# Patient Record
Sex: FEMALE | Race: WHITE | NOT HISPANIC OR LATINO | Employment: FULL TIME | URBAN - METROPOLITAN AREA
[De-identification: names, ages, dates, MRNs, and addresses within clinical notes are randomized per-mention and may not be internally consistent; named-entity substitution may affect disease eponyms.]

---

## 2017-01-02 DIAGNOSIS — E03.9 HYPOTHYROIDISM, UNSPECIFIED TYPE: Primary | ICD-10-CM

## 2017-01-02 RX ORDER — LEVOTHYROXINE SODIUM 125 UG/1
125 TABLET ORAL DAILY
Qty: 90 TABLET | Refills: 2 | Status: SHIPPED | OUTPATIENT
Start: 2017-01-02 | End: 2017-08-23

## 2017-01-02 NOTE — TELEPHONE ENCOUNTER
Office notes from 12/5/16:    4. Hypothyroidism, unspecified type  Stable with her current thyroid levels.    No refills needed today. She will contact the clinic when they are due to be sent to [facility]      Rx refilled per [facility] RN refill protocol.    Julissa Ziegler RN

## 2017-01-16 ENCOUNTER — TRANSFERRED RECORDS (OUTPATIENT)
Dept: HEALTH INFORMATION MANAGEMENT | Facility: CLINIC | Age: 57
End: 2017-01-16

## 2017-02-09 ENCOUNTER — MYC MEDICAL ADVICE (OUTPATIENT)
Dept: FAMILY MEDICINE | Facility: CLINIC | Age: 57
End: 2017-02-09

## 2017-02-24 ENCOUNTER — MYC MEDICAL ADVICE (OUTPATIENT)
Dept: FAMILY MEDICINE | Facility: CLINIC | Age: 57
End: 2017-02-24

## 2017-02-24 DIAGNOSIS — E11.9 TYPE 2 DIABETES MELLITUS WITHOUT COMPLICATION, WITHOUT LONG-TERM CURRENT USE OF INSULIN (H): Primary | ICD-10-CM

## 2017-02-27 ENCOUNTER — MYC MEDICAL ADVICE (OUTPATIENT)
Dept: FAMILY MEDICINE | Facility: CLINIC | Age: 57
End: 2017-02-27

## 2017-02-27 PROBLEM — E11.9 TYPE 2 DIABETES MELLITUS WITHOUT COMPLICATION, WITHOUT LONG-TERM CURRENT USE OF INSULIN (H): Status: ACTIVE | Noted: 2017-02-27

## 2017-04-14 ENCOUNTER — TELEPHONE (OUTPATIENT)
Dept: FAMILY MEDICINE | Facility: CLINIC | Age: 57
End: 2017-04-14

## 2017-04-14 DIAGNOSIS — E11.9 TYPE 2 DIABETES MELLITUS WITHOUT COMPLICATION, WITHOUT LONG-TERM CURRENT USE OF INSULIN (H): ICD-10-CM

## 2017-04-14 RX ORDER — LIRAGLUTIDE 6 MG/ML
1.2 INJECTION SUBCUTANEOUS DAILY
Qty: 6 ML | Refills: 0 | Status: SHIPPED | OUTPATIENT
Start: 2017-04-14 | End: 2017-05-15

## 2017-04-14 NOTE — TELEPHONE ENCOUNTER
liraglutide (VICTOZA) 18 MG/3ML soln 6 mL 0 11/15/2016  No   Sig: Inject 1.2 mg Subcutaneous daily            Last Written Prescription Date: 11/15/16  Last Fill Quantity: 6ml, # refills: 0  Last Office Visit with G, [facility] or [facility] prescribing provider:  12/5/16        BP Readings from Last 3 Encounters:   12/05/16 136/82   05/10/16 132/84   02/03/16 135/88     Lab Results   Component Value Date    MICROL 10 05/10/2016     Lab Results   Component Value Date    UMALCR 15.11 05/10/2016     Creatinine   Date Value Ref Range Status   11/21/2016 0.63 0.52 - 1.04 mg/dL Final   ]  GFR Estimate   Date Value Ref Range Status   11/21/2016 >90  Non  GFR Calc   >60 mL/min/1.7m2 Final   05/10/2016 >90  Non  GFR Calc   >60 mL/min/1.7m2 Final   02/03/2016 86 >60 mL/min/1.7m2 Final     Comment:     Non  GFR Calc     GFR Estimate If Black   Date Value Ref Range Status   11/21/2016 >90   GFR Calc   >60 mL/min/1.7m2 Final   05/10/2016 >90   GFR Calc   >60 mL/min/1.7m2 Final   02/03/2016 >90   GFR Calc   >60 mL/min/1.7m2 Final     Lab Results   Component Value Date    CHOL 229 12/30/2016     Lab Results   Component Value Date    HDL 38 12/30/2016     Lab Results   Component Value Date    LDL  12/30/2016     Cannot estimate LDL when triglyceride exceeds 400 mg/dL     12/30/2016     Lab Results   Component Value Date    TRIG 453 12/30/2016     Lab Results   Component Value Date    CHOLHDLRATIO 4.5 02/27/2015     Lab Results   Component Value Date    AST 21 07/21/2015     Lab Results   Component Value Date    ALT 36 07/21/2015     Lab Results   Component Value Date    A1C 6.8 11/21/2016    A1C 6.6 05/10/2016    A1C 8.2 02/03/2016    A1C 7.9 09/10/2015    A1C 7.7 02/27/2015     Potassium   Date Value Ref Range Status   11/21/2016 4.4 3.4 - 5.3 mmol/L Final     Refilled per [facility] RN Refill Protocol.  Nanette Wise RN

## 2017-05-09 ENCOUNTER — MYC MEDICAL ADVICE (OUTPATIENT)
Dept: FAMILY MEDICINE | Facility: CLINIC | Age: 57
End: 2017-05-09

## 2017-05-09 DIAGNOSIS — E11.9 TYPE 2 DIABETES MELLITUS WITHOUT COMPLICATION, WITHOUT LONG-TERM CURRENT USE OF INSULIN (H): Primary | ICD-10-CM

## 2017-05-09 NOTE — TELEPHONE ENCOUNTER
Need previsit labs-see orders and send back to pool to notify patient./Betty Gonzalez,       Lab apt 5/12/17.

## 2017-05-11 DIAGNOSIS — E11.9 TYPE 2 DIABETES MELLITUS WITHOUT COMPLICATION, WITHOUT LONG-TERM CURRENT USE OF INSULIN (H): ICD-10-CM

## 2017-05-11 DIAGNOSIS — I10 BENIGN ESSENTIAL HYPERTENSION: ICD-10-CM

## 2017-05-11 LAB
ALBUMIN SERPL-MCNC: 3.9 G/DL (ref 3.4–5)
ANION GAP SERPL CALCULATED.3IONS-SCNC: 8 MMOL/L (ref 3–14)
BUN SERPL-MCNC: 13 MG/DL (ref 7–30)
CALCIUM SERPL-MCNC: 9.4 MG/DL (ref 8.5–10.1)
CHLORIDE SERPL-SCNC: 99 MMOL/L (ref 94–109)
CO2 SERPL-SCNC: 30 MMOL/L (ref 20–32)
CREAT SERPL-MCNC: 0.66 MG/DL (ref 0.52–1.04)
CREAT UR-MCNC: 12 MG/DL
GFR SERPL CREATININE-BSD FRML MDRD: ABNORMAL ML/MIN/1.7M2
GLUCOSE SERPL-MCNC: 175 MG/DL (ref 70–99)
HBA1C MFR BLD: 7 % (ref 4.3–6)
MICROALBUMIN UR-MCNC: 6 MG/L
MICROALBUMIN/CREAT UR: 45.85 MG/G CR (ref 0–25)
PHOSPHATE SERPL-MCNC: 3.4 MG/DL (ref 2.5–4.5)
POTASSIUM SERPL-SCNC: 4.1 MMOL/L (ref 3.4–5.3)
SODIUM SERPL-SCNC: 137 MMOL/L (ref 133–144)
VIT B12 SERPL-MCNC: 922 PG/ML (ref 193–986)

## 2017-05-11 PROCEDURE — 83036 HEMOGLOBIN GLYCOSYLATED A1C: CPT | Performed by: PHYSICIAN ASSISTANT

## 2017-05-11 PROCEDURE — 82607 VITAMIN B-12: CPT | Performed by: PHYSICIAN ASSISTANT

## 2017-05-11 PROCEDURE — 82043 UR ALBUMIN QUANTITATIVE: CPT | Performed by: PHYSICIAN ASSISTANT

## 2017-05-11 PROCEDURE — 36415 COLL VENOUS BLD VENIPUNCTURE: CPT | Performed by: PHYSICIAN ASSISTANT

## 2017-05-11 PROCEDURE — 80069 RENAL FUNCTION PANEL: CPT | Performed by: PHYSICIAN ASSISTANT

## 2017-05-11 RX ORDER — LISINOPRIL AND HYDROCHLOROTHIAZIDE 20; 25 MG/1; MG/1
TABLET ORAL
Qty: 90 TABLET | Refills: 0 | Status: SHIPPED | OUTPATIENT
Start: 2017-05-11 | End: 2017-08-23

## 2017-05-11 RX ORDER — METFORMIN HCL 500 MG
TABLET, EXTENDED RELEASE 24 HR ORAL
Qty: 360 TABLET | Refills: 0 | Status: SHIPPED | OUTPATIENT
Start: 2017-05-11 | End: 2017-08-23

## 2017-05-12 ENCOUNTER — MYC MEDICAL ADVICE (OUTPATIENT)
Dept: FAMILY MEDICINE | Facility: CLINIC | Age: 57
End: 2017-05-12

## 2017-05-12 DIAGNOSIS — E11.9 TYPE 2 DIABETES MELLITUS WITHOUT COMPLICATION, WITHOUT LONG-TERM CURRENT USE OF INSULIN (H): Primary | ICD-10-CM

## 2017-05-12 DIAGNOSIS — E11.9 TYPE 2 DIABETES MELLITUS WITHOUT COMPLICATION, WITHOUT LONG-TERM CURRENT USE OF INSULIN (H): ICD-10-CM

## 2017-05-15 ENCOUNTER — MYC MEDICAL ADVICE (OUTPATIENT)
Dept: FAMILY MEDICINE | Facility: CLINIC | Age: 57
End: 2017-05-15

## 2017-05-15 DIAGNOSIS — E11.9 TYPE 2 DIABETES MELLITUS WITHOUT COMPLICATION, WITHOUT LONG-TERM CURRENT USE OF INSULIN (H): Primary | ICD-10-CM

## 2017-05-15 RX ORDER — LIRAGLUTIDE 6 MG/ML
1.2 INJECTION SUBCUTANEOUS DAILY
Qty: 18 ML | Refills: 1 | Status: SHIPPED | OUTPATIENT
Start: 2017-05-15 | End: 2017-08-23

## 2017-05-15 NOTE — TELEPHONE ENCOUNTER
Lab Results   Component Value Date    A1C 7.0 05/11/2017    A1C 6.8 11/21/2016    A1C 6.6 05/10/2016    A1C 8.2 02/03/2016    A1C 7.9 09/10/2015     Per protocol, will route encounter to be cosigned by provider for Verbal Orders.  Rosa Rodriguez RN

## 2017-07-10 ENCOUNTER — MYC MEDICAL ADVICE (OUTPATIENT)
Dept: FAMILY MEDICINE | Facility: CLINIC | Age: 57
End: 2017-07-10

## 2017-07-10 DIAGNOSIS — E11.29 TYPE 2 DIABETES MELLITUS WITH MICROALBUMINURIA, WITHOUT LONG-TERM CURRENT USE OF INSULIN (H): Primary | ICD-10-CM

## 2017-07-10 DIAGNOSIS — R80.9 TYPE 2 DIABETES MELLITUS WITH MICROALBUMINURIA, WITHOUT LONG-TERM CURRENT USE OF INSULIN (H): Primary | ICD-10-CM

## 2017-07-10 NOTE — TELEPHONE ENCOUNTER
Albumin Urine mg/g Cr 45.85 (H) 0 - 25 mg/g Cr Final 05/11/2017  9:41 AM 59       To provider to advise    Mildred Gibbs BSN, RN, CPN

## 2017-07-12 ENCOUNTER — TELEPHONE (OUTPATIENT)
Dept: EDUCATION SERVICES | Facility: CLINIC | Age: 57
End: 2017-07-12

## 2017-07-12 NOTE — TELEPHONE ENCOUNTER
Diabetes Education Scheduling Outreach #1:    Call to patient to schedule. Patient declined to schedule. She stated she did not have her schedule on her. Offered to call her back next week, pt declined and stated she will call us when she has her schedule.        [facility] Fierro  [facility]  Diabetes and Nutrition Scheduling

## 2017-07-12 NOTE — TELEPHONE ENCOUNTER
Just FYI we called Amarilis to schedule and she declined as she did not have her schedule and said she will call on her own.  Onofre Valles RN/LOLYE  [place] Diabetes Educator

## 2017-08-16 DIAGNOSIS — I10 BENIGN ESSENTIAL HYPERTENSION: ICD-10-CM

## 2017-08-16 DIAGNOSIS — E11.9 TYPE 2 DIABETES MELLITUS WITHOUT COMPLICATION, WITHOUT LONG-TERM CURRENT USE OF INSULIN (H): ICD-10-CM

## 2017-08-16 NOTE — TELEPHONE ENCOUNTER
Per results notes 5/11/17 patient was to follow up diabetic educator.  Per telephone encounter 7/12/17, patient declined scheduling an appointment.  Please advise on refill.  Rosa Rodriguez RN

## 2017-08-17 RX ORDER — METFORMIN HCL 500 MG
TABLET, EXTENDED RELEASE 24 HR ORAL
OUTPATIENT
Start: 2017-08-17

## 2017-08-17 RX ORDER — LISINOPRIL AND HYDROCHLOROTHIAZIDE 20; 25 MG/1; MG/1
TABLET ORAL
OUTPATIENT
Start: 2017-08-17

## 2017-08-17 NOTE — TELEPHONE ENCOUNTER
Patient is due for an appointment. She has not been seen since 12/2016.   , please contact Amarilis to schedule an appointment.   She will need an appointment for further refills.   Kristen Kehr PA-C

## 2017-08-23 ENCOUNTER — OFFICE VISIT (OUTPATIENT)
Dept: FAMILY MEDICINE | Facility: CLINIC | Age: 57
End: 2017-08-23
Payer: COMMERCIAL

## 2017-08-23 ENCOUNTER — TELEPHONE (OUTPATIENT)
Dept: EDUCATION SERVICES | Facility: CLINIC | Age: 57
End: 2017-08-23

## 2017-08-23 VITALS
HEART RATE: 87 BPM | OXYGEN SATURATION: 98 % | WEIGHT: 181 LBS | TEMPERATURE: 97.7 F | DIASTOLIC BLOOD PRESSURE: 80 MMHG | BODY MASS INDEX: 29.09 KG/M2 | HEIGHT: 66 IN | SYSTOLIC BLOOD PRESSURE: 132 MMHG

## 2017-08-23 DIAGNOSIS — R80.9 TYPE 2 DIABETES MELLITUS WITH MICROALBUMINURIA, WITHOUT LONG-TERM CURRENT USE OF INSULIN (H): Primary | ICD-10-CM

## 2017-08-23 DIAGNOSIS — E03.9 HYPOTHYROIDISM, UNSPECIFIED TYPE: ICD-10-CM

## 2017-08-23 DIAGNOSIS — Z12.11 SCREEN FOR COLON CANCER: ICD-10-CM

## 2017-08-23 DIAGNOSIS — I25.10 CORONARY ARTERY DISEASE INVOLVING NATIVE CORONARY ARTERY OF NATIVE HEART WITHOUT ANGINA PECTORIS: ICD-10-CM

## 2017-08-23 DIAGNOSIS — I10 BENIGN ESSENTIAL HYPERTENSION: ICD-10-CM

## 2017-08-23 DIAGNOSIS — E11.29 TYPE 2 DIABETES MELLITUS WITH MICROALBUMINURIA, WITHOUT LONG-TERM CURRENT USE OF INSULIN (H): Primary | ICD-10-CM

## 2017-08-23 LAB
ALBUMIN SERPL-MCNC: 4.1 G/DL (ref 3.4–5)
ANION GAP SERPL CALCULATED.3IONS-SCNC: 14 MMOL/L (ref 3–14)
BUN SERPL-MCNC: 13 MG/DL (ref 7–30)
CALCIUM SERPL-MCNC: 9.8 MG/DL (ref 8.5–10.1)
CHLORIDE SERPL-SCNC: 100 MMOL/L (ref 94–109)
CHOLEST SERPL-MCNC: 268 MG/DL
CO2 SERPL-SCNC: 24 MMOL/L (ref 20–32)
CREAT SERPL-MCNC: 0.63 MG/DL (ref 0.52–1.04)
CREAT UR-MCNC: 15 MG/DL
GFR SERPL CREATININE-BSD FRML MDRD: >90 ML/MIN/1.7M2
GLUCOSE SERPL-MCNC: 120 MG/DL (ref 70–99)
HBA1C MFR BLD: 7.3 % (ref 4.3–6)
HDLC SERPL-MCNC: 41 MG/DL
LDLC SERPL CALC-MCNC: 156 MG/DL
MICROALBUMIN UR-MCNC: <5 MG/L
MICROALBUMIN/CREAT UR: NORMAL MG/G CR (ref 0–25)
NONHDLC SERPL-MCNC: 227 MG/DL
PHOSPHATE SERPL-MCNC: 4.2 MG/DL (ref 2.5–4.5)
POTASSIUM SERPL-SCNC: 4.1 MMOL/L (ref 3.4–5.3)
SODIUM SERPL-SCNC: 138 MMOL/L (ref 133–144)
T4 FREE SERPL-MCNC: 1.51 NG/DL (ref 0.76–1.46)
TRIGL SERPL-MCNC: 355 MG/DL
TSH SERPL DL<=0.005 MIU/L-ACNC: 0.1 MU/L (ref 0.4–4)

## 2017-08-23 PROCEDURE — 36415 COLL VENOUS BLD VENIPUNCTURE: CPT | Performed by: PHYSICIAN ASSISTANT

## 2017-08-23 PROCEDURE — 84439 ASSAY OF FREE THYROXINE: CPT | Performed by: PHYSICIAN ASSISTANT

## 2017-08-23 PROCEDURE — 84443 ASSAY THYROID STIM HORMONE: CPT | Performed by: PHYSICIAN ASSISTANT

## 2017-08-23 PROCEDURE — 80061 LIPID PANEL: CPT | Performed by: PHYSICIAN ASSISTANT

## 2017-08-23 PROCEDURE — 83036 HEMOGLOBIN GLYCOSYLATED A1C: CPT | Performed by: PHYSICIAN ASSISTANT

## 2017-08-23 PROCEDURE — 82043 UR ALBUMIN QUANTITATIVE: CPT | Performed by: PHYSICIAN ASSISTANT

## 2017-08-23 PROCEDURE — 80069 RENAL FUNCTION PANEL: CPT | Performed by: PHYSICIAN ASSISTANT

## 2017-08-23 PROCEDURE — 99214 OFFICE O/P EST MOD 30 MIN: CPT | Performed by: PHYSICIAN ASSISTANT

## 2017-08-23 RX ORDER — LISINOPRIL AND HYDROCHLOROTHIAZIDE 20; 25 MG/1; MG/1
TABLET ORAL
Qty: 90 TABLET | Refills: 1 | Status: SHIPPED | OUTPATIENT
Start: 2017-08-23 | End: 2018-02-15

## 2017-08-23 RX ORDER — LEVOTHYROXINE SODIUM 125 UG/1
125 TABLET ORAL DAILY
Qty: 90 TABLET | Refills: 3 | Status: SHIPPED | OUTPATIENT
Start: 2017-08-23 | End: 2017-08-25 | Stop reason: DRUGHIGH

## 2017-08-23 RX ORDER — LIRAGLUTIDE 6 MG/ML
1.2 INJECTION SUBCUTANEOUS DAILY
Qty: 6 ML | Refills: 1 | Status: SHIPPED | OUTPATIENT
Start: 2017-08-23 | End: 2018-01-31

## 2017-08-23 RX ORDER — METFORMIN HCL 500 MG
TABLET, EXTENDED RELEASE 24 HR ORAL
Qty: 360 TABLET | Refills: 1 | Status: SHIPPED | OUTPATIENT
Start: 2017-08-23 | End: 2018-02-15

## 2017-08-23 NOTE — MR AVS SNAPSHOT
After Visit Summary   8/23/2017    Amarilis Vazquez    MRN: [id number]           Patient Information     Date Of Birth          1960        Visit Information        Provider Department      8/23/2017 9:50 AM Kehr, Kristen M, PA-C [facility]        Today's Diagnoses     Benign essential hypertension        Type 2 diabetes mellitus without complication, without long-term current use of insulin (H)        Hypothyroidism, unspecified type        Screen for colon cancer        Need for prophylactic vaccination and inoculation against influenza          Care Instructions    Appointment in 6 months with lab tests before.         Diabetes Education appointment                Follow-ups after your visit        Additional Services     DIABETES EDUCATOR REFERRAL       DIABETES SELF MANAGEMENT TRAINING (DSMT)      Your provider has referred you to Diabetes Education: [facility]: Diabetes Education - All [facility] [phone]   [email or web address]    Type of training and number of hours: Previous Diagnosis: Follow-up DSMT - 2 hours.    Medicare covers: 10 hours of initial DSMT in 12 month period from the time of first visit, plus 2 hours of follow-up DSMT annually, and additional hours as requested for insulin training.    Diabetes Type: Type 2 - On Oral Medication             Diabetes Co-Morbidities: none               A1C Goal:  <8.0       A1C is: Lab Results       Component                Value               Date                       A1C                      7.0                 05/11/2017              If an urgent visit is needed or A1C is above 12, Care Team to call the Diabetes Education Team at [phone] or send an In Basket message to the Diabetes Education Pool (P DIAB ED-PATIENT CARE).    Diabetes Education Topics: Comprehensive Knowledge Assessment and Instruction    Special Educational Needs Requiring Individual DSMT: None        MEDICAL NUTRITION THERAPY (MNT) for Diabetes    Medical Nutrition Therapy with a Registered Dietitian can be provided in coordination with Diabetes Self-Management Training to assist in achieving optimal diabetes management.    MNT Type and Hours: Do not initiate MNT at this time.                       Medicare will cover: 3 hours initial MNT in 12 month period after first visit, plus 2 hours of follow-up MNT annually    Please be aware that coverage of these services is subject to the terms and limitations of your health insurance plan.  Call member services at your health plan to determine Diabetes Self-Management Training benefits and ask which blood glucose monitor brands are covered by your plan.      Please bring the following with you to your appointment:    (1)  List of current medications   (2)  List of Blood Glucose Monitor brands that are covered by your insurance plan  (3)  Blood Glucose Monitor and log book  (4)   Food records for the 3 days prior to your visit    The Certified Diabetes Educator may make diabetes medication adjustments per the CDE Protocol and Collaborative Practice Agreement.                  Who to contact     If you have questions or need follow up information about today's clinic visit or your schedule please contact [facility] directly at [phone].  Normal or non-critical lab and imaging results will be communicated to you by MyChart, letter or phone within 4 business days after the clinic has received the results. If you do not hear from us within 7 days, please contact the clinic through [employer] or phone. If you have a critical or abnormal lab result, we will notify you by phone as soon as possible.  Submit refill requests through [employer] or call your pharmacy and they will forward the refill request to us. Please allow 3 business days for your refill to be completed.          Additional Information About Your Visit        MyChart Information     [employer]  "gives you secure access to your electronic health record. If you see a primary care provider, you can also send messages to your care team and make appointments. If you have questions, please call your primary care clinic.  If you do not have a primary care provider, please call [phone] and they will assist you.        Care EveryWhere ID     This is your Care EveryWhere ID. This could be used by other organizations to access your [facility] medical records  [id number]        Your Vitals Were     Pulse Temperature Height Pulse Oximetry BMI (Body Mass Index)       87 97.7  F (36.5  C) (Oral) 5' 6\" (1.676 m) 98% 29.21 kg/m2        Blood Pressure from Last 3 Encounters:   08/23/17 132/80   12/05/16 136/82   05/10/16 132/84    Weight from Last 3 Encounters:   08/23/17 181 lb (82.1 kg)   12/05/16 180 lb (81.6 kg)   05/10/16 172 lb (78 kg)              We Performed the Following     Albumin Random Urine Quantitative     DIABETES EDUCATOR REFERRAL     Hemoglobin A1c     Lipid panel reflex to direct LDL     Renal panel     TSH with free T4 reflex          Today's Medication Changes          These changes are accurate as of: 8/23/17 10:30 AM.  If you have any questions, ask your nurse or doctor.               These medicines have changed or have updated prescriptions.        Dose/Directions    lisinopril-hydrochlorothiazide 20-25 MG per tablet   Commonly known as:  PRINZIDE/ZESTORETIC   This may have changed:  See the new instructions.   Used for:  Benign essential hypertension   Changed by:  Kehr, Kristen M, PA-C        TAKE 1 TABLET EVERY DAY   Quantity:  90 tablet   Refills:  1       metFORMIN 500 MG 24 hr tablet   Commonly known as:  GLUCOPHAGE-XR   This may have changed:  See the new instructions.   Used for:  Type 2 diabetes mellitus without complication, without long-term current use of insulin (H)   Changed by:  Kehr, Kristen M, PA-C        TAKE 2 TABLETS TWICE A DAY   Quantity:  360 tablet   Refills:  1          "   Where to get your medicines      These medications were sent to [facility] [id number] [facility] - [place], [place] - [address], [facility] [address]     Phone:  [phone]     levothyroxine 125 MCG tablet    lisinopril-hydrochlorothiazide 20-25 MG per tablet    metFORMIN 500 MG 24 hr tablet         These medications were sent to [facility] - [place], [place] - [address] [place] [address] [facility] [facility] [address]     Phone:  [phone]     liraglutide 18 MG/3ML soln                Primary Care Provider Office Phone # Fax #    Kristen M Kehr, PA-C [phone] [address] [facility] [address]        Equal Access to Services     BELLA SUNG : Hadii taiwo aden hadasho Soomaali, waaxda luqadaha, qaybta kaalmada adeegyada, hansa andino . So [facility] [phone].    ATENCIÓN: Si habla español, tiene a stroud disposición servicios gratuitos de asistencia lingüística. [facility] [phone].    We comply with applicable federal civil rights laws and [place] laws. We do not discriminate on the basis of race, color, national origin, age, disability sex, sexual orientation or gender identity.            Thank you!     Thank you for choosing [facility]  for your care. Our goal is always to provide you with excellent care. Hearing back from our patients is one way we can continue to improve our services. Please take a few minutes to complete the written survey that you may receive in the mail after your visit with us. Thank you!             Your Updated Medication List - Protect others around you: Learn how to safely use, store and throw away your medicines at www.disposemymeds.org.          This list is accurate as of: 8/23/17 10:30 AM.  Always use your most recent med list.                   Brand Name Dispense Instructions for use Diagnosis    Alpha Lipoic Acid 200 MG Caps            aspirin 81 MG tablet      Take 1 tablet by mouth daily.        B COMPLEX PO      Super B complex        CO Q 10 PO      Take  by mouth.        COMPLETE MULTIVITAMIN/MINERAL PO      Take  by mouth.        Fish Oil Oil      1,400 mg        flaxseed oil 1000 MG Caps      Take  by mouth.        insulin pen needle 32G X 4 MM     100 each    Use 1 pen needles daily or as directed.    Type 2 diabetes mellitus without complication, without long-term current use of insulin (H)       levothyroxine 125 MCG tablet    SYNTHROID/LEVOTHROID    90 tablet    Take 1 tablet (125 mcg) by mouth daily    Hypothyroidism, unspecified type       liraglutide 18 MG/3ML soln    VICTOZA    6 mL    Inject 1.2 mg Subcutaneous daily    Type 2 diabetes mellitus without complication, without long-term current use of insulin (H)       lisinopril-hydrochlorothiazide 20-25 MG per tablet    PRINZIDE/ZESTORETIC    90 tablet    TAKE 1 TABLET EVERY DAY    Benign essential hypertension       Magnesium 400 MG Tabs           metFORMIN 500 MG 24 hr tablet    GLUCOPHAGE-XR    360 tablet    TAKE 2 TABLETS TWICE A DAY    Type 2 diabetes mellitus without complication, without long-term current use of insulin (H)

## 2017-08-23 NOTE — NURSING NOTE
"Chief Complaint   Patient presents with     Diabetes       Initial /86  Pulse 87  Temp 97.7  F (36.5  C) (Oral)  Ht 5' 6\" (1.676 m)  Wt 181 lb (82.1 kg)  SpO2 98%  BMI 29.21 kg/m2 Estimated body mass index is 29.21 kg/(m^2) as calculated from the following:    Height as of this encounter: 5' 6\" (1.676 m).    Weight as of this encounter: 181 lb (82.1 kg).  Medication Reconciliation: complete    TIA Payne MA    "

## 2017-08-23 NOTE — PROGRESS NOTES
SUBJECTIVE:   Amarilis Vazquez is a 57 year old female who presents to clinic today for the following health issues:  Amarilis has been taking her medications. She is also working hard to make lifestyle changes with diet and exercise. She is walking at least 40 minutes daily on the treadmill. She was unable to connect with Diabetes Education, but was unable to make an appointment with her busy schedule. She stopped trying to get in. She feels good and has no concerns today.   She continues to have some numbness in the right great toe.     Diabetes Follow-up    Patient is checking blood sugars: once daily.  Results are as follows:       am - 130's - 140's this morning    Diabetic concerns: None and other - feet     Symptoms of hypoglycemia (low blood sugar): none     Paresthesias (numbness or burning in feet) or sores: Yes    Date of last diabetic eye exam:       Amount of exercise or physical activity:     Problems taking medications regularly: No    Medication side effects: none  Diet:         Problem list and histories reviewed & adjusted, as indicated.  Additional history: as documented    Patient Active Problem List   Diagnosis     HTN, goal below 140/90     Hypothyroidism     CAD (coronary artery disease)     Obesity     Coronary artery disease involving native coronary artery of native heart without angina pectoris     Mixed hyperlipidemia     Advanced directives, counseling/discussion     Type 2 diabetes mellitus with microalbuminuria, without long-term current use of insulin (H)     Past Surgical History:   Procedure Laterality Date     D & C  1979       Social History   Substance Use Topics     Smoking status: Former Smoker     Packs/day: 1.00     Years: 20.00     Types: Cigarettes     Start date: 1/1/1976     Quit date: 1/1/2005     Smokeless tobacco: Never Used      Comment: quit 8-9 years ago     Alcohol use Yes      Comment: seldom     Family History   Problem Relation Age of Onset     CEREBROVASCULAR  DISEASE Mother      CANCER Mother      lung     C.A.D. Mother      MI age 50?     C.A.D. Paternal Grandfather 50     MI     C.A.D. Maternal Grandmother      DIABETES Maternal Grandmother      Thyroid Disease Maternal Grandmother      C.A.D. Maternal Grandfather      Thyroid Disease Son      Breast Cancer No family hx of      Gynecology No family hx of      No ovarian, endometrial cancer     OSTEOPOROSIS No family hx of      Ovarian Cancer No family hx of      Cancer - colorectal No family hx of          Current Outpatient Prescriptions   Medication Sig Dispense Refill     lisinopril-hydrochlorothiazide (PRINZIDE/ZESTORETIC) 20-25 MG per tablet TAKE 1 TABLET EVERY DAY 90 tablet 1     metFORMIN (GLUCOPHAGE-XR) 500 MG 24 hr tablet TAKE 2 TABLETS TWICE A  tablet 1     levothyroxine (SYNTHROID/LEVOTHROID) 125 MCG tablet Take 1 tablet (125 mcg) by mouth daily 90 tablet 3     liraglutide (VICTOZA) 18 MG/3ML soln Inject 1.2 mg Subcutaneous daily 6 mL 1     insulin pen needle 32G X 4 MM Use 1 pen needles daily or as directed. 100 each 5     B Complex Vitamins (B COMPLEX PO) Super B complex       Magnesium 400 MG TABS        Fish Oil OIL 1,400 mg       Alpha Lipoic Acid 200 MG CAPS        Coenzyme Q10 (CO Q 10 PO) Take  by mouth.       Flaxseed, Linseed, (FLAXSEED OIL) 1000 MG CAPS Take  by mouth.       Multiple Vitamins-Minerals (COMPLETE MULTIVITAMIN/MINERAL PO) Take  by mouth.       aspirin 81 MG tablet Take 1 tablet by mouth daily.       [DISCONTINUED] liraglutide (VICTOZA) 18 MG/3ML soln Inject 1.2 mg Subcutaneous daily 18 mL 1     [DISCONTINUED] metFORMIN (GLUCOPHAGE-XR) 500 MG 24 hr tablet TAKE 2 TABLETS TWICE A  tablet 0     [DISCONTINUED] lisinopril-hydrochlorothiazide (PRINZIDE/ZESTORETIC) 20-25 MG per tablet TAKE 1 TABLET EVERY DAY 90 tablet 0     [DISCONTINUED] levothyroxine (SYNTHROID/LEVOTHROID) 125 MCG tablet Take 1 tablet (125 mcg) by mouth daily 90 tablet 2     No Known Allergies      Reviewed  "and updated as needed this visit by clinical staffAllergies       Reviewed and updated as needed this visit by Provider         ROS:  Constitutional, HEENT, cardiovascular, pulmonary, GI, , musculoskeletal, neuro, skin, endocrine and psych systems are negative, except as otherwise noted.      OBJECTIVE:   /80  Pulse 87  Temp 97.7  F (36.5  C) (Oral)  Ht 5' 6\" (1.676 m)  Wt 181 lb (82.1 kg)  SpO2 98%  BMI 29.21 kg/m2  Body mass index is 29.21 kg/(m^2).  GENERAL: healthy, alert and no distress  RESP: lungs clear to auscultation - no rales, rhonchi or wheezes  CV: regular rate and rhythm, normal S1 S2, no S3 or S4, no murmur, click or rub, no peripheral edema and peripheral pulses strong  MS: no gross musculoskeletal defects noted, no edema  SKIN: no suspicious lesions or rashes  PSYCH: mentation appears normal, affect normal/bright  Diabetic foot exam: normal DP and PT pulses, no trophic changes or ulcerative lesions and reduced sensation with monofilament in the lateral aspect of the right great toe. Otherwise monofilament exam is normal.     Diagnostic Test Results:  pending    ASSESSMENT/PLAN:       1. Type 2 diabetes mellitus with microalbuminuria, without long-term current use of insulin (H)  A1C will be done today.   If above 7, then I recommend that she increase the victoza to 1.8 mg dose.   She refuses to make any changes with her medications at this time since she is making changes with her diet.   I would like her to meet with Diabetes Education. She has contact information for this.   She is taking an ASA  She declines a statin and is well aware of the risks.   - metFORMIN (GLUCOPHAGE-XR) 500 MG 24 hr tablet; TAKE 2 TABLETS TWICE A DAY  Dispense: 360 tablet; Refill: 1  - liraglutide (VICTOZA) 18 MG/3ML soln; Inject 1.2 mg Subcutaneous daily  Dispense: 6 mL; Refill: 1  - DIABETES EDUCATOR REFERRAL    2. Benign essential hypertension  Stable, recheck is normal.   - " lisinopril-hydrochlorothiazide (PRINZIDE/ZESTORETIC) 20-25 MG per tablet; TAKE 1 TABLET EVERY DAY  Dispense: 90 tablet; Refill: 1    3. Hypothyroidism, unspecified type  TSH is done today.   - levothyroxine (SYNTHROID/LEVOTHROID) 125 MCG tablet; Take 1 tablet (125 mcg) by mouth daily  Dispense: 90 tablet; Refill: 3    4. Screen for colon cancer  - Fecal colorectal cancer screen FIT - Future (S+30); Future    5. Coronary artery disease  She still declines adding a statin, reviewed risks once again.     She was scheduled for an appointment in 6 months with fasting lab tests.     Kristen M. Kehr, PA-C  [facility]

## 2017-08-25 ENCOUNTER — TELEPHONE (OUTPATIENT)
Dept: FAMILY MEDICINE | Facility: CLINIC | Age: 57
End: 2017-08-25

## 2017-08-25 DIAGNOSIS — E03.9 HYPOTHYROIDISM, UNSPECIFIED TYPE: Primary | ICD-10-CM

## 2017-08-25 RX ORDER — LEVOTHYROXINE SODIUM 112 UG/1
112 TABLET ORAL DAILY
Qty: 90 TABLET | Refills: 1 | Status: SHIPPED | OUTPATIENT
Start: 2017-08-25 | End: 2018-01-10

## 2017-08-25 NOTE — TELEPHONE ENCOUNTER
Patient notified of results and voiced understanding.     No further questions or concerns at this time.  Emy Gant RN   [facility]

## 2017-08-25 NOTE — TELEPHONE ENCOUNTER
Please contact Amarilis and let her know that her tests are back and there needs to be adjustments with her medication.     She is going to have to decrease the dose of the thyroid medication. She will go to 112 mcg daily.   I will send a new prescription to the [facility] pharmacy    Her thyroid function can be checked again with a lab only appointment in 2 months.     Her cholesterol is high, she refuses a statin, all results will be sent via [employer].     Kristen Kehr PA-C            Results for orders placed or performed in visit on 08/23/17   Albumin Random Urine Quantitative   Result Value Ref Range    Creatinine Urine 15 mg/dL    Albumin Urine mg/L <5 mg/L    Albumin Urine mg/g Cr Unable to calculate due to low value 0 - 25 mg/g Cr   Hemoglobin A1c   Result Value Ref Range    Hemoglobin A1C 7.3 (H) 4.3 - 6.0 %   Lipid panel reflex to direct LDL   Result Value Ref Range    Cholesterol 268 (H) <200 mg/dL    Triglycerides 355 (H) <150 mg/dL    HDL Cholesterol 41 (L) >49 mg/dL    LDL Cholesterol Calculated 156 (H) <100 mg/dL    Non HDL Cholesterol 227 (H) <130 mg/dL   Renal panel   Result Value Ref Range    Sodium 138 133 - 144 mmol/L    Potassium 4.1 3.4 - 5.3 mmol/L    Chloride 100 94 - 109 mmol/L    Carbon Dioxide 24 20 - 32 mmol/L    Anion Gap 14 3 - 14 mmol/L    Glucose 120 (H) 70 - 99 mg/dL    Urea Nitrogen 13 7 - 30 mg/dL    Creatinine 0.63 0.52 - 1.04 mg/dL    GFR Estimate >90 >60 mL/min/1.7m2    GFR Estimate If Black >90 >60 mL/min/1.7m2    Calcium 9.8 8.5 - 10.1 mg/dL    Phosphorus 4.2 2.5 - 4.5 mg/dL    Albumin 4.1 3.4 - 5.0 g/dL   TSH with free T4 reflex   Result Value Ref Range    TSH 0.10 (L) 0.40 - 4.00 mU/L   T4 free   Result Value Ref Range    T4 Free 1.51 (H) 0.76 - 1.46 ng/dL

## 2017-09-24 DIAGNOSIS — I10 BENIGN ESSENTIAL HYPERTENSION: ICD-10-CM

## 2017-09-26 RX ORDER — LISINOPRIL AND HYDROCHLOROTHIAZIDE 20; 25 MG/1; MG/1
TABLET ORAL
Qty: 90 TABLET | Refills: 0 | OUTPATIENT
Start: 2017-09-26

## 2017-10-08 PROCEDURE — 82274 ASSAY TEST FOR BLOOD FECAL: CPT | Performed by: PHYSICIAN ASSISTANT

## 2017-10-12 LAB — HEMOCCULT STL QL IA: NEGATIVE

## 2017-10-30 ENCOUNTER — MYC MEDICAL ADVICE (OUTPATIENT)
Dept: FAMILY MEDICINE | Facility: CLINIC | Age: 57
End: 2017-10-30

## 2017-10-30 DIAGNOSIS — E78.2 MIXED HYPERLIPIDEMIA: ICD-10-CM

## 2017-10-30 DIAGNOSIS — E11.9 TYPE 2 DIABETES MELLITUS WITHOUT COMPLICATION, WITHOUT LONG-TERM CURRENT USE OF INSULIN (H): ICD-10-CM

## 2017-10-30 RX ORDER — ROSUVASTATIN CALCIUM 5 MG/1
5 TABLET, COATED ORAL DAILY
Qty: 90 TABLET | Refills: 2 | Status: SHIPPED | OUTPATIENT
Start: 2017-10-30 | End: 2018-07-26

## 2017-10-30 NOTE — TELEPHONE ENCOUNTER
Result notes 8/23/17  Amarilis,   Your thyroid medication needs to be adjusted, so a new dose was sent to [facility].   A nurse will also be contacting you with the details and follow up.   Your cholesterol is still too high. I would like you to start a statin.   I will wait to hear from you about the statin, since we have discussed this already and know that you are wanting to avoid it.   Kristen Kehr PA-C       Per protocol, will route encounter to be cosigned by provider for Verbal Orders.  Rosa Rodriguez RN

## 2017-11-18 ENCOUNTER — MYC MEDICAL ADVICE (OUTPATIENT)
Dept: FAMILY MEDICINE | Facility: CLINIC | Age: 57
End: 2017-11-18

## 2017-11-18 NOTE — LETTER
[facility]  [address] [facility] [address] [phone]      To Whom it May Concern:        Amarilis Vazquez is treated for Diabetes Type 2. She would benefit from regular exercise and weight loss. Please allow her to use benefits to pay for a gym membership.         Sincerely,       Kristen Kehr PA-C

## 2017-12-27 ENCOUNTER — MYC MEDICAL ADVICE (OUTPATIENT)
Dept: FAMILY MEDICINE | Facility: CLINIC | Age: 57
End: 2017-12-27

## 2017-12-27 NOTE — LETTER
[facility]  [address] [address] [phone]     11/20/2017     To Whom it May Concern:           Amarilis Vazquez is treated for Diabetes Type 2. She would benefit from regular exercise and weight loss. Please allow her to use benefits to pay for a gym membership.            Sincerely,         Kristen Kehr PA-C

## 2017-12-27 NOTE — TELEPHONE ENCOUNTER
New letter started with postdate from 11/20/17.     Routing to provider to review and sign.     Emy Gant RN

## 2017-12-29 NOTE — TELEPHONE ENCOUNTER
Spoke to patient who wanted letter emailed to her. Emailed letter from [id number] to [email or web address]    Orly Kirby,

## 2018-01-08 ENCOUNTER — TRANSFERRED RECORDS (OUTPATIENT)
Dept: HEALTH INFORMATION MANAGEMENT | Facility: CLINIC | Age: 58
End: 2018-01-08

## 2018-01-09 DIAGNOSIS — E11.29 TYPE 2 DIABETES MELLITUS WITH MICROALBUMINURIA, WITHOUT LONG-TERM CURRENT USE OF INSULIN (H): Primary | ICD-10-CM

## 2018-01-09 DIAGNOSIS — R80.9 TYPE 2 DIABETES MELLITUS WITH MICROALBUMINURIA, WITHOUT LONG-TERM CURRENT USE OF INSULIN (H): Primary | ICD-10-CM

## 2018-01-09 DIAGNOSIS — E03.9 HYPOTHYROIDISM, UNSPECIFIED TYPE: ICD-10-CM

## 2018-01-09 LAB
T4 FREE SERPL-MCNC: 1.43 NG/DL (ref 0.76–1.46)
TSH SERPL DL<=0.005 MIU/L-ACNC: 0.12 MU/L (ref 0.4–4)

## 2018-01-09 PROCEDURE — 84439 ASSAY OF FREE THYROXINE: CPT | Performed by: PHYSICIAN ASSISTANT

## 2018-01-09 PROCEDURE — 84443 ASSAY THYROID STIM HORMONE: CPT | Performed by: PHYSICIAN ASSISTANT

## 2018-01-09 PROCEDURE — 36415 COLL VENOUS BLD VENIPUNCTURE: CPT | Performed by: PHYSICIAN ASSISTANT

## 2018-01-10 ENCOUNTER — TELEPHONE (OUTPATIENT)
Dept: FAMILY MEDICINE | Facility: CLINIC | Age: 58
End: 2018-01-10

## 2018-01-10 DIAGNOSIS — E03.9 HYPOTHYROIDISM, UNSPECIFIED TYPE: Primary | ICD-10-CM

## 2018-01-10 RX ORDER — LEVOTHYROXINE SODIUM 100 UG/1
112 TABLET ORAL DAILY
Qty: 90 TABLET | Refills: 1 | Status: SHIPPED | OUTPATIENT
Start: 2018-01-10 | End: 2018-06-21

## 2018-01-10 NOTE — TELEPHONE ENCOUNTER
Patient informed of provider note as below and of prescription sent to pharmacy.  Patient verbalized understanding.  .Mildred SALN, RN, CPN

## 2018-01-10 NOTE — TELEPHONE ENCOUNTER
Please contact Amarilis and let her know that her thyroid test still shows that her medication needs to be adjusted.   I am going to send a new prescription for 100 mcg to the pharmacy. Recheck when she needs to be seen for diabetes in Feb or March it can wait for that appointment and lab tests.  Kristen Kehr PA-C

## 2018-01-31 DIAGNOSIS — R80.9 TYPE 2 DIABETES MELLITUS WITH MICROALBUMINURIA, WITHOUT LONG-TERM CURRENT USE OF INSULIN (H): ICD-10-CM

## 2018-01-31 DIAGNOSIS — E11.29 TYPE 2 DIABETES MELLITUS WITH MICROALBUMINURIA, WITHOUT LONG-TERM CURRENT USE OF INSULIN (H): ICD-10-CM

## 2018-02-01 RX ORDER — LIRAGLUTIDE 6 MG/ML
1.2 INJECTION SUBCUTANEOUS DAILY
Qty: 6 ML | Refills: 0 | Status: SHIPPED | OUTPATIENT
Start: 2018-02-01 | End: 2018-02-26

## 2018-02-15 DIAGNOSIS — E11.29 TYPE 2 DIABETES MELLITUS WITH MICROALBUMINURIA, WITHOUT LONG-TERM CURRENT USE OF INSULIN (H): ICD-10-CM

## 2018-02-15 DIAGNOSIS — R80.9 TYPE 2 DIABETES MELLITUS WITH MICROALBUMINURIA, WITHOUT LONG-TERM CURRENT USE OF INSULIN (H): ICD-10-CM

## 2018-02-15 DIAGNOSIS — I10 BENIGN ESSENTIAL HYPERTENSION: ICD-10-CM

## 2018-02-15 RX ORDER — METFORMIN HCL 500 MG
TABLET, EXTENDED RELEASE 24 HR ORAL
Qty: 360 TABLET | Refills: 0 | Status: SHIPPED | OUTPATIENT
Start: 2018-02-15 | End: 2018-02-26

## 2018-02-15 RX ORDER — LISINOPRIL AND HYDROCHLOROTHIAZIDE 20; 25 MG/1; MG/1
TABLET ORAL
Qty: 90 TABLET | Refills: 0 | Status: SHIPPED | OUTPATIENT
Start: 2018-02-15 | End: 2018-02-26

## 2018-02-26 ENCOUNTER — OFFICE VISIT (OUTPATIENT)
Dept: FAMILY MEDICINE | Facility: CLINIC | Age: 58
End: 2018-02-26
Payer: COMMERCIAL

## 2018-02-26 VITALS
RESPIRATION RATE: 16 BRPM | BODY MASS INDEX: 29.09 KG/M2 | DIASTOLIC BLOOD PRESSURE: 80 MMHG | WEIGHT: 181 LBS | HEART RATE: 89 BPM | HEIGHT: 66 IN | TEMPERATURE: 97.2 F | OXYGEN SATURATION: 100 % | SYSTOLIC BLOOD PRESSURE: 132 MMHG

## 2018-02-26 DIAGNOSIS — R80.9 TYPE 2 DIABETES MELLITUS WITH MICROALBUMINURIA, WITHOUT LONG-TERM CURRENT USE OF INSULIN (H): Primary | ICD-10-CM

## 2018-02-26 DIAGNOSIS — Z12.31 VISIT FOR SCREENING MAMMOGRAM: ICD-10-CM

## 2018-02-26 DIAGNOSIS — I10 BENIGN ESSENTIAL HYPERTENSION: ICD-10-CM

## 2018-02-26 DIAGNOSIS — E03.9 HYPOTHYROIDISM, UNSPECIFIED TYPE: ICD-10-CM

## 2018-02-26 DIAGNOSIS — I25.10 CORONARY ARTERY DISEASE INVOLVING NATIVE CORONARY ARTERY OF NATIVE HEART WITHOUT ANGINA PECTORIS: ICD-10-CM

## 2018-02-26 DIAGNOSIS — E53.8 VITAMIN B12 DEFICIENCY (NON ANEMIC): ICD-10-CM

## 2018-02-26 DIAGNOSIS — E11.29 TYPE 2 DIABETES MELLITUS WITH MICROALBUMINURIA, WITHOUT LONG-TERM CURRENT USE OF INSULIN (H): Primary | ICD-10-CM

## 2018-02-26 LAB
ALBUMIN SERPL-MCNC: 4.1 G/DL (ref 3.4–5)
ALP SERPL-CCNC: 69 U/L (ref 40–150)
ALT SERPL W P-5'-P-CCNC: 46 U/L (ref 0–50)
ANION GAP SERPL CALCULATED.3IONS-SCNC: 7 MMOL/L (ref 3–14)
AST SERPL W P-5'-P-CCNC: 23 U/L (ref 0–45)
BILIRUB SERPL-MCNC: 1 MG/DL (ref 0.2–1.3)
BUN SERPL-MCNC: 14 MG/DL (ref 7–30)
CALCIUM SERPL-MCNC: 9.2 MG/DL (ref 8.5–10.1)
CHLORIDE SERPL-SCNC: 98 MMOL/L (ref 94–109)
CHOLEST SERPL-MCNC: 173 MG/DL
CO2 SERPL-SCNC: 29 MMOL/L (ref 20–32)
CREAT SERPL-MCNC: 0.61 MG/DL (ref 0.52–1.04)
GFR SERPL CREATININE-BSD FRML MDRD: >90 ML/MIN/1.7M2
GLUCOSE SERPL-MCNC: 138 MG/DL (ref 70–99)
HBA1C MFR BLD: 7.9 % (ref 4.3–6)
HDLC SERPL-MCNC: 41 MG/DL
LDLC SERPL CALC-MCNC: 59 MG/DL
NONHDLC SERPL-MCNC: 132 MG/DL
POTASSIUM SERPL-SCNC: 4.3 MMOL/L (ref 3.4–5.3)
PROT SERPL-MCNC: 7.9 G/DL (ref 6.8–8.8)
SODIUM SERPL-SCNC: 134 MMOL/L (ref 133–144)
TRIGL SERPL-MCNC: 363 MG/DL
TSH SERPL DL<=0.005 MIU/L-ACNC: 0.49 MU/L (ref 0.4–4)
VIT B12 SERPL-MCNC: 1951 PG/ML (ref 193–986)

## 2018-02-26 PROCEDURE — 99207 C FOOT EXAM  NO CHARGE: CPT | Performed by: PHYSICIAN ASSISTANT

## 2018-02-26 PROCEDURE — 83036 HEMOGLOBIN GLYCOSYLATED A1C: CPT | Performed by: PHYSICIAN ASSISTANT

## 2018-02-26 PROCEDURE — 80061 LIPID PANEL: CPT | Performed by: PHYSICIAN ASSISTANT

## 2018-02-26 PROCEDURE — 80053 COMPREHEN METABOLIC PANEL: CPT | Performed by: PHYSICIAN ASSISTANT

## 2018-02-26 PROCEDURE — 36415 COLL VENOUS BLD VENIPUNCTURE: CPT | Performed by: PHYSICIAN ASSISTANT

## 2018-02-26 PROCEDURE — 99214 OFFICE O/P EST MOD 30 MIN: CPT | Performed by: PHYSICIAN ASSISTANT

## 2018-02-26 PROCEDURE — 84443 ASSAY THYROID STIM HORMONE: CPT | Performed by: PHYSICIAN ASSISTANT

## 2018-02-26 PROCEDURE — 82607 VITAMIN B-12: CPT | Performed by: PHYSICIAN ASSISTANT

## 2018-02-26 RX ORDER — MAGNESIUM 200 MG
1000 TABLET ORAL DAILY
COMMUNITY

## 2018-02-26 ASSESSMENT — PAIN SCALES - GENERAL: PAINLEVEL: NO PAIN (0)

## 2018-02-26 NOTE — NURSING NOTE
"Chief Complaint   Patient presents with     Diabetes       Initial /80  Pulse 89  Temp 97.2  F (36.2  C) (Oral)  Resp 16  Ht 5' 5.5\" (1.664 m)  Wt 181 lb (82.1 kg)  SpO2 100%  BMI 29.66 kg/m2 Estimated body mass index is 29.66 kg/(m^2) as calculated from the following:    Height as of this encounter: 5' 5.5\" (1.664 m).    Weight as of this encounter: 181 lb (82.1 kg).  Medication Reconciliation: complete    TIA Payne MA    "

## 2018-02-26 NOTE — PATIENT INSTRUCTIONS
Appointment in 6 months for diabetes    Appointment for routine exam to update Pap screening and health maintenance within the next month

## 2018-02-26 NOTE — PROGRESS NOTES
SUBJECTIVE:   Amarilis Vazquez is a 58 year old female who presents to clinic today for the following health issues:    Jyoti is here today for her 6 month follow up for diabetes. She has been taking her medications, but admits she has not been following a particular diet plan. She has a hard time in the winter with exercise and trying to get back in a routine. Her sugars at home are elevated.     Diabetes Follow-up    Patient is checking blood sugars: once daily.  Results are as follows:         am - not good    Diabetic concerns: other -possible hot flashes     Symptoms of hypoglycemia (low blood sugar): none     Paresthesias (numbness or burning in feet) or sores: Yes-numbness     Date of last diabetic eye exam:     BP Readings from Last 2 Encounters:   08/23/17 132/80   12/05/16 136/82     Hemoglobin A1C (%)   Date Value   08/23/2017 7.3 (H)   05/11/2017 7.0 (H)     LDL Cholesterol Calculated (mg/dL)   Date Value   08/23/2017 156 (H)   12/30/2016     Cannot estimate LDL when triglyceride exceeds 400 mg/dL     LDL Cholesterol Direct (mg/dL)   Date Value   12/30/2016 130 (H)       Amount of exercise or physical activity:     Problems taking medications regularly: No    Medication side effects: none    Diet:         PROBLEMS TO ADD ON...  Hyperlipidemia Follow-Up      Rate your low fat/cholesterol diet?: not monitoring fat    Taking statin?  Yes, no muscle aches from statin    Other lipid medications/supplements?:  none    Hypertension Follow-up      Outpatient blood pressures are not being checked.    Low Salt Diet: no added salt    Vascular Disease Follow-up:  Coronary Artery Disease (CAD)      Chest pain or pressure, left side neck or arm pain: No    Shortness of breath/increased sweats/nausea with exertion: No    Pain in calves walking 1-2 blocks: No    Worsened or new symptoms since last visit: No    Nitroglycerin use: no    Daily aspirin use: Yes    Hypothyroidism Follow-up      Since last visit, patient  describes the following symptoms: Weight stable, no hair loss, no skin changes, no constipation, no loose stools    Medication was adjusted last appointment, she is due for testing      Problem list and histories reviewed & adjusted, as indicated.  Additional history: as documented    Patient Active Problem List   Diagnosis     HTN, goal below 140/90     Hypothyroidism     CAD (coronary artery disease)     Obesity     Coronary artery disease involving native coronary artery of native heart without angina pectoris     Mixed hyperlipidemia     Advanced directives, counseling/discussion     Type 2 diabetes mellitus with microalbuminuria, without long-term current use of insulin (H)     Past Surgical History:   Procedure Laterality Date     D & C  1979       Social History   Substance Use Topics     Smoking status: Former Smoker     Packs/day: 1.00     Years: 20.00     Types: Cigarettes     Start date: 1/1/1976     Quit date: 1/1/2005     Smokeless tobacco: Never Used      Comment: quit 8-9 years ago     Alcohol use Yes      Comment: seldom     Family History   Problem Relation Age of Onset     CEREBROVASCULAR DISEASE Mother      CANCER Mother      lung     C.A.D. Mother      MI age 50?     C.A.D. Paternal Grandfather 50     MI     C.A.D. Maternal Grandmother      DIABETES Maternal Grandmother      Thyroid Disease Maternal Grandmother      C.A.D. Maternal Grandfather      Thyroid Disease Son      Breast Cancer No family hx of      Gynecology No family hx of      No ovarian, endometrial cancer     OSTEOPOROSIS No family hx of      Ovarian Cancer No family hx of      Cancer - colorectal No family hx of          Current Outpatient Prescriptions   Medication Sig Dispense Refill     cyanocobalamin 1000 MCG SUBL sublingual tablet Place 1,000 mcg under the tongue daily       lisinopril-hydrochlorothiazide (PRINZIDE/ZESTORETIC) 20-25 MG per tablet TAKE 1 TABLET BY MOUTH EVERY DAY 90 tablet 0     metFORMIN (GLUCOPHAGE-XR) 500 MG  "24 hr tablet TAKE 2 TABLETS BY MOUTH TWICE A  tablet 0     liraglutide (VICTOZA) 18 MG/3ML soln Inject 1.2 mg Subcutaneous daily 6 mL 0     levothyroxine (SYNTHROID/LEVOTHROID) 100 MCG tablet Take 1 tablet (100 mcg) by mouth daily 90 tablet 1     rosuvastatin (CRESTOR) 5 MG tablet Take 1 tablet (5 mg) by mouth daily 90 tablet 2     insulin pen needle 32G X 4 MM Use 1 pen needles daily or as directed. 100 each 5     B Complex Vitamins (B COMPLEX PO) Super B complex       Magnesium 400 MG TABS        Fish Oil OIL 1,400 mg       Alpha Lipoic Acid 200 MG CAPS        Coenzyme Q10 (CO Q 10 PO) Take  by mouth.       Flaxseed, Linseed, (FLAXSEED OIL) 1000 MG CAPS Take  by mouth.       Multiple Vitamins-Minerals (COMPLETE MULTIVITAMIN/MINERAL PO) Take  by mouth.       aspirin 81 MG tablet Take 1 tablet by mouth daily.       No Known Allergies  BP Readings from Last 3 Encounters:   02/26/18 132/80   08/23/17 132/80   12/05/16 136/82    Wt Readings from Last 3 Encounters:   02/26/18 181 lb (82.1 kg)   08/23/17 181 lb (82.1 kg)   12/05/16 180 lb (81.6 kg)                    Reviewed and updated as needed this visit by clinical staff       Reviewed and updated as needed this visit by Provider         ROS:  Constitutional, HEENT, cardiovascular, pulmonary, GI, , musculoskeletal, neuro, skin, endocrine and psych systems are negative, except as otherwise noted.    OBJECTIVE:     /80  Pulse 89  Temp 97.2  F (36.2  C) (Oral)  Resp 16  Ht 5' 5.5\" (1.664 m)  Wt 181 lb (82.1 kg)  SpO2 100%  BMI 29.66 kg/m2  Body mass index is 29.66 kg/(m^2).  GENERAL: healthy, alert and no distress  EYES: Eyes grossly normal to inspection, PERRL and conjunctivae and sclerae normal  HENT: ear canals and TM's normal, nose and mouth without ulcers or lesions  NECK: no adenopathy, no asymmetry, masses, or scars and thyroid normal to palpation  RESP: lungs clear to auscultation - no rales, rhonchi or wheezes  CV: regular rate and " rhythm, normal S1 S2, no S3 or S4, no murmur, click or rub, no peripheral edema and peripheral pulses strong  MS: no gross musculoskeletal defects noted, no edema  SKIN: no suspicious lesions or rashes  NEURO: Normal strength and tone, mentation intact and speech normal  PSYCH: mentation appears normal, affect normal/bright  Diabetic foot exam: normal DP and PT pulses, no trophic changes or ulcerative lesions, normal sensory exam and normal monofilament exam    Diagnostic Test Results:  A1C: 7.9    ASSESSMENT/PLAN:         1. Type 2 diabetes mellitus with microalbuminuria, without long-term current use of insulin (H)  A1C is available, all other tests are still pending.   I will send via [employer] ([place] when all results are back.   She will continue on her same dose of medication.   Encouraged to work with the Diabetes Educator and also on making lifestyle changes. She admits she has not been doing well with this. If A1C is above 8.0 at her next appointment, she will need to discuss other options for medications or adding insulin with Endocrine.   - Lipid panel reflex to direct LDL Fasting  - Comprehensive metabolic panel  - Hemoglobin A1c  - FOOT EXAM  - liraglutide (VICTOZA) 18 MG/3ML soln; Inject 1.2 mg Subcutaneous daily  Dispense: 18 mL; Refill: 1  - metFORMIN (GLUCOPHAGE-XR) 500 MG 24 hr tablet; TAKE 2 TABLETS BY MOUTH TWICE A DAY  Dispense: 360 tablet; Refill: 1  - lisinopril-hydrochlorothiazide (PRINZIDE/ZESTORETIC) 20-25 MG per tablet; Take 1 tablet by mouth daily  Dispense: 90 tablet; Refill: 1    2. Coronary artery disease involving native coronary artery of native heart without angina pectoris  Stable, see above.     3. Hypothyroidism, unspecified type  Stable, waiting for TSH  - TSH with free T4 reflex    4. Vitamin B12 deficiency (non anemic)  - Vitamin B12    5. Benign essential hypertension  - lisinopril-hydrochlorothiazide (PRINZIDE/ZESTORETIC) 20-25 MG per tablet; Take 1 tablet by mouth daily  Dispense: 90  tablet; Refill: 1    6. Visit for screening mammogram  - *MA Screening Digital Bilateral; Future    Appointment in 6 months, sooner if concerns. She is due for a routine exam and will schedule that appointment in the near future.     Kristen M. Kehr, PA-C  [facility]

## 2018-02-26 NOTE — MR AVS SNAPSHOT
After Visit Summary   2/26/2018    Amarilis Vazquez    MRN: [id number]           Patient Information     Date Of Birth          1960        Visit Information        Provider Department      2/26/2018 9:00 AM Kehr, Kristen M, PA-C [facility]        Today's Diagnoses     Type 2 diabetes mellitus with microalbuminuria, without long-term current use of insulin (H)    -  1    Visit for screening mammogram        Hypothyroidism, unspecified type        Vitamin B12 deficiency (non anemic)          Care Instructions    Appointment in 6 months for diabetes    Appointment for routine exam to update Pap screening and health maintenance within the next month           Follow-ups after your visit        Future tests that were ordered for you today     Open Future Orders        Priority Expected Expires Ordered    *MA Screening Digital Bilateral Routine  2/26/2019 2/26/2018            Who to contact     If you have questions or need follow up information about today's clinic visit or your schedule please contact [facility] directly at [phone].  Normal or non-critical lab and imaging results will be communicated to you by [employer], letter or phone within 4 business days after the clinic has received the results. If you do not hear from us within 7 days, please contact the clinic through [employer] or phone. If you have a critical or abnormal lab result, we will notify you by phone as soon as possible.  Submit refill requests through [employer] or call your pharmacy and they will forward the refill request to us. Please allow 3 business days for your refill to be completed.          Additional Information About Your Visit        MyChart Information     [employer] gives you secure access to your electronic health record. If you see a primary care provider, you can also send messages to your care team and make appointments. If you have questions, please call your primary care clinic.  If you do  "not have a primary care provider, please call [phone] and they will assist you.        Care EveryWhere ID     This is your Care EveryWhere ID. This could be used by other organizations to access your [place] medical records  [id number]        Your Vitals Were     Pulse Temperature Height Pulse Oximetry BMI (Body Mass Index)       89 97.2  F (36.2  C) (Oral) 5' 5.5\" (1.664 m) 100% 29.66 kg/m2        Blood Pressure from Last 3 Encounters:   02/26/18 132/80   08/23/17 132/80   12/05/16 136/82    Weight from Last 3 Encounters:   02/26/18 181 lb (82.1 kg)   08/23/17 181 lb (82.1 kg)   12/05/16 180 lb (81.6 kg)              We Performed the Following     Comprehensive metabolic panel     Hemoglobin A1c     Lipid panel reflex to direct LDL Fasting     TSH with free T4 reflex     Vitamin B12        Primary Care Provider Office Phone # Fax #    Kristen M Kehr, PA-C [phone] [address] [facility] [address]        Equal Access to Services     [facility] : Hadii aad ku hadasho Sojoe, waaxda luqadaha, qaybta kaalmada madi, hansa andino . So [facility] [phone].    ATENCIÓN: Si habla español, tiene a stroud disposición servicios gratuitos de asistencia lingüística. [facility] [phone].    We comply with applicable federal civil rights laws and [place] laws. We do not discriminate on the basis of race, color, national origin, age, disability, sex, sexual orientation, or gender identity.            Thank you!     Thank you for choosing [facility]  for your care. Our goal is always to provide you with excellent care. Hearing back from our patients is one way we can continue to improve our services. Please take a few minutes to complete the written survey that you may receive in the mail after your visit with us. Thank you!             Your Updated Medication List - Protect others around you: Learn how to safely use, store and throw away your " medicines at www.disposemymeds.org.          This list is accurate as of 2/26/18  9:37 AM.  Always use your most recent med list.                   Brand Name Dispense Instructions for use Diagnosis    Alpha Lipoic Acid 200 MG Caps           aspirin 81 MG tablet      Take 1 tablet by mouth daily.        B COMPLEX PO      Super B complex        CO Q 10 PO      Take  by mouth.        COMPLETE MULTIVITAMIN/MINERAL PO      Take  by mouth.        cyanocobalamin 1000 MCG Subl sublingual tablet      Place 1,000 mcg under the tongue daily        Fish Oil Oil      1,400 mg        flaxseed oil 1000 MG Caps      Take  by mouth.        insulin pen needle 32G X 4 MM     100 each    Use 1 pen needles daily or as directed.    Type 2 diabetes mellitus without complication, without long-term current use of insulin (H)       levothyroxine 100 MCG tablet    SYNTHROID/LEVOTHROID    90 tablet    Take 1 tablet (100 mcg) by mouth daily    Hypothyroidism, unspecified type       liraglutide 18 MG/3ML soln    VICTOZA    6 mL    Inject 1.2 mg Subcutaneous daily    Type 2 diabetes mellitus with microalbuminuria, without long-term current use of insulin (H)       lisinopril-hydrochlorothiazide 20-25 MG per tablet    PRINZIDE/ZESTORETIC    90 tablet    TAKE 1 TABLET BY MOUTH EVERY DAY    Benign essential hypertension, Type 2 diabetes mellitus with microalbuminuria, without long-term current use of insulin (H)       Magnesium 400 MG Tabs           metFORMIN 500 MG 24 hr tablet    GLUCOPHAGE-XR    360 tablet    TAKE 2 TABLETS BY MOUTH TWICE A DAY    Type 2 diabetes mellitus with microalbuminuria, without long-term current use of insulin (H)       rosuvastatin 5 MG tablet    CRESTOR    90 tablet    Take 1 tablet (5 mg) by mouth daily    Mixed hyperlipidemia, Type 2 diabetes mellitus without complication, without long-term current use of insulin (H)

## 2018-02-27 ENCOUNTER — MYC MEDICAL ADVICE (OUTPATIENT)
Dept: FAMILY MEDICINE | Facility: CLINIC | Age: 58
End: 2018-02-27

## 2018-03-02 ENCOUNTER — TELEPHONE (OUTPATIENT)
Dept: FAMILY MEDICINE | Facility: CLINIC | Age: 58
End: 2018-03-02

## 2018-03-02 RX ORDER — LIRAGLUTIDE 6 MG/ML
1.2 INJECTION SUBCUTANEOUS DAILY
Qty: 18 ML | Refills: 1 | Status: SHIPPED | OUTPATIENT
Start: 2018-03-02 | End: 2018-09-05

## 2018-03-02 RX ORDER — LISINOPRIL AND HYDROCHLOROTHIAZIDE 20; 25 MG/1; MG/1
1 TABLET ORAL DAILY
Qty: 90 TABLET | Refills: 1 | Status: SHIPPED | OUTPATIENT
Start: 2018-03-02 | End: 2018-09-05

## 2018-03-02 RX ORDER — METFORMIN HCL 500 MG
TABLET, EXTENDED RELEASE 24 HR ORAL
Qty: 360 TABLET | Refills: 1 | Status: SHIPPED | OUTPATIENT
Start: 2018-03-02 | End: 2018-09-05

## 2018-03-02 NOTE — TELEPHONE ENCOUNTER
Patient has diabetic check 8/20, wondering if will need to come in fasting. Advised patient she will need to be fasting for appointment. Did offer lab appointment 1 week prior to appointment so Kristen Kehr,PA-C has labs at visit and patient refused.  Patient wanting earlier time since needs to be fasting.  Appointment 8/21 at 8am with Kristen Kehr,PA-C Melissa Krizan BSN, RN, CPN

## 2018-03-02 NOTE — TELEPHONE ENCOUNTER
Patient is wondering if patient need a fasting appointment for visit.   Please call to advise  Thank you

## 2018-03-03 ENCOUNTER — MYC MEDICAL ADVICE (OUTPATIENT)
Dept: FAMILY MEDICINE | Facility: CLINIC | Age: 58
End: 2018-03-03

## 2018-03-03 DIAGNOSIS — G62.9 NEUROPATHY: Primary | ICD-10-CM

## 2018-03-03 DIAGNOSIS — R80.9 TYPE 2 DIABETES MELLITUS WITH MICROALBUMINURIA, WITHOUT LONG-TERM CURRENT USE OF INSULIN (H): ICD-10-CM

## 2018-03-03 DIAGNOSIS — E11.29 TYPE 2 DIABETES MELLITUS WITH MICROALBUMINURIA, WITHOUT LONG-TERM CURRENT USE OF INSULIN (H): ICD-10-CM

## 2018-03-04 ENCOUNTER — HEALTH MAINTENANCE LETTER (OUTPATIENT)
Age: 58
End: 2018-03-04

## 2018-03-05 RX ORDER — LIRAGLUTIDE 6 MG/ML
INJECTION SUBCUTANEOUS
Refills: 0 | OUTPATIENT
Start: 2018-03-05

## 2018-03-20 ENCOUNTER — MYC MEDICAL ADVICE (OUTPATIENT)
Dept: FAMILY MEDICINE | Facility: CLINIC | Age: 58
End: 2018-03-20

## 2018-03-20 DIAGNOSIS — H91.90 HEARING LOSS, UNSPECIFIED HEARING LOSS TYPE, UNSPECIFIED LATERALITY: Primary | ICD-10-CM

## 2018-03-20 NOTE — TELEPHONE ENCOUNTER
Have started audiology referral for pcp to complete.  Patient message states needing audiology appointment; feels she may need hearing aids.  11/28/16 audiology appointment note states was recommended that patient return for a hearing aid consultation. Send back to team when completed to contact patient with referral information.  Natalia Mortensen RN

## 2018-03-22 NOTE — TELEPHONE ENCOUNTER
1. 3/20/18 Kristen Kehr C-PA placed an audiology referral - need to notify patient.   2. Diabetes education referral is pending for signature.  3. Does ortho fit patient for shoe insert for neuropathy? If so, ortho referral is pending. Patient does not have neuropathy on her problem list.     To provider to advise. Thank you, Kathrine Stoll, DORONN RN

## 2018-03-30 ENCOUNTER — ALLIED HEALTH/NURSE VISIT (OUTPATIENT)
Dept: EDUCATION SERVICES | Facility: CLINIC | Age: 58
End: 2018-03-30
Payer: COMMERCIAL

## 2018-03-30 VITALS — BODY MASS INDEX: 30.65 KG/M2 | WEIGHT: 187 LBS

## 2018-03-30 DIAGNOSIS — R80.9 TYPE 2 DIABETES MELLITUS WITH MICROALBUMINURIA, WITHOUT LONG-TERM CURRENT USE OF INSULIN (H): Primary | ICD-10-CM

## 2018-03-30 DIAGNOSIS — E11.29 TYPE 2 DIABETES MELLITUS WITH MICROALBUMINURIA, WITHOUT LONG-TERM CURRENT USE OF INSULIN (H): Primary | ICD-10-CM

## 2018-03-30 PROCEDURE — G0108 DIAB MANAGE TRN  PER INDIV: HCPCS

## 2018-03-30 NOTE — MR AVS SNAPSHOT
"              After Visit Summary   3/30/2018    Amarilis Vazquez    MRN: [id number]           Patient Information     Date Of Birth          1960        Visit Information        Provider Department      3/30/2018 9:00 AM [facility] [place]        Care Instructions    Work cooler:    1 bag almonds + 1 container of peanuts (dry or honey roasted) + 2 cups cashews+ 1 container pecans + 1 cup walnuts + 1-2 cup dried fruits (apricots, banana chips, or other dried fruit) + 1/2 cup dark chocolate chips (semi sweet)    Bring portion of [employer] vegetables in cooler    Check out pre portioned cheese flavors    Insurance company: call number on the back  Diabetes Self management Training and/or Medical Nutrition Therapy  \"how many hours for each\"              Follow-ups after your visit        Your next 10 appointments already scheduled     Apr 02, 2018 10:30 AM CDT   New Visit with Timothy Fair   [facility] ([address] [id number] [phone]            Apr 14, 2018 10:15 AM CDT   (Arrive by 10:00 AM)   MA SCREENING DIGITAL BILATERAL with ANDMA1   [facility]    [address] [facility] [facility] [address] [phone]           Do not use any powder, lotion or deodorant under your arms or on your breast. If you do, we will ask you to remove it before your exam.  Wear comfortable, two-piece clothing.  If you have any allergies, tell your care team.  Bring any previous mammograms from other facilities or have them mailed to the breast center.            Apr 23, 2018 10:00 AM CDT   PHYSICAL with Kristen M Kehr, PA-C   [facility] ([facility]    [address] [facility] [address] [phone]            May 04, 2018  9:00 AM CDT   Diabetic Education with [facility]   [facility] [place] ([facility] [place])    [address] [facility]  [place] [address]   [phone]            May 07, 2018  8:30 AM CDT   New Visit with Mati Sousa DPM   [facility]    [address] [facility] [id number] [phone]            Aug 21, 2018  8:00 AM CDT   SHORT with Kristen M Kehr, PA-C   [facility]    [address] [facility] [address] [phone]              Who to contact     If you have questions or need follow up information about today's clinic visit or your schedule please contact [facility] [place] directly at [phone].  Normal or non-critical lab and imaging results will be communicated to you by [employer], letter or phone within 4 business days after the clinic has received the results. If you do not hear from us within 7 days, please contact the clinic through [employer] or phone. If you have a critical or abnormal lab result, we will notify you by phone as soon as possible.  Submit refill requests through [employer] or call your pharmacy and they will forward the refill request to us. Please allow 3 business days for your refill to be completed.          Additional Information About Your Visit        MyChart Information     [employer] gives you secure access to your electronic health record. If you see a primary care provider, you can also send messages to your care team and make appointments. If you have questions, please call your primary care clinic.  If you do not have a primary care provider, please call [phone] and they will assist you.        Care EveryWhere ID     This is your Care EveryWhere ID. This could be used by other organizations to access your [place] medical records  [id number]        Your Vitals Were     BMI (Body Mass Index)                   30.65 kg/m2            Blood Pressure from Last 3 Encounters:   02/26/18 132/80   08/23/17 132/80   12/05/16  136/82    Weight from Last 3 Encounters:   03/30/18 84.8 kg (187 lb)   02/26/18 82.1 kg (181 lb)   08/23/17 82.1 kg (181 lb)              Today, you had the following     No orders found for display       Primary Care Provider Office Phone # Fax #    Kristen M Kehr, PA-C [phone] [address] [facility] [address]        Equal Access to Services     PHILIP SUNG : Hadii aad ku hadasho Soomaali, waaxda luqadaha, qaybta kaalmada adeegyada, waxay idiin hayaan adeeg kharash la'aan . So [facility] [phone].    ATENCIÓN: Si habla español, tiene a stroud disposición servicios gratuitos de asistencia lingüística. [facility] [phone].    We comply with applicable federal civil rights laws and [place] laws. We do not discriminate on the basis of race, color, national origin, age, disability, sex, sexual orientation, or gender identity.            Thank you!     Thank you for choosing [place] [facility]  for your care. Our goal is always to provide you with excellent care. Hearing back from our patients is one way we can continue to improve our services. Please take a few minutes to complete the written survey that you may receive in the mail after your visit with us. Thank you!             Your Updated Medication List - Protect others around you: Learn how to safely use, store and throw away your medicines at www.disposemymeds.org.          This list is accurate as of 3/30/18 10:06 AM.  Always use your most recent med list.                   Brand Name Dispense Instructions for use Diagnosis    Alpha Lipoic Acid 200 MG Caps           aspirin 81 MG tablet      Take 1 tablet by mouth daily.        B COMPLEX PO      Super B complex        CO Q 10 PO      Take  by mouth.        COMPLETE MULTIVITAMIN/MINERAL PO      Take  by mouth.        cyanocobalamin 1000 MCG Subl sublingual tablet      Place 1,000 mcg under the tongue daily        Fish Oil Oil      1,400 mg        flaxseed oil 1000 MG  Caps      Take  by mouth.        insulin pen needle 32G X 4 MM     100 each    Use 1 pen needles daily or as directed.    Type 2 diabetes mellitus without complication, without long-term current use of insulin (H)       levothyroxine 100 MCG tablet    SYNTHROID/LEVOTHROID    90 tablet    Take 1 tablet (100 mcg) by mouth daily    Hypothyroidism, unspecified type       liraglutide 18 MG/3ML soln    VICTOZA    18 mL    Inject 1.2 mg Subcutaneous daily    Type 2 diabetes mellitus with microalbuminuria, without long-term current use of insulin (H)       lisinopril-hydrochlorothiazide 20-25 MG per tablet    PRINZIDE/ZESTORETIC    90 tablet    Take 1 tablet by mouth daily    Benign essential hypertension, Type 2 diabetes mellitus with microalbuminuria, without long-term current use of insulin (H)       Magnesium 400 MG Tabs           metFORMIN 500 MG 24 hr tablet    GLUCOPHAGE-XR    360 tablet    TAKE 2 TABLETS BY MOUTH TWICE A DAY    Type 2 diabetes mellitus with microalbuminuria, without long-term current use of insulin (H)       rosuvastatin 5 MG tablet    CRESTOR    90 tablet    Take 1 tablet (5 mg) by mouth daily    Mixed hyperlipidemia, Type 2 diabetes mellitus without complication, without long-term current use of insulin (H)

## 2018-03-30 NOTE — PATIENT INSTRUCTIONS
"Work cooler:    1 bag almonds + 1 container of peanuts (dry or honey roasted) + 2 cups cashews+ 1 container pecans + 1 cup walnuts + 1-2 cup dried fruits (apricots, banana chips, or other dried fruit) + 1/2 cup dark chocolate chips (semi sweet)    Bring portion of [employer] vegetables in cooler    Check out pre portioned cheese flavors    Insurance company: call number on the back  Diabetes Self management Training and/or Medical Nutrition Therapy  \"how many hours for each\"      "

## 2018-03-30 NOTE — PROGRESS NOTES
Diabetes Self Management Training: Individual Review Visit    Amarilis HIDALGO George presents today for education and evaluation of glucose control related to Type 2 diabetes.    She is accompanied by self    Patient's diabetes management related comments/concerns: I want to eat cookies and not have high BG.  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  I walk 10,000 steps per day, I have a gym membership.  I just have lots of sugar cravings and if I could eat doughnuts every day I would, but I want to avoid insluin if I can.    Patient's emotional response to diabetes: expresses readiness to learn, guilt/self-blame, denial, anger, bargaining and frustration    Patient would like this visit to be focused around the following diabetes-related behaviors and goals: Assistance with making lifestyle changes, Self-care behavioral goal setting, Healthy Eating and Being Active    ASSESSMENT:  Patient Problem List and Family Medical History reviewed for relevant medical history, current medical status, and diabetes risk factors.    Current Diabetes Management per Patient:  Taking diabetes medications?   yes:     Diabetes Medication(s)     Biguanides Sig    metFORMIN (GLUCOPHAGE-XR) 500 MG 24 hr tablet TAKE 2 TABLETS BY MOUTH TWICE A DAY    Incretin Mimetic Agents (GLP-1 Receptor Agonists) Sig    liraglutide (VICTOZA) 18 MG/3ML soln Inject 1.2 mg Subcutaneous daily        Past Diabetes Education: Yes    Patient glucose self monitoring as follows: one time daily in the morning .   BG meter: Reli-On meter  BG results: 214, 157, 180, 152, 154, 203, 167    BG values are: Not in goal  Patient's most recent   Lab Results   Component Value Date    A1C 7.9 02/26/2018    is meeting goal of <8.0    Nutrition:  Patient snacks frequently throughout the day, eats out more than once a week, has an inconsistent intake of carbohydrates and has a high  "intake of carbohydrates from foods containing added sugars.    Breakfast today- 1 pc orange and 1/2 banana               Last vegetable- broccoli or salad, probably had some in the last week but can't remember     Beverages: coffee drink 1/day, 6 water bottles during the day (12 oz)    Cultural/Caodaism diet restrictions: No     Biggest Challenge to Healthy Eating: portion control, eating out, emotional eating and evening snacking    Physical Activity:    Walks 10,000 steps with work then takes care of horses and chickens at home.  Reports having a gym membership but doesn't go because she just wants to be home.    Vitals:  Wt 84.8 kg (187 lb)  BMI 30.65 kg/m2  Estimated body mass index is 29.66 kg/(m^2) as calculated from the following:    Height as of 2/26/18: 1.664 m (5' 5.5\").    Weight as of 2/26/18: 82.1 kg (181 lb).     Wt Readings from Last 10 Encounters:   03/30/18 84.8 kg (187 lb)   02/26/18 82.1 kg (181 lb)   08/23/17 82.1 kg (181 lb)   12/05/16 81.6 kg (180 lb)   05/10/16 78 kg (172 lb)   02/03/16 84.8 kg (187 lb)   12/31/15 84.4 kg (186 lb)   09/22/15 82.7 kg (182 lb 6.4 oz)   07/21/15 83.3 kg (183 lb 9.6 oz)   03/06/15 83.4 kg (183 lb 12.8 oz)     Up 6 lbs in 1 month    Last 3 BP:   BP Readings from Last 3 Encounters:   02/26/18 132/80   08/23/17 132/80   12/05/16 136/82       History   Smoking Status     Former Smoker     Packs/day: 1.00     Years: 20.00     Types: Cigarettes     Start date: 1/1/1976     Quit date: 1/1/2005   Smokeless Tobacco     Never Used     Comment: quit 8-9 years ago       Labs:  Lab Results   Component Value Date    A1C 7.9 02/26/2018     Lab Results   Component Value Date     02/26/2018     Lab Results   Component Value Date    LDL 59 02/26/2018     HDL Cholesterol   Date Value Ref Range Status   02/26/2018 41 (L) >49 mg/dL Final   ]  GFR Estimate   Date Value Ref Range Status   02/26/2018 >90 >60 mL/min/1.7m2 Final     Comment:     Non  GFR Calc "     GFR Estimate If Black   Date Value Ref Range Status   02/26/2018 >90 >60 mL/min/1.7m2 Final     Comment:      GFR Calc     Lab Results   Component Value Date    CR 0.61 02/26/2018     No results found for: MICROALBUMIN    Socio/Economic Considerations:    Support system: spouse/significant other    Health Beliefs and Attitudes:   Patient Activation Measure Survey Score:  DREW Score (Last Two) 8/13/2013 2/6/2014   DREW Raw Score 38 46   Activation Score 52.9 75.3   DREW Level 2 4       Stage of Change: PREPARATION (Decided to change - considering how)      Diabetes knowledge and skills assessment:     Patient is knowledgeable in diabetes management concepts related to: Monitoring and Taking Medication    Patient needs further education on the following diabetes management concepts: Healthy Eating, Problem Solving, and Healthy Coping.    Barriers to Learning Assessment: No Barriers identified    Based on learning assessment above, most appropriate setting for further diabetes education would be: Individual setting.    INTERVENTION:   Discussed patients feelings of frustration toward diabetes and food choices.  Discussed patients use of food and sweets as rewards for stressful days.  Reviewed non food based rewards.  Discussed limited breakfast intake and alternatives to the traditional sit down type breakfast.  Patient was open to trying some nuts mixes and dried fruit as alternative sources of protein for breakfast.  Discussed bringing vegetables, fruits, and small amount of pre-portioned cookies to outings like movies when with clients.  Discussed patients view point on insulin and medications as failure and then associating those feelings on self in relation to disease self management.  Educated patient about natural course of diabetes and factors patient can and cant control.     Education provided today on:  AADE Self-Care Behaviors:  Healthy Eating: consistency in amount, composition, and timing  of food intake, weight reduction, eating out, portion control and grocery shopping.  Being Active: Recommended weight lifting as additional exercise for strength and balance if patient is willing to do additional activity after work.  Taking Medication: Discussed increasing Victoza, but patient reports fullness and would prefer not to increase to 1.8mg.    Healthy Coping: recognize feelings about diagnosis, benefits of making appropriate lifestyle changes and methods for coping with stress    Opportunities for ongoing education and support in diabetes-self management were discussed.    Pt verbalized understanding of concepts discussed and recommendations provided today.       Education Materials Provided:  [place] Understanding Diabetes Booklet, Safe Disposal Options for Needles & Syringes, BG Log Sheet and No new materials provided today    PLAN:  See Patient Instructions for co-developed, patient-stated behavior change goals.    Make trail mix to bring in cooler.  Pack easy portioned vegetables in cooler.  Keep a food log book.    AVS printed and provided to patient today.    FOLLOW-UP:  Follow-up appointment scheduled on 5/4/18.  Education topics to cover at the next diabetes education visit(s): Portion sizes, weight loss tips, continue discussing patient use of food as a reward and stress management.  Chart routed to referring provider.    Ongoing plan for education and support: Written resources (magazines, books, etc.)    Lucy Oliver MS, RD, LD, CDE    Time Spent: 60 minutes  Encounter Type: Individual    Any diabetes medication dose changes were made via the CDE Protocol and Collaborative Practice Agreement with the patient's primary care provider. A copy of this encounter was shared with the provider.

## 2018-04-02 ENCOUNTER — OFFICE VISIT (OUTPATIENT)
Dept: AUDIOLOGY | Facility: CLINIC | Age: 58
End: 2018-04-02
Payer: COMMERCIAL

## 2018-04-02 DIAGNOSIS — H90.3 SENSORINEURAL HEARING LOSS, BILATERAL: Primary | ICD-10-CM

## 2018-04-02 PROCEDURE — 92557 COMPREHENSIVE HEARING TEST: CPT | Performed by: AUDIOLOGIST

## 2018-04-02 PROCEDURE — 92567 TYMPANOMETRY: CPT | Performed by: AUDIOLOGIST

## 2018-04-02 NOTE — PROGRESS NOTES
AUDIOLOGY REPORT    SUBJECTIVE:  Amarilis Vazquez is a 58 year old female who was seen in the Audiology Clinic [facility] on 4/02/18 for audiologic evaluation, referred by Kristen Kehr PA-C.  The patient has been seen previously in this clinic on 11/28/2016 for assessment and results indicated bilateral sensorineural hearing loss. The patient reports a decline in hearing. The patient notes difficulty with communication in a variety of listening situations.     OBJECTIVE:    Otoscopic exam indicates ears are clear of cerumen bilaterally     Pure Tone Thresholds assessed using standard techniques  audiometry with good  reliability from 250-8000 Hz bilaterally using insert earphones     RIGHT:  mild-moderate primarily sensorineural hearing loss    LEFT:    mild-moderate sensorineural hearing loss    Tympanogram:    RIGHT: normal eardrum mobility    LEFT:   normal eardrum mobility    Speech Reception Threshold:    RIGHT: 45 dB HL    LEFT:   45 dB HL    Word Recognition Score:     RIGHT: 96% at 85 dB HL using NU-6 recorded word list.    LEFT:   92% at 85 dB HL using NU-6 recorded word list.      ASSESSMENT:   Bilateral sensorineural hearing loss     Compared to patient's previous audiogram dated 11/28/2016, hearing has declined 5-10 dB at most frequencies tested bilaterally. Patient requested and was provided a copy of her audiogram. Today s results were discussed with the patient in detail.     PLAN:  Patient was counseled regarding hearing loss and impact on communication.  Patient is a good candidate for amplification at this time.  Handout on good communication strategies, and hearing aid use was given to patient. It is recommended that the patient return for a hearing aid consultation.  Please call this clinic with questions regarding these results or recommendations.      Gilbert Tafoya CCC-A  Licensed Audiologist #[id number]  4/2/2018    CC: Kristen Kehr PA-C

## 2018-04-02 NOTE — MR AVS SNAPSHOT
After Visit Summary   4/2/2018    Amarilis Vazquez    MRN: [id number]           Patient Information     Date Of Birth          1960        Visit Information        Provider Department      4/2/2018 10:30 AM Gilbert Lentz AuD [facility]        Today's Diagnoses     Sensorineural hearing loss, bilateral    -  1       Follow-ups after your visit        Your next 10 appointments already scheduled     Apr 14, 2018 10:15 AM CDT   (Arrive by 10:00 AM)   MA SCREENING DIGITAL BILATERAL with ANDMA1   [facility] ([facility])    [address] [facility] [address] [phone]           Do not use any powder, lotion or deodorant under your arms or on your breast. If you do, we will ask you to remove it before your exam.  Wear comfortable, two-piece clothing.  If you have any allergies, tell your care team.  Bring any previous mammograms from other facilities or have them mailed to the breast center.            Apr 23, 2018 10:00 AM CDT   PHYSICAL with Kristen M Kehr, PA-C   [facility] ([facility])    [address] [facility] [address] [phone]            May 04, 2018  9:00 AM CDT   Diabetic Education with [facility]   [facility] ([facility])    [address] [place] [id number] [phone]            May 07, 2018  8:30 AM CDT   New Visit with Mati Sousa DPM   [facility] ([facility])    [address] [address]   [phone]            Aug 21, 2018  8:00 AM CDT   SHORT with Kristen M Kehr, PA-C   [facility] ([facility])    [address] [facility] [address] [phone]              Who to contact     If you have questions or need follow up information about today's clinic visit or your schedule please contact [facility] directly at  [phone].  Normal or non-critical lab and imaging results will be communicated to you by MyChart, letter or phone within 4 business days after the clinic has received the results. If you do not hear from us within 7 days, please contact the clinic through [facility] or phone. If you have a critical or abnormal lab result, we will notify you by phone as soon as possible.  Submit refill requests through [facility] or call your pharmacy and they will forward the refill request to us. Please allow 3 business days for your refill to be completed.          Additional Information About Your Visit        [facility] Information     [facility] gives you secure access to your electronic health record. If you see a primary care provider, you can also send messages to your care team and make appointments. If you have questions, please call your primary care clinic.  If you do not have a primary care provider, please call [phone] and they will assist you.        Care EveryWhere ID     This is your Care EveryWhere ID. This could be used by other organizations to access your [place] medical records  [id number]         Blood Pressure from Last 3 Encounters:   02/26/18 132/80   08/23/17 132/80   12/05/16 136/82    Weight from Last 3 Encounters:   03/30/18 187 lb (84.8 kg)   02/26/18 181 lb (82.1 kg)   08/23/17 181 lb (82.1 kg)              We Performed the Following     AUDIOGRAM/TYMPANOGRAM - INTERFACE     COMPREHENSIVE HEARING TEST     TYMPANOMETRY        Primary Care Provider Office Phone # Fax #    Kristen M Kehr, PA-C [phone] [address] [facility] [address]        Equal Access to Services     [facility]: Hadii aad ku hadasho Soomaali, waaxda luqadaha, qaybta kaalmada adeegyate, hansa rider. So [facility] [phone].    ATENCIÓN: Si habla español, tiene a stroud disposición servicios gratuitos de asistencia lingüística. Llame al [phone].    We comply with applicable  federal civil rights laws and [place] laws. We do not discriminate on the basis of race, color, national origin, age, disability, sex, sexual orientation, or gender identity.            Thank you!     Thank you for choosing [facility]  for your care. Our goal is always to provide you with excellent care. Hearing back from our patients is one way we can continue to improve our services. Please take a few minutes to complete the written survey that you may receive in the mail after your visit with us. Thank you!             Your Updated Medication List - Protect others around you: Learn how to safely use, store and throw away your medicines at www.disposemymeds.org.          This list is accurate as of 4/2/18 11:12 AM.  Always use your most recent med list.                   Brand Name Dispense Instructions for use Diagnosis    Alpha Lipoic Acid 200 MG Caps           aspirin 81 MG tablet      Take 1 tablet by mouth daily.        B COMPLEX PO      Super B complex        CO Q 10 PO      Take  by mouth.        COMPLETE MULTIVITAMIN/MINERAL PO      Take  by mouth.        cyanocobalamin 1000 MCG Subl sublingual tablet      Place 1,000 mcg under the tongue daily        Fish Oil Oil      1,400 mg        flaxseed oil 1000 MG Caps      Take  by mouth.        insulin pen needle 32G X 4 MM     100 each    Use 1 pen needles daily or as directed.    Type 2 diabetes mellitus without complication, without long-term current use of insulin (H)       levothyroxine 100 MCG tablet    SYNTHROID/LEVOTHROID    90 tablet    Take 1 tablet (100 mcg) by mouth daily    Hypothyroidism, unspecified type       liraglutide 18 MG/3ML soln    VICTOZA    18 mL    Inject 1.2 mg Subcutaneous daily    Type 2 diabetes mellitus with microalbuminuria, without long-term current use of insulin (H)       lisinopril-hydrochlorothiazide 20-25 MG per tablet    PRINZIDE/ZESTORETIC    90 tablet    Take 1 tablet by mouth daily    Benign essential  hypertension, Type 2 diabetes mellitus with microalbuminuria, without long-term current use of insulin (H)       Magnesium 400 MG Tabs           metFORMIN 500 MG 24 hr tablet    GLUCOPHAGE-XR    360 tablet    TAKE 2 TABLETS BY MOUTH TWICE A DAY    Type 2 diabetes mellitus with microalbuminuria, without long-term current use of insulin (H)       rosuvastatin 5 MG tablet    CRESTOR    90 tablet    Take 1 tablet (5 mg) by mouth daily    Mixed hyperlipidemia, Type 2 diabetes mellitus without complication, without long-term current use of insulin (H)

## 2018-04-23 ENCOUNTER — OFFICE VISIT (OUTPATIENT)
Dept: FAMILY MEDICINE | Facility: CLINIC | Age: 58
End: 2018-04-23
Payer: COMMERCIAL

## 2018-04-23 VITALS
HEART RATE: 89 BPM | SYSTOLIC BLOOD PRESSURE: 136 MMHG | WEIGHT: 188 LBS | DIASTOLIC BLOOD PRESSURE: 84 MMHG | BODY MASS INDEX: 30.81 KG/M2 | OXYGEN SATURATION: 98 % | RESPIRATION RATE: 20 BRPM | TEMPERATURE: 98.1 F

## 2018-04-23 DIAGNOSIS — Z12.31 VISIT FOR SCREENING MAMMOGRAM: ICD-10-CM

## 2018-04-23 DIAGNOSIS — Z00.00 ROUTINE GENERAL MEDICAL EXAMINATION AT A HEALTH CARE FACILITY: Primary | ICD-10-CM

## 2018-04-23 DIAGNOSIS — Z12.4 SCREENING FOR MALIGNANT NEOPLASM OF CERVIX: ICD-10-CM

## 2018-04-23 PROCEDURE — 99396 PREV VISIT EST AGE 40-64: CPT | Performed by: PHYSICIAN ASSISTANT

## 2018-04-23 PROCEDURE — G0145 SCR C/V CYTO,THINLAYER,RESCR: HCPCS | Performed by: PHYSICIAN ASSISTANT

## 2018-04-23 PROCEDURE — 87624 HPV HI-RISK TYP POOLED RSLT: CPT | Performed by: PHYSICIAN ASSISTANT

## 2018-04-23 ASSESSMENT — PAIN SCALES - GENERAL: PAINLEVEL: NO PAIN (0)

## 2018-04-23 NOTE — NURSING NOTE
"Chief Complaint   Patient presents with     Physical       Initial BP (!) 144/93  Pulse 89  Temp 98.1  F (36.7  C) (Oral)  Resp 20  Wt 188 lb (85.3 kg)  SpO2 98%  BMI 30.81 kg/m2 Estimated body mass index is 30.81 kg/(m^2) as calculated from the following:    Height as of 2/26/18: 5' 5.5\" (1.664 m).    Weight as of this encounter: 188 lb (85.3 kg).  Medication Reconciliation: complete    TIA Payne MA    "

## 2018-04-23 NOTE — PATIENT INSTRUCTIONS

## 2018-04-23 NOTE — PROGRESS NOTES
SUBJECTIVE:   CC: Amarilis Vazquez is an 58 year old woman who presents for preventive health visit.     Physical   Annual:     Getting at least 3 servings of Calcium per day::  Yes    Bi-annual eye exam::  Yes    Dental care twice a year::  Yes    Sleep apnea or symptoms of sleep apnea::  None    Diet::  Diabetic    Frequency of exercise::  1 day/week    Duration of exercise::  15-30 minutes    Taking medications regularly::  Yes            Check mole one left knee    Today's PHQ-2 Score:   PHQ-2 ( 1999 [employer]) 4/23/2018   Q1: Little interest or pleasure in doing things 0   Q2: Feeling down, depressed or hopeless 0   PHQ-2 Score 0   Q1: Little interest or pleasure in doing things Not at all   Q2: Feeling down, depressed or hopeless Not at all   PHQ-2 Score 0       Abuse: Current or Past(Physical, Sexual or Emotional)- No  Do you feel safe in your environment - Yes    Social History   Substance Use Topics     Smoking status: Former Smoker     Packs/day: 1.00     Years: 20.00     Types: Cigarettes     Start date: 1/1/1976     Quit date: 1/1/2005     Smokeless tobacco: Never Used      Comment: quit 8-9 years ago     Alcohol use Yes      Comment: seldom     Alcohol Use 4/23/2018   If you drink alcohol do you typically have greater than 3 drinks per day OR greater than 7 drinks per week? Not Applicable   No flowsheet data found.    Reviewed orders with patient.  Reviewed health maintenance and updated orders accordingly - Yes  BP Readings from Last 3 Encounters:   04/23/18 136/84   02/26/18 132/80   08/23/17 132/80    Wt Readings from Last 3 Encounters:   04/23/18 188 lb (85.3 kg)   03/30/18 187 lb (84.8 kg)   02/26/18 181 lb (82.1 kg)                  Patient Active Problem List   Diagnosis     HTN, goal below 140/90     Hypothyroidism     CAD (coronary artery disease)     Obesity     Coronary artery disease involving native coronary artery of native heart without angina pectoris     Mixed hyperlipidemia     Advanced  directives, counseling/discussion     Type 2 diabetes mellitus with microalbuminuria, without long-term current use of insulin (H)     Past Surgical History:   Procedure Laterality Date     D & C  1979       Social History   Substance Use Topics     Smoking status: Former Smoker     Packs/day: 1.00     Years: 20.00     Types: Cigarettes     Start date: 1/1/1976     Quit date: 1/1/2005     Smokeless tobacco: Never Used      Comment: quit 8-9 years ago     Alcohol use Yes      Comment: seldom     Family History   Problem Relation Age of Onset     CEREBROVASCULAR DISEASE Mother      CANCER Mother      lung     C.A.D. Mother      MI age 50?     C.A.D. Paternal Grandfather 50     MI     C.A.D. Maternal Grandmother      DIABETES Maternal Grandmother      Thyroid Disease Maternal Grandmother      C.A.D. Maternal Grandfather      Thyroid Disease Son      Breast Cancer No family hx of      Gynecology No family hx of      No ovarian, endometrial cancer     OSTEOPOROSIS No family hx of      Ovarian Cancer No family hx of      Cancer - colorectal No family hx of          Current Outpatient Prescriptions   Medication Sig Dispense Refill     Alpha Lipoic Acid 200 MG CAPS        aspirin 81 MG tablet Take 1 tablet by mouth daily.       B Complex Vitamins (B COMPLEX PO) Super B complex       Coenzyme Q10 (CO Q 10 PO) Take  by mouth.       cyanocobalamin 1000 MCG SUBL sublingual tablet Place 1,000 mcg under the tongue daily       Fish Oil OIL 1,400 mg       Flaxseed, Linseed, (FLAXSEED OIL) 1000 MG CAPS Take  by mouth.       insulin pen needle 32G X 4 MM Use 1 pen needles daily or as directed. 100 each 5     levothyroxine (SYNTHROID/LEVOTHROID) 100 MCG tablet Take 1 tablet (100 mcg) by mouth daily 90 tablet 1     liraglutide (VICTOZA) 18 MG/3ML soln Inject 1.2 mg Subcutaneous daily 18 mL 1     lisinopril-hydrochlorothiazide (PRINZIDE/ZESTORETIC) 20-25 MG per tablet Take 1 tablet by mouth daily 90 tablet 1     Magnesium 400 MG TABS         metFORMIN (GLUCOPHAGE-XR) 500 MG 24 hr tablet TAKE 2 TABLETS BY MOUTH TWICE A  tablet 1     Multiple Vitamins-Minerals (COMPLETE MULTIVITAMIN/MINERAL PO) Take  by mouth.       rosuvastatin (CRESTOR) 5 MG tablet Take 1 tablet (5 mg) by mouth daily 90 tablet 2     No Known Allergies    Patient over age 50, mutual decision to screen reflected in health maintenance.    Pertinent mammograms are reviewed under the imaging tab.  History of abnormal Pap smear:   NO - age 30- 65 PAP every 3 years recommended  Last 3 Pap and HPV Results:   PAP / HPV 3/6/2015 1/27/2012   PAP NIL OTHER-NIL, See Result       Reviewed and updated as needed this visit by clinical staff  Tobacco  Allergies  Meds  Problems  Med Hx  Surg Hx  Fam Hx  Soc Hx          Reviewed and updated as needed this visit by Provider  Allergies  Meds  Problems        Past Medical History:   Diagnosis Date     Coronary artery disease      Diabetic [facility] exam (H) [facility]12/11    [facility]     Diabetic eye exam (H) 01/28/12, 1/6/14    [facility]     Other and unspecified hyperlipidemia      Type II or unspecified type diabetes mellitus without mention of complication, not stated as uncontrolled     Diabetes mellitus     Unspecified essential hypertension      Unspecified hypothyroidism     Hypothyroidism      Past Surgical History:   Procedure Laterality Date     D & C  1979       Review of Systems  CONSTITUTIONAL: NEGATIVE for fever, chills, change in weight  INTEGUMENTARY/SKIN: NEGATIVE for worrisome rashes, moles or lesions  EYES: NEGATIVE for vision changes or irritation  ENT: NEGATIVE for ear, mouth and throat problems  RESP: NEGATIVE for significant cough or SOB  BREAST: NEGATIVE for masses, tenderness or discharge  CV: NEGATIVE for chest pain, palpitations or peripheral edema  GI: NEGATIVE for nausea, abdominal pain, heartburn, or change in bowel habits  : NEGATIVE for unusual urinary or vaginal symptoms. No vaginal  bleeding.  MUSCULOSKELETAL: NEGATIVE for significant arthralgias or myalgia  NEURO: NEGATIVE for weakness, dizziness or paresthesias  ENDOCRINE: NEGATIVE for temperature intolerance, skin/hair changes  PSYCHIATRIC: NEGATIVE for changes in mood or affect      OBJECTIVE:   /84  Pulse 89  Temp 98.1  F (36.7  C) (Oral)  Resp 20  Wt 188 lb (85.3 kg)  SpO2 98%  BMI 30.81 kg/m2  Physical Exam  GENERAL APPEARANCE: healthy, alert and no distress  EYES: Eyes grossly normal to inspection, PERRL and conjunctivae and sclerae normal  HENT: ear canals and TM's normal, nose and mouth without ulcers or lesions, oropharynx clear and oral mucous membranes moist  NECK: no adenopathy, no asymmetry, masses, or scars and thyroid normal to palpation  RESP: lungs clear to auscultation - no rales, rhonchi or wheezes  BREAST: normal without masses, tenderness or nipple discharge and no palpable axillary masses or adenopathy  CV: regular rate and rhythm, normal S1 S2, no S3 or S4, no murmur, click or rub, no peripheral edema and peripheral pulses strong  ABDOMEN: soft, nontender, no hepatosplenomegaly, no masses and bowel sounds normal   (female): normal female external genitalia, normal urethral meatus, vaginal mucosal atrophy noted, normal cervix, adnexae, and uterus without masses or abnormal discharge  MS: no musculoskeletal defects are noted and gait is age appropriate without ataxia  SKIN: benign appearing mole on the left knee.   NEURO: Normal strength and tone, sensory exam grossly normal, mentation intact and speech normal  PSYCH: mentation appears normal and affect normal/bright    ASSESSMENT/PLAN:   1. Routine general medical examination at a health care facility  Health maintenance reviewed and updated.    2. Visit for screening mammogram  - MA SCREENING DIGITAL BILAT - Future  (s+30); Future    3. Screening for malignant neoplasm of cervix  - Pap imaged thin layer screen with HPV - recommended age 30 - 65  - HPV High  "Risk Types DNA Cervical    COUNSELING:  Reviewed preventive health counseling, as reflected in patient instructions       Regular exercise       Healthy diet/nutrition       Colon cancer screening       reports that she quit smoking about 13 years ago. Her smoking use included Cigarettes. She started smoking about 42 years ago. She has a 20.00 pack-year smoking history. She has never used smokeless tobacco.    Estimated body mass index is 30.81 kg/(m^2) as calculated from the following:    Height as of 2/26/18: 5' 5.5\" (1.664 m).    Weight as of this encounter: 188 lb (85.3 kg).   Weight management plan: Discussed healthy diet and exercise guidelines and patient will follow up in 12 months in clinic to re-evaluate.    Counseling Resources:  ATP IV Guidelines  Pooled Cohorts Equation Calculator  Breast Cancer Risk Calculator  FRAX Risk Assessment  ICSI Preventive Guidelines  Dietary Guidelines for Americans, 2010  USDA's MyPlate  ASA Prophylaxis  Lung CA Screening    Kristen M. Kehr, PA-C  [facility]  "

## 2018-04-23 NOTE — MR AVS SNAPSHOT
After Visit Summary   4/23/2018    Amarilis Vazquez    MRN: [id number]           Patient Information     Date Of Birth          1960        Visit Information        Provider Department      4/23/2018 10:00 AM Kehr, Kristen M, PA-C [facility]        Today's Diagnoses     Routine general medical examination at a health care facility    -  1    Visit for screening mammogram        Screening for malignant neoplasm of cervix          Care Instructions      Preventive Health Recommendations  Female Ages 50 - 64    Yearly exam: See your health care provider every year in order to  o Review health changes.   o Discuss preventive care.    o Review your medicines if your doctor has prescribed any.      Get a Pap test every three years (unless you have an abnormal result and your provider advises testing more often).    If you get Pap tests with HPV test, you only need to test every 5 years, unless you have an abnormal result.     You do not need a Pap test if your uterus was removed (hysterectomy) and you have not had cancer.    You should be tested each year for STDs (sexually transmitted diseases) if you're at risk.     Have a mammogram every 1 to 2 years.    Have a colonoscopy at age 50, or have a yearly FIT test (stool test). These exams screen for colon cancer.      Have a cholesterol test every 5 years, or more often if advised.    Have a diabetes test (fasting glucose) every three years. If you are at risk for diabetes, you should have this test more often.     If you are at risk for osteoporosis (brittle bone disease), think about having a bone density scan (DEXA).    Shots: Get a flu shot each year. Get a tetanus shot every 10 years.    Nutrition:     Eat at least 5 servings of fruits and vegetables each day.    Eat whole-grain bread, whole-wheat pasta and brown rice instead of white grains and rice.    Talk to your provider about Calcium and Vitamin D.     Lifestyle    Exercise at  least 150 minutes a week (30 minutes a day, 5 days a week). This will help you control your weight and prevent disease.    Limit alcohol to one drink per day.    No smoking.     Wear sunscreen to prevent skin cancer.     See your dentist every six months for an exam and cleaning.    See your eye doctor every 1 to 2 years.    Recheck blood pressure            Follow-ups after your visit        Your next 10 appointments already scheduled     May 11, 2018  9:00 AM CDT   Diabetic Education with [facility] DIABETIC ED RESOURCE   [facility] [place] ([facility] [place])    [address]  [place] [id number] [phone]            May 14, 2018 11:00 AM CDT   (Arrive by 10:45 AM)   MA SCREENING DIGITAL BILATERAL with [id number]   [facility] ([facility])    [address] [place] [address]  [place] [id number] [phone]           Do not use any powder, lotion or deodorant under your arms or on your breast. If you do, we will ask you to remove it before your exam.  Wear comfortable, two-piece clothing.  If you have any allergies, tell your care team.  Bring any previous mammograms from other facilities or have them mailed to the breast center.            Aug 21, 2018  8:00 AM CDT   SHORT with Kristen M Kehr, PA-C   [facility] ([facility])    [address] [address] [phone]              Future tests that were ordered for you today     Open Future Orders        Priority Expected Expires Ordered    MA SCREENING DIGITAL BILAT - Future  (s+30) Routine  4/23/2019 4/23/2018            Who to contact     If you have questions or need follow up information about today's clinic visit or your schedule please contact [facility] directly at [phone].  Normal or non-critical lab and imaging results will be communicated to you by MyChart, letter or phone within 4 business days after the clinic has received the  results. If you do not hear from us within 7 days, please contact the clinic through [employer] or phone. If you have a critical or abnormal lab result, we will notify you by phone as soon as possible.  Submit refill requests through [employer] or call your pharmacy and they will forward the refill request to us. Please allow 3 business days for your refill to be completed.          Additional Information About Your Visit        [employer] Information     [employer] gives you secure access to your electronic health record. If you see a primary care provider, you can also send messages to your care team and make appointments. If you have questions, please call your primary care clinic.  If you do not have a primary care provider, please call [phone] and they will assist you.        Care EveryWhere ID     This is your Care EveryWhere ID. This could be used by other organizations to access your [place] medical records  [id number]        Your Vitals Were     Pulse Temperature Respirations Pulse Oximetry BMI (Body Mass Index)       89 98.1  F (36.7  C) (Oral) 20 98% 30.81 kg/m2        Blood Pressure from Last 3 Encounters:   04/23/18 136/84   02/26/18 132/80   08/23/17 132/80    Weight from Last 3 Encounters:   04/23/18 188 lb (85.3 kg)   03/30/18 187 lb (84.8 kg)   02/26/18 181 lb (82.1 kg)              We Performed the Following     HPV High Risk Types DNA Cervical     Pap imaged thin layer screen with HPV - recommended age 30 - 65        Primary Care Provider Office Phone # Fax #    Kristen M Kehr, PA-C [phone] [address] [facility] [address]        Equal Access to Services     [facility] : Hadii taiwo [place], waaxda luqadaha, qaybta kaalhansa steele. So [facility] [phone].    ATENCIÓN: Si habla español, tiene a stroud disposición servicios gratuitos de asistencia lingüística. Llame al [phone].    We comply with applicable federal  civil rights laws and [place] laws. We do not discriminate on the basis of race, color, national origin, age, disability, sex, sexual orientation, or gender identity.            Thank you!     Thank you for choosing [facility]  [facility] your [facility]. Our goal is always to provide you with excellent care. Hearing back from our patients is one way we can continue to improve our services. Please take a few minutes to complete the written survey that you may receive in the mail after your visit with us. Thank you!             Your Updated Medication List - Protect others around you: Learn how to safely use, store and throw away your medicines at www.disposemymeds.org.          This list is accurate as of 4/23/18 10:52 AM.  Always use your most recent med list.                   Brand Name Dispense Instructions for use Diagnosis    Alpha Lipoic Acid 200 MG Caps           aspirin 81 MG tablet      Take 1 tablet by mouth daily.        B COMPLEX PO      Super B complex        CO Q 10 PO      Take  by mouth.        COMPLETE MULTIVITAMIN/MINERAL PO      Take  by mouth.        cyanocobalamin 1000 MCG Subl sublingual tablet      Place 1,000 mcg under the tongue daily        Fish Oil Oil      1,400 mg        flaxseed oil 1000 MG Caps      Take  by mouth.        insulin pen needle 32G X 4 MM     100 each    Use 1 pen needles daily or as directed.    Type 2 diabetes mellitus without complication, without long-term current use of insulin (H)       levothyroxine 100 MCG tablet    SYNTHROID/LEVOTHROID    90 tablet    Take 1 tablet (100 mcg) by mouth daily    Hypothyroidism, unspecified type       liraglutide 18 MG/3ML soln    VICTOZA    18 mL    Inject 1.2 mg Subcutaneous daily    Type 2 diabetes mellitus with microalbuminuria, without long-term current use of insulin (H)       lisinopril-hydrochlorothiazide 20-25 MG per tablet    PRINZIDE/ZESTORETIC    90 tablet    Take 1 tablet by mouth daily    Benign essential  hypertension, Type 2 diabetes mellitus with microalbuminuria, without long-term current use of insulin (H)       Magnesium 400 MG Tabs           metFORMIN 500 MG 24 hr tablet    GLUCOPHAGE-XR    360 tablet    TAKE 2 TABLETS BY MOUTH TWICE A DAY    Type 2 diabetes mellitus with microalbuminuria, without long-term current use of insulin (H)       rosuvastatin 5 MG tablet    CRESTOR    90 tablet    Take 1 tablet (5 mg) by mouth daily    Mixed hyperlipidemia, Type 2 diabetes mellitus without complication, without long-term current use of insulin (H)

## 2018-04-25 LAB
COPATH REPORT: NORMAL
PAP: NORMAL

## 2018-04-26 LAB
FINAL DIAGNOSIS: NORMAL
HPV HR 12 DNA CVX QL NAA+PROBE: NEGATIVE
HPV16 DNA SPEC QL NAA+PROBE: NEGATIVE
HPV18 DNA SPEC QL NAA+PROBE: NEGATIVE
SPECIMEN DESCRIPTION: NORMAL
SPECIMEN SOURCE CVX/VAG CYTO: NORMAL

## 2018-05-14 ENCOUNTER — RADIANT APPOINTMENT (OUTPATIENT)
Dept: MAMMOGRAPHY | Facility: CLINIC | Age: 58
End: 2018-05-14
Payer: COMMERCIAL

## 2018-05-14 DIAGNOSIS — Z12.31 VISIT FOR SCREENING MAMMOGRAM: ICD-10-CM

## 2018-05-14 PROCEDURE — 77067 SCR MAMMO BI INCL CAD: CPT | Mod: TC

## 2018-05-14 PROCEDURE — 77063 BREAST TOMOSYNTHESIS BI: CPT | Mod: TC

## 2018-06-21 DIAGNOSIS — E03.9 HYPOTHYROIDISM, UNSPECIFIED TYPE: ICD-10-CM

## 2018-06-21 RX ORDER — LEVOTHYROXINE SODIUM 100 UG/1
TABLET ORAL
Qty: 90 TABLET | Refills: 2 | Status: SHIPPED | OUTPATIENT
Start: 2018-06-21 | End: 2019-03-22

## 2018-07-17 ENCOUNTER — MYC MEDICAL ADVICE (OUTPATIENT)
Dept: FAMILY MEDICINE | Facility: CLINIC | Age: 58
End: 2018-07-17

## 2018-07-17 DIAGNOSIS — L98.9 SKIN LESION: Primary | ICD-10-CM

## 2018-07-17 DIAGNOSIS — G47.00 INSOMNIA, UNSPECIFIED TYPE: ICD-10-CM

## 2018-07-17 NOTE — TELEPHONE ENCOUNTER
Patient just had physical with PCP in April.   Requesting referral to derm for concerning moles and another referral to sleep specialist for GUERO.    RN pended both referrals.   Routing to provider to advise.  DORON MarioN RN

## 2018-07-19 ENCOUNTER — TELEPHONE (OUTPATIENT)
Dept: FAMILY MEDICINE | Facility: CLINIC | Age: 58
End: 2018-07-19

## 2018-07-19 NOTE — TELEPHONE ENCOUNTER
Patient is calling stating provider referred patient to see [facility], but clinic is requesting a referral. Patient is calling would like care team to call over a referral to clinic today phone: [phone]. Patient request a call back when referral is sent to [facility] [place]. Thank you.

## 2018-07-19 NOTE — TELEPHONE ENCOUNTER
Faxed referral to [facility] [place], advised to check with insurance for coverage.  CHARLIE Kaplan

## 2018-07-26 DIAGNOSIS — E78.2 MIXED HYPERLIPIDEMIA: ICD-10-CM

## 2018-07-26 DIAGNOSIS — E11.9 TYPE 2 DIABETES MELLITUS WITHOUT COMPLICATION, WITHOUT LONG-TERM CURRENT USE OF INSULIN (H): ICD-10-CM

## 2018-07-26 RX ORDER — ROSUVASTATIN CALCIUM 5 MG/1
TABLET, COATED ORAL
Qty: 90 TABLET | Refills: 0 | Status: SHIPPED | OUTPATIENT
Start: 2018-07-26 | End: 2018-09-05

## 2018-09-04 ENCOUNTER — OFFICE VISIT (OUTPATIENT)
Dept: FAMILY MEDICINE | Facility: CLINIC | Age: 58
End: 2018-09-04
Payer: COMMERCIAL

## 2018-09-04 VITALS
WEIGHT: 181 LBS | HEART RATE: 91 BPM | SYSTOLIC BLOOD PRESSURE: 136 MMHG | OXYGEN SATURATION: 100 % | BODY MASS INDEX: 29.66 KG/M2 | DIASTOLIC BLOOD PRESSURE: 82 MMHG | TEMPERATURE: 97.8 F | RESPIRATION RATE: 16 BRPM

## 2018-09-04 DIAGNOSIS — I10 BENIGN ESSENTIAL HYPERTENSION: ICD-10-CM

## 2018-09-04 DIAGNOSIS — E11.29 TYPE 2 DIABETES MELLITUS WITH MICROALBUMINURIA, WITHOUT LONG-TERM CURRENT USE OF INSULIN (H): Primary | ICD-10-CM

## 2018-09-04 DIAGNOSIS — I25.10 CORONARY ARTERY DISEASE INVOLVING NATIVE CORONARY ARTERY OF NATIVE HEART WITHOUT ANGINA PECTORIS: ICD-10-CM

## 2018-09-04 DIAGNOSIS — E78.2 MIXED HYPERLIPIDEMIA: ICD-10-CM

## 2018-09-04 DIAGNOSIS — R80.9 TYPE 2 DIABETES MELLITUS WITH MICROALBUMINURIA, WITHOUT LONG-TERM CURRENT USE OF INSULIN (H): Primary | ICD-10-CM

## 2018-09-04 LAB
CREAT UR-MCNC: 62 MG/DL
HBA1C MFR BLD: 8.1 % (ref 0–5.6)
MICROALBUMIN UR-MCNC: 12 MG/L
MICROALBUMIN/CREAT UR: 19.26 MG/G CR (ref 0–25)

## 2018-09-04 PROCEDURE — 83036 HEMOGLOBIN GLYCOSYLATED A1C: CPT | Performed by: PHYSICIAN ASSISTANT

## 2018-09-04 PROCEDURE — 36415 COLL VENOUS BLD VENIPUNCTURE: CPT | Performed by: PHYSICIAN ASSISTANT

## 2018-09-04 PROCEDURE — 99214 OFFICE O/P EST MOD 30 MIN: CPT | Performed by: PHYSICIAN ASSISTANT

## 2018-09-04 PROCEDURE — 82043 UR ALBUMIN QUANTITATIVE: CPT | Performed by: PHYSICIAN ASSISTANT

## 2018-09-04 ASSESSMENT — PAIN SCALES - GENERAL: PAINLEVEL: NO PAIN (0)

## 2018-09-04 NOTE — PROGRESS NOTES
SUBJECTIVE:   Amarilis Vazquez is a 58 year old female who presents to clinic today for the following health issues:      Diabetes Follow-up    Patient is checking blood sugars: once daily.  Results are as follows:         am - 140-160    Diabetic concerns: None     Symptoms of hypoglycemia (low blood sugar): none     Paresthesias (numbness or burning in feet) or sores: Yes      Date of last diabetic eye exam: 01/08/18    BP Readings from Last 2 Encounters:   04/23/18 136/84   02/26/18 132/80     Hemoglobin A1C (%)   Date Value   02/26/2018 7.9 (H)   08/23/2017 7.3 (H)     LDL Cholesterol Calculated (mg/dL)   Date Value   02/26/2018 59   08/23/2017 156 (H)       Diabetes Management Resources    Amount of exercise or physical activity:     Problems taking medications regularly: No    Medication side effects: none    Diet:           Problem list and histories reviewed & adjusted, as indicated.  Additional history: as documented    Patient Active Problem List   Diagnosis     HTN, goal below 140/90     Hypothyroidism     CAD (coronary artery disease)     Obesity     Coronary artery disease involving native coronary artery of native heart without angina pectoris     Mixed hyperlipidemia     Advanced directives, counseling/discussion     Type 2 diabetes mellitus with microalbuminuria, without long-term current use of insulin (H)     Insomnia, unspecified type     Past Surgical History:   Procedure Laterality Date     D & C  1979       Social History   Substance Use Topics     Smoking status: Former Smoker     Packs/day: 1.00     Years: 20.00     Types: Cigarettes     Start date: 1/1/1976     Quit date: 1/1/2005     Smokeless tobacco: Never Used      Comment: quit 8-9 years ago     Alcohol use Yes      Comment: seldom     Family History   Problem Relation Age of Onset     Cerebrovascular Disease Mother      Cancer Mother      lung     C.A.D. Mother      MI age 50?     C.A.D. Paternal Grandfather 50     MI     C.A.D.  Maternal Grandmother      Diabetes Maternal Grandmother      Thyroid Disease Maternal Grandmother      SENG. Maternal Grandfather      Thyroid Disease Son      Breast Cancer No family hx of      Gynecology No family hx of      No ovarian, endometrial cancer     Osteoporosis No family hx of      Ovarian Cancer No family hx of      Cancer - colorectal No family hx of          Current Outpatient Prescriptions   Medication Sig Dispense Refill     Alpha Lipoic Acid 200 MG CAPS        aspirin 81 MG tablet Take 1 tablet by mouth daily.       B Complex Vitamins (B COMPLEX PO) Super B complex       Coenzyme Q10 (CO Q 10 PO) Take  by mouth.       cyanocobalamin 1000 MCG SUBL sublingual tablet Place 1,000 mcg under the tongue daily       Fish Oil OIL 1,400 mg       Flaxseed, Linseed, (FLAXSEED OIL) 1000 MG CAPS Take  by mouth.       insulin pen needle 32G X 4 MM Use 1 pen needles daily or as directed. 100 each 5     levothyroxine (SYNTHROID/LEVOTHROID) 100 MCG tablet TAKE 1 TABLET (100 MCG) BY MOUTH DAILY 90 tablet 2     liraglutide (VICTOZA) 18 MG/3ML soln Inject 1.2 mg Subcutaneous daily 18 mL 1     lisinopril-hydrochlorothiazide (PRINZIDE/ZESTORETIC) 20-25 MG per tablet Take 1 tablet by mouth daily 90 tablet 1     Magnesium 400 MG TABS        metFORMIN (GLUCOPHAGE-XR) 500 MG 24 hr tablet TAKE 2 TABLETS BY MOUTH TWICE A  tablet 1     Multiple Vitamins-Minerals (COMPLETE MULTIVITAMIN/MINERAL PO) Take  by mouth.       rosuvastatin (CRESTOR) 5 MG tablet TAKE 1 TABLET BY MOUTH EVERY DAY 90 tablet 0     No Known Allergies  BP Readings from Last 3 Encounters:   09/04/18 136/82   04/23/18 136/84   02/26/18 132/80    Wt Readings from Last 3 Encounters:   09/04/18 181 lb (82.1 kg)   04/23/18 188 lb (85.3 kg)   03/30/18 187 lb (84.8 kg)                    Reviewed and updated as needed this visit by clinical staff       Reviewed and updated as needed this visit by Provider         ROS:  Constitutional, HEENT, cardiovascular,  pulmonary, gi and gu systems are negative, except as otherwise noted.    OBJECTIVE:     /82  Pulse 91  Temp 97.8  F (36.6  C) (Oral)  Resp 16  Wt 181 lb (82.1 kg)  SpO2 100%  BMI 29.66 kg/m2  Body mass index is 29.66 kg/(m^2).  GENERAL: healthy, alert and no distress  EYES: Eyes grossly normal to inspection, PERRL and conjunctivae and sclerae normal  RESP: lungs clear to auscultation - no rales, rhonchi or wheezes  CV: regular rate and rhythm, normal S1 S2, no S3 or S4, no murmur, click or rub, no peripheral edema and peripheral pulses strong  ABDOMEN: soft, nontender, no hepatosplenomegaly, no masses and bowel sounds normal  SKIN: no suspicious lesions or rashes  NEURO: Normal strength and tone, mentation intact and speech normal  PSYCH: mentation appears normal, flat and tearful due to recent loss of her aunt    Diagnostic Test Results:  A1C pending     ASSESSMENT/PLAN:       1. Type 2 diabetes mellitus with microalbuminuria, without long-term current use of insulin (H)  If A1C is above 8%, then she will increase the victoza to 1.8 mg / day.   Encouraged lifestyle changes with diet and exercise.   She is not interested in meeting with Diabetes Education.   All fasting lab tests will be due in 6 months.     - HEMOGLOBIN A1C  - Albumin Random Urine Quantitative with Creat Ratio  - liraglutide (VICTOZA) 18 MG/3ML soln; Inject 1.8 mg Subcutaneous daily  Dispense: 18 mL; Refill: 3  - lisinopril-hydrochlorothiazide (PRINZIDE/ZESTORETIC) 20-25 MG per tablet; Take 1 tablet by mouth daily  Dispense: 90 tablet; Refill: 1  - metFORMIN (GLUCOPHAGE-XR) 500 MG 24 hr tablet; TAKE 2 TABLETS BY MOUTH TWICE A DAY  Dispense: 360 tablet; Refill: 1    2. Benign essential hypertension  Stable, refills given  - lisinopril-hydrochlorothiazide (PRINZIDE/ZESTORETIC) 20-25 MG per tablet; Take 1 tablet by mouth daily  Dispense: 90 tablet; Refill: 1    3. Mixed hyperlipidemia  Stable, refills given  - rosuvastatin (CRESTOR) 5 MG  tablet; Take 1 tablet (5 mg) by mouth daily  Dispense: 90 tablet; Refill: 3    4. Coronary artery disease involving native coronary artery of native heart without angina pectoris  - rosuvastatin (CRESTOR) 5 MG tablet; Take 1 tablet (5 mg) by mouth daily  Dispense: 90 tablet; Refill: 3        Kristen M. Kehr, PA-C  [facility]

## 2018-09-04 NOTE — MR AVS SNAPSHOT
After Visit Summary   9/4/2018    Amarilis Vazquez    MRN: [id number]           Patient Information     Date Of Birth          1960        Visit Information        Provider Department      9/4/2018 8:00 AM Kehr, Kristen M, PA-C [facility]        Today's Diagnoses     Type 2 diabetes mellitus with microalbuminuria, without long-term current use of insulin (H)    -  1       Follow-ups after your visit        Follow-up notes from your care team     Return in about 6 months (around 3/4/2019) for diabetes, Lab Work (fasting).      Who to contact     If you have questions or need follow up information about today's clinic visit or your schedule please contact [facility] directly at [phone].  Normal or non-critical lab and imaging results will be communicated to you by MyChart, letter or phone within 4 business days after the clinic has received the results. If you do not hear from us within 7 days, please contact the clinic through [employer] or phone. If you have a critical or abnormal lab result, we will notify you by phone as soon as possible.  Submit refill requests through [employer] or call your pharmacy and they will forward the refill request to us. Please allow 3 business days for your refill to be completed.          Additional Information About Your Visit        MyChart Information     [employer] gives you secure access to your electronic health record. If you see a primary care provider, you can also send messages to your care team and make appointments. If you have questions, please call your primary care clinic.  If you do not have a primary care provider, please call [phone] and they will assist you.        Care EveryWhere ID     This is your Care EveryWhere ID. This could be used by other organizations to access your [place] medical records  [id number]        Your Vitals Were     Pulse Temperature Respirations Pulse Oximetry BMI (Body Mass Index)       91  97.8  F (36.6  C) (Oral) 16 100% 29.66 kg/m2        Blood Pressure from Last 3 Encounters:   09/04/18 (!) 142/92   04/23/18 136/84   02/26/18 132/80    Weight from Last 3 Encounters:   09/04/18 181 lb (82.1 kg)   04/23/18 188 lb (85.3 kg)   03/30/18 187 lb (84.8 kg)              We Performed the Following     Albumin Random Urine Quantitative with Creat Ratio     HEMOGLOBIN A1C        Primary Care Provider Office Phone # Fax #    Kristen M Kehr, PA-C [phone] [address] [facility] [address]        Equal Access to Services     BELLA SUNG : Hadii aad ku hadasho Sojoe, waaxda luqadaha, qaybta kaalmada adeegyada, hansa andino . So [facility] [phone].    ATENCIÓN: Si habla español, tiene a stroud disposición servicios gratuitos de asistencia lingüística. Llame al [phone].    We comply with applicable federal civil rights laws and [place] laws. We do not discriminate on the basis of race, color, national origin, age, disability, sex, sexual orientation, or gender identity.            Thank you!     Thank you for choosing [facility]  for your care. Our goal is always to provide you with excellent care. Hearing back from our patients is one way we can continue to improve our services. Please take a few minutes to complete the written survey that you may receive in the mail after your visit with us. Thank you!             Your Updated Medication List - Protect others around you: Learn how to safely use, store and throw away your medicines at www.disposemymeds.org.          This list is accurate as of 9/4/18  8:37 AM.  Always use your most recent med list.                   Brand Name Dispense Instructions for use Diagnosis    Alpha Lipoic Acid 200 MG Caps           aspirin 81 MG tablet      Take 1 tablet by mouth daily.        B COMPLEX PO      Super B complex        CO Q 10 PO      Take  by mouth.        COMPLETE MULTIVITAMIN/MINERAL PO      Take   by mouth.        cyanocobalamin 1000 MCG Subl sublingual tablet      Place 1,000 mcg under the tongue daily        Fish Oil Oil      1,400 mg        flaxseed oil 1000 MG Caps      Take  by mouth.        insulin pen needle 32G X 4 MM     100 each    Use 1 pen needles daily or as directed.    Type 2 diabetes mellitus without complication, without long-term current use of insulin (H)       levothyroxine 100 MCG tablet    SYNTHROID/LEVOTHROID    90 tablet    TAKE 1 TABLET (100 MCG) BY MOUTH DAILY    Hypothyroidism, unspecified type       liraglutide 18 MG/3ML soln    VICTOZA    18 mL    Inject 1.2 mg Subcutaneous daily    Type 2 diabetes mellitus with microalbuminuria, without long-term current use of insulin (H)       lisinopril-hydrochlorothiazide 20-25 MG per tablet    PRINZIDE/ZESTORETIC    90 tablet    Take 1 tablet by mouth daily    Benign essential hypertension, Type 2 diabetes mellitus with microalbuminuria, without long-term current use of insulin (H)       Magnesium 400 MG Tabs           metFORMIN 500 MG 24 hr tablet    GLUCOPHAGE-XR    360 tablet    TAKE 2 TABLETS BY MOUTH TWICE A DAY    Type 2 diabetes mellitus with microalbuminuria, without long-term current use of insulin (H)       rosuvastatin 5 MG tablet    CRESTOR    90 tablet    TAKE 1 TABLET BY MOUTH EVERY DAY    Mixed hyperlipidemia, Type 2 diabetes mellitus without complication, without long-term current use of insulin (H)

## 2018-09-04 NOTE — NURSING NOTE
"Chief Complaint   Patient presents with     Diabetes       Initial BP (!) 142/92  Pulse 91  Temp 97.8  F (36.6  C) (Oral)  Resp 16  Wt 181 lb (82.1 kg)  SpO2 100%  BMI 29.66 kg/m2 Estimated body mass index is 29.66 kg/(m^2) as calculated from the following:    Height as of 2/26/18: 5' 5.5\" (1.664 m).    Weight as of this encounter: 181 lb (82.1 kg).  Medication Reconciliation: complete    TIA Payne MA    "

## 2018-09-05 ENCOUNTER — TELEPHONE (OUTPATIENT)
Dept: FAMILY MEDICINE | Facility: CLINIC | Age: 58
End: 2018-09-05

## 2018-09-05 NOTE — TELEPHONE ENCOUNTER
Patient is calling stating results from previous labs done popped up on [employer] and would like to discuss details with care team/RN. Please call to discuss. Thank you.

## 2018-09-05 NOTE — TELEPHONE ENCOUNTER
Call to patient, requests to explain urine results.  Patient informed, these are normal.  Verbalized good understanding.   Rosa Rodriguez RN

## 2018-09-08 RX ORDER — METFORMIN HCL 500 MG
TABLET, EXTENDED RELEASE 24 HR ORAL
Qty: 360 TABLET | Refills: 1 | Status: SHIPPED | OUTPATIENT
Start: 2018-09-08 | End: 2019-04-30

## 2018-09-08 RX ORDER — LISINOPRIL AND HYDROCHLOROTHIAZIDE 20; 25 MG/1; MG/1
1 TABLET ORAL DAILY
Qty: 90 TABLET | Refills: 1 | Status: SHIPPED | OUTPATIENT
Start: 2018-09-08 | End: 2019-04-30

## 2018-09-08 RX ORDER — LIRAGLUTIDE 6 MG/ML
1.8 INJECTION SUBCUTANEOUS DAILY
Qty: 18 ML | Refills: 3 | Status: SHIPPED | OUTPATIENT
Start: 2018-09-08 | End: 2018-11-08

## 2018-09-08 RX ORDER — ROSUVASTATIN CALCIUM 5 MG/1
5 TABLET, COATED ORAL DAILY
Qty: 90 TABLET | Refills: 3 | Status: SHIPPED | OUTPATIENT
Start: 2018-09-08 | End: 2019-04-30

## 2018-09-10 ENCOUNTER — TRANSFERRED RECORDS (OUTPATIENT)
Dept: HEALTH INFORMATION MANAGEMENT | Facility: CLINIC | Age: 58
End: 2018-09-10

## 2018-09-17 DIAGNOSIS — R80.9 TYPE 2 DIABETES MELLITUS WITH MICROALBUMINURIA, WITHOUT LONG-TERM CURRENT USE OF INSULIN (H): ICD-10-CM

## 2018-09-17 DIAGNOSIS — E11.29 TYPE 2 DIABETES MELLITUS WITH MICROALBUMINURIA, WITHOUT LONG-TERM CURRENT USE OF INSULIN (H): ICD-10-CM

## 2018-09-19 RX ORDER — LIRAGLUTIDE 6 MG/ML
INJECTION SUBCUTANEOUS
Refills: 1 | OUTPATIENT
Start: 2018-09-19

## 2018-10-04 DIAGNOSIS — R80.9 TYPE 2 DIABETES MELLITUS WITH MICROALBUMINURIA, WITHOUT LONG-TERM CURRENT USE OF INSULIN (H): ICD-10-CM

## 2018-10-04 DIAGNOSIS — E11.29 TYPE 2 DIABETES MELLITUS WITH MICROALBUMINURIA, WITHOUT LONG-TERM CURRENT USE OF INSULIN (H): ICD-10-CM

## 2018-10-04 RX ORDER — LIRAGLUTIDE 6 MG/ML
INJECTION SUBCUTANEOUS
Qty: 18 ML | Refills: 1 | Status: SHIPPED | OUTPATIENT
Start: 2018-10-04 | End: 2019-04-30

## 2018-10-25 DIAGNOSIS — E78.2 MIXED HYPERLIPIDEMIA: ICD-10-CM

## 2018-10-25 DIAGNOSIS — E11.9 TYPE 2 DIABETES MELLITUS WITHOUT COMPLICATION, WITHOUT LONG-TERM CURRENT USE OF INSULIN (H): ICD-10-CM

## 2018-10-25 RX ORDER — ROSUVASTATIN CALCIUM 5 MG/1
TABLET, COATED ORAL
Qty: 90 TABLET | Refills: 3 | Status: SHIPPED | OUTPATIENT
Start: 2018-10-25 | End: 2019-04-30

## 2018-10-27 DIAGNOSIS — E11.29 TYPE 2 DIABETES MELLITUS WITH MICROALBUMINURIA, WITHOUT LONG-TERM CURRENT USE OF INSULIN (H): ICD-10-CM

## 2018-10-27 DIAGNOSIS — I10 BENIGN ESSENTIAL HYPERTENSION: ICD-10-CM

## 2018-10-27 DIAGNOSIS — R80.9 TYPE 2 DIABETES MELLITUS WITH MICROALBUMINURIA, WITHOUT LONG-TERM CURRENT USE OF INSULIN (H): ICD-10-CM

## 2018-10-29 RX ORDER — LISINOPRIL AND HYDROCHLOROTHIAZIDE 20; 25 MG/1; MG/1
TABLET ORAL
Qty: 90 TABLET | Refills: 1 | Status: SHIPPED | OUTPATIENT
Start: 2018-10-29 | End: 2019-04-26

## 2018-10-29 RX ORDER — METFORMIN HCL 500 MG
TABLET, EXTENDED RELEASE 24 HR ORAL
Qty: 360 TABLET | Refills: 1 | Status: SHIPPED | OUTPATIENT
Start: 2018-10-29 | End: 2019-04-26

## 2018-11-08 ENCOUNTER — TELEPHONE (OUTPATIENT)
Dept: FAMILY MEDICINE | Facility: CLINIC | Age: 58
End: 2018-11-08

## 2018-11-08 DIAGNOSIS — R80.9 TYPE 2 DIABETES MELLITUS WITH MICROALBUMINURIA, WITHOUT LONG-TERM CURRENT USE OF INSULIN (H): ICD-10-CM

## 2018-11-08 DIAGNOSIS — E11.29 TYPE 2 DIABETES MELLITUS WITH MICROALBUMINURIA, WITHOUT LONG-TERM CURRENT USE OF INSULIN (H): ICD-10-CM

## 2018-11-08 RX ORDER — LIRAGLUTIDE 6 MG/ML
1.8 INJECTION SUBCUTANEOUS DAILY
Qty: 18 ML | Refills: 1 | Status: SHIPPED | OUTPATIENT
Start: 2018-11-08 | End: 2019-06-12

## 2018-11-08 NOTE — TELEPHONE ENCOUNTER
Pharmacy calling to clarify the dosage for victoza patient stated this has been increased to 1.8 but script states 1.2    Please call to clarify    Thank you

## 2018-11-12 ENCOUNTER — ALLIED HEALTH/NURSE VISIT (OUTPATIENT)
Dept: EDUCATION SERVICES | Facility: CLINIC | Age: 58
End: 2018-11-12
Payer: COMMERCIAL

## 2018-11-12 VITALS — BODY MASS INDEX: 29.33 KG/M2 | WEIGHT: 179 LBS

## 2018-11-12 DIAGNOSIS — R80.9 TYPE 2 DIABETES MELLITUS WITH MICROALBUMINURIA, WITHOUT LONG-TERM CURRENT USE OF INSULIN (H): Primary | ICD-10-CM

## 2018-11-12 DIAGNOSIS — E11.29 TYPE 2 DIABETES MELLITUS WITH MICROALBUMINURIA, WITHOUT LONG-TERM CURRENT USE OF INSULIN (H): Primary | ICD-10-CM

## 2018-11-12 NOTE — PROGRESS NOTES
"Diabetes Self-Management Education & Support    Diabetes Education Self Management & Training    SUBJECTIVE/OBJECTIVE:  Presents for: Individual review  Accompanied by: Self  Diabetes education in the past 24mo: Yes  Focus of Visit: Healthy Eating, Being Active  Diabetes type: Type 2  Disease course: Worsening  How confident are you filling out medical forms by yourself:: Not Assessed  Diabetes management related comments/concerns: Amarilis mentions that she is working on decreasing her A1C to below 8.1 so she can get a $75 medication credit.  She has been working on portion control and limiting the amount of stress/emotional eating.   Transportation concerns: No  Other concerns:: None  Cultural Influences/Ethnic Background:  American    Diabetes Symptoms & Complications  Blurred vision: No  Fatigue: No  Neuropathy: Yes  Foot ulcerations: No  Polydipsia: No  Polyphagia: No  Polyuria: No  Visual change: No  Weakness: No  Weight loss: No  Slow healing wounds: No  Weight trend: Stable  Autonomic neuropathy: No  CVA: No  Heart disease: Yes  Nephropathy: No  Peripheral neuropathy: Yes  Peripheral Vascular Disease: No  Retinopathy: No  Sexual dysfunction: No    Patient Problem List and Family Medical History reviewed for relevant medical history, current medical status, and diabetes risk factors.    Vitals:  Wt 81.2 kg (179 lb)  BMI 29.33 kg/m2  Estimated body mass index is 29.33 kg/(m^2) as calculated from the following:    Height as of 2/26/18: 1.664 m (5' 5.5\").    Weight as of this encounter: 81.2 kg (179 lb).   Last 3 BP:   BP Readings from Last 3 Encounters:   09/04/18 136/82   04/23/18 136/84   02/26/18 132/80       History   Smoking Status     Former Smoker     Packs/day: 1.00     Years: 20.00     Types: Cigarettes     Start date: 1/1/1976     Quit date: 1/1/2005   Smokeless Tobacco     Never Used     Comment: quit 8-9 years ago       Labs:  Lab Results   Component Value Date    A1C 8.1 09/04/2018     Lab Results "   Component Value Date     2018     Lab Results   Component Value Date    LDL 59 2018     HDL Cholesterol   Date Value Ref Range Status   2018 41 (L) >49 mg/dL Final   ]  GFR Estimate   Date Value Ref Range Status   2018 >90 >60 mL/min/1.7m2 Final     Comment:     Non  GFR Calc     GFR Estimate If Black   Date Value Ref Range Status   2018 >90 >60 mL/min/1.7m2 Final     Comment:      GFR Calc     Lab Results   Component Value Date    CR 0.61 2018     No results found for: MICROALBUMIN    Healthy Eating  Healthy Eating Assessed Today: Yes  Cultural/Protestant diet restrictions?: No  Patient on a regular basis: Eats 3 meals a day, Has an inconsistent intake of carbohydrates  Meal planning: Smaller portions  Meals include: Breakfast, Lunch, Dinner  Breakfast: Rebel Dyan Mocha drink OR cinnamon Raisin bread -2 pieces with a whole apple or with banana OR eggs with nick, cheese and hashbrowns OR organic cheerios with raisins and milk  Lunch: 1/2 roast beef sandwich with mustard with nick cheddar bread OR tuna with owens  Dinner: 1/2 roast beed sandwich with mustard with nick cheddar bread OR steak with broccoli   Snacks: candy bar (once or twice/week) OR 1/3 cup of ice cream OR Soria vanilla ice cream with Belen Syrup OR cookies   Beverages: Water, Tea, Coffee  Has patient met with a dietitian in the past?: Yes    Being Active  Being Active Assessed Today: Yes  Exercise:: Currently not exercising (Jyoti is active with her animals at home- she has horses, chickens, dogs and cats that  she feeds every morning. )  Barrier to exercise: None    Monitoring  Monitoring Assessed Today: Yes  Did patient bring glucose meter to appointment? : Yes  Blood Glucose Meter: Reli-On  Home Glucose (Sugar) Monitorin-2 times per day  Blood glucose trend: No change  Low Glucose Range (mg/dL): 110-130  High Glucose Range (mg/dL): 180-200  Overall Range (mg/dL):  "130-140    153 this morning before breakfast  145 mg/dl 14 day average  164 mg/dl 30 day average      Taking Medications  Diabetes Medication(s)     Biguanides Sig    metFORMIN (GLUCOPHAGE-XR) 500 MG 24 hr tablet TAKE 2 TABLETS BY MOUTH TWICE A DAY    metFORMIN (GLUCOPHAGE-XR) 500 MG 24 hr tablet TAKE 2 TABLETS BY MOUTH TWICE A DAY    Incretin Mimetic Agents (GLP-1 Receptor Agonists) Sig    liraglutide (VICTOZA) 18 MG/3ML soln Inject 1.8 mg Subcutaneous daily    VICTOZA PEN 18 MG/3ML soln INJECT 1.2 MG SUBCUTANEOUS DAILY          Taking Medication Assessed Today: Yes  Current Treatments: Diet, Non-insulin Injectables, Oral Agent (dual therapy)  Problems taking diabetes medications regularly?: No  Diabetes medication side effects?: No    Problem Solving  Problem Solving Assessed Today: No  Hypoglycemia Frequency: Never  Patient carries a carbohydrate source: No  Medical alert: No  Severe weather/disaster plan for diabetes management?: No  DKA prevention plan?: No  Sick day plan for diabetes management?: (P) No    Reducing Risks  Reducing Risks Assessed Today: No  CAD Risks: Family history, Hypertension, Obesity, Sedentary lifestyle, Stress  Has dilated eye exam at least once a year?: Yes  Sees dentist every 6 months?: Yes  Sees podiatrist (foot doctor)?: No    Healthy Coping  Healthy Coping Assessed Today: No  Informal Support system:: [place] based, Family, Spouse  Difficulty affording diabetes management supplies?: Yes  Patient Activation Measure Survey Score:  JYOTI Score (Last Two) 8/13/2013 2/6/2014   JYOTI Raw Score 38 46   Activation Score 52.9 75.3   JYOTI Level 2 4       ASSESSMENT:  Amarilis has been working hard on her diet.  She mentions that she used to be a really good cook and enjoy cooking, however she has not cooked for a while because the things that she likes to cook she \"can't have anymore.\"  Jyoti feels like she has made a lot of progress with her diet and feels that her relationship with food is improving.  "     Goals Addressed as of 11/12/2018 at 1:13 PM        Patient Stated      Healthy Eating (pt-stated)     Added 11/12/18 by Libia Schroeder RD           My Goal: I will track my foods on My Fitness Pal every day    What I need to meet my goal: Use the adam more often     I plan to meet my goal by this date: Next Diabetes Education Visit              Patient's most recent   Lab Results   Component Value Date    A1C 8.1 09/04/2018    is not meeting goal of <8.0    INTERVENTION:   Discussion:  We discussed Jyoti's weight loss and A1C goals.  Discussed weight management and what has worked for her in the past.  She mentions that she likes to meet with different dietitians to get different ideas on weight loss.  She feels like her body is failing her and is frustrated about this because she doesn't think that it will ever improve.  Jyoti has been trying to eat smaller portions and has been working on emotional eating.  Jyoti has just recently started writing down the foods that she is eating and her daily BG and BP.  Today we discussed tracking intake using My Fitness Pal to learn more about her diet.  Patient doesn't carbohydrate count and isn't even sure how many carbohydrates she should be eating.  She plans to start adding her foods to My Fitness Pal to learn about calories and carbohydrates in her foods.  She recently started drinking a Rebel Mocha drink for breakfast and wondering if this is healthy.     Diabetes knowledge and skills assessment:     Patient is knowledgeable in diabetes management concepts related to: Healthy Eating, Monitoring, Taking Medication, Problem Solving and Reducing Risks    Patient needs further education on the following diabetes management concepts: Healthy Eating, Being Active, Reducing Risks and Healthy Coping    Based on learning assessment above, most appropriate setting for further diabetes education would be: Individual setting.    Education provided today on:  AADE Self-Care  Behaviors:  Healthy Eating: weight reduction  Being Active: describe appropriate activity program and encouraged physical activity    Opportunities for ongoing education and support in diabetes-self management were discussed.    Pt verbalized understanding of concepts discussed and recommendations provided today.       Education Materials Provided:  No new materials provided today    PLAN:  See Patient Instructions for co-developed, patient-stated behavior change goals.  AVS printed and provided to patient today. See Follow-Up section for recommended follow-up.    Libia Schroeder RD, LD, CDE  Time Spent: 60 minutes  Encounter Type: Individual    Any diabetes medication dose changes were made via the CDE Protocol and Collaborative Practice Agreement with the patient's referring provider. A copy of this encounter was shared with the provider.

## 2018-11-12 NOTE — MR AVS SNAPSHOT
After Visit Summary   11/12/2018    Amarilis Vazquez    MRN: [id number]           Patient Information     Date Of Birth          1960        Visit Information        Provider Department      11/12/2018 9:30 AM CP DIABETIC ED RESOURCE [facility] [place]        Care Instructions    Diabetes Support Resources:  Websites: American Association of Diabetes Educators Participant Resources: www.diabeteseducator.org/living-with-diabetes   American Diabetes Association: www.diabetes.org  Diabetes Together 2 Goal: http://ffgffyis3tmnt.org  Exercise: Community Education classes - check local school district websites/mailings     Bring blood glucose meter and logbook with you to all doctor and follow-up appointments.     [facility] for the [facility]:  For Your Diabetes Education and Nutrition Appointments Call:  [phone]   For Diabetes Education or Nutrition Related Questions:   Phone: [phone]  E-mail: [email or web address]  Fax: [phone]   If you need a medication refill please contact your pharmacy. Please allow 3 business days for your refills to be completed.    Instructions for emailing the Diabetes Educators    If you need to communicate a non-urgent message to a Diabetes Educator via email, please send to [email or web address].    Please follow the following email guidelines:    Subject line: Secure: your clinic name (example: Secure: [facility])  In the email please include: First name, middle initial, last name and date of birth.    We will be in touch with you within one (1) business day.             Follow-ups after your visit        Your next 10 appointments already scheduled     Nov 26, 2018  9:30 AM CST   Diabetes Education with CP DIABETIC ED RESOURCE   [facility] ([facility])    [address] [address]    [phone]            Dec 10, 2018  9:30 AM CST   Diabetes Education with CP DIABETIC ED RESOURCE   [place] ([facility]    [address] [address] [phone]            Mar 12, 2019  8:00 AM CDT   Office Visit with Kristen M Kehr, PA-C   [facility]    [address] [facility] [address] [phone]           Bring a current list of meds and any records pertaining to this visit. For Physicals, please bring immunization records and any forms needing to be filled out. Please arrive 10 minutes early to complete paperwork.              Who to contact     If you have questions or need follow up information about today's clinic visit or your schedule please contact [facility] directly at [phone].  Normal or non-critical lab and imaging results will be communicated to you by Par8ohart, letter or phone within 4 business days after the clinic has received the results. If you do not hear from us within 7 days, please contact the clinic through Par8ohart or phone. If you have a critical or abnormal lab result, we will notify you by phone as soon as possible.  Submit refill requests through [employer] or call your pharmacy and they will forward the refill request to us. Please allow 3 business days for your refill to be completed.          Additional Information About Your Visit        Par8ohart Information     [employer] gives you secure access to your electronic health record. If you see a primary care provider, you can also send messages to your care team and make appointments. If you have questions, please call your primary care clinic.  If you do not have a primary care provider, please call [phone] and they will assist you.        Care EveryWhere ID     This is your Care EveryWhere ID. This could be used by other organizations to  access your [place] medical records  [id number]        Your Vitals Were     BMI (Body Mass Index)                   29.33 kg/m2            Blood Pressure from Last 3 Encounters:   09/04/18 136/82   04/23/18 136/84   02/26/18 132/80    Weight from Last 3 Encounters:   11/12/18 81.2 kg (179 lb)   09/04/18 82.1 kg (181 lb)   04/23/18 85.3 kg (188 lb)              Today, you had the following     No orders found for display       Primary Care Provider Office Phone # Fax #    Kristen M Kehr, PA-C [phone]       [address] [facility] [address]        Goals        General    Healthy Eating (pt-stated)     Notes - Note created  11/12/2018 10:25 AM by Libia Schroeder RD    My Goal: I will track my foods on My Fitness Pal every day    What I need to meet my goal: Use the adam more often     I plan to meet my goal by this date: Next Diabetes Education Visit          Equal Access to Services     BELLA SUNG : Hadii taiwo aden hadasho Soomaali, waaxda luqadaha, qaybta kaalmada adeegyada, hansa andino . So [facility] [phone].    ATENCIÓN: Si habla español, tiene a stroud disposición servicios gratuitos de asistencia lingüística. Llame al [phone].    We comply with applicable federal civil rights laws and [place] laws. We do not discriminate on the basis of race, color, national origin, age, disability, sex, sexual orientation, or gender identity.            Thank you!     Thank you for choosing [place] [facility]  for your care. Our goal is always to provide you with excellent care. Hearing back from our patients is one way we can continue to improve our services. Please take a few minutes to complete the written survey that you may receive in the mail after your visit with us. Thank you!             Your Updated Medication List - Protect others around you: Learn how to safely use, store and throw away your medicines at www.disposemymeds.org.           This list is accurate as of 11/12/18 10:27 AM.  Always use your most recent med list.                   Brand Name Dispense Instructions for use Diagnosis    Alpha Lipoic Acid 200 MG Caps           aspirin 81 MG tablet      Take 1 tablet by mouth daily.        B COMPLEX PO      Super B complex        CO Q 10 PO      Take  by mouth.        COMPLETE MULTIVITAMIN/MINERAL PO      Take  by mouth.        cyanocobalamin 1000 MCG Subl sublingual tablet      Place 1,000 mcg under the tongue daily        Fish Oil Oil      1,400 mg        flaxseed oil 1000 MG Caps      Take  by mouth.        insulin pen needle 32G X 4 MM     100 each    Use 1 pen needles daily or as directed.    Type 2 diabetes mellitus without complication, without long-term current use of insulin (H)       levothyroxine 100 MCG tablet    SYNTHROID/LEVOTHROID    90 tablet    TAKE 1 TABLET (100 MCG) BY MOUTH DAILY    Hypothyroidism, unspecified type       * lisinopril-hydrochlorothiazide 20-25 MG per tablet    PRINZIDE/ZESTORETIC    90 tablet    Take 1 tablet by mouth daily    Benign essential hypertension, Type 2 diabetes mellitus with microalbuminuria, without long-term current use of insulin (H)       * lisinopril-hydrochlorothiazide 20-25 MG per tablet    PRINZIDE/ZESTORETIC    90 tablet    TAKE 1 TABLET BY MOUTH EVERY DAY    Benign essential hypertension, Type 2 diabetes mellitus with microalbuminuria, without long-term current use of insulin (H)       Magnesium 400 MG Tabs           * metFORMIN 500 MG 24 hr tablet    GLUCOPHAGE-XR    360 tablet    TAKE 2 TABLETS BY MOUTH TWICE A DAY    Type 2 diabetes mellitus with microalbuminuria, without long-term current use of insulin (H)       * metFORMIN 500 MG 24 hr tablet    GLUCOPHAGE-XR    360 tablet    TAKE 2 TABLETS BY MOUTH TWICE A DAY    Type 2 diabetes mellitus with microalbuminuria, without long-term current use of insulin (H)       * rosuvastatin 5 MG tablet    CRESTOR    90 tablet    Take 1 tablet  (5 mg) by mouth daily    Mixed hyperlipidemia, Coronary artery disease involving native coronary artery of native heart without angina pectoris       * rosuvastatin 5 MG tablet    CRESTOR    90 tablet    TAKE 1 TABLET BY MOUTH EVERY DAY    Mixed hyperlipidemia, Type 2 diabetes mellitus without complication, without long-term current use of insulin (H)       * VICTOZA PEN 18 MG/3ML soln   Generic drug:  liraglutide     18 mL    INJECT 1.2 MG SUBCUTANEOUS DAILY    Type 2 diabetes mellitus with microalbuminuria, without long-term current use of insulin (H)       * liraglutide 18 MG/3ML soln    VICTOZA    18 mL    Inject 1.8 mg Subcutaneous daily    Type 2 diabetes mellitus with microalbuminuria, without long-term current use of insulin (H)       * Notice:  This list has 8 medication(s) that are the same as other medications prescribed for you. Read the directions carefully, and ask your doctor or other care provider to review them with you.

## 2018-11-12 NOTE — PATIENT INSTRUCTIONS
Diabetes Support Resources:  Websites: American Association of Diabetes Educators Participant Resources: www.diabeteseducator.org/living-with-diabetes   American Diabetes Association: www.diabetes.org  Diabetes Together 2 Goal: http://jxbgrlzl3pudx.org  Exercise: Community Education classes - check local school district websites/mailings     Bring blood glucose meter and logbook with you to all doctor and follow-up appointments.     [place] [facility] for the [facility]:  For Your Diabetes Education and Nutrition Appointments Call:  [phone]   For Diabetes Education or Nutrition Related Questions:   Phone: [phone]  E-mail: [email or web address]  Fax: [phone]   If you need a medication refill please contact your pharmacy. Please allow 3 business days for your refills to be completed.    Instructions for emailing the Diabetes Educators    If you need to communicate a non-urgent message to a Diabetes Educator via email, please send to [email or web address].    Please follow the following email guidelines:    Subject line: Secure: your clinic name (example: Secure: [facility])  In the email please include: First name, middle initial, last name and date of birth.    We will be in touch with you within one (1) business day.

## 2018-11-26 ENCOUNTER — ALLIED HEALTH/NURSE VISIT (OUTPATIENT)
Dept: EDUCATION SERVICES | Facility: CLINIC | Age: 58
End: 2018-11-26
Payer: COMMERCIAL

## 2018-11-26 VITALS — BODY MASS INDEX: 29.09 KG/M2 | WEIGHT: 177.5 LBS

## 2018-11-26 DIAGNOSIS — E11.29 TYPE 2 DIABETES MELLITUS WITH MICROALBUMINURIA, WITHOUT LONG-TERM CURRENT USE OF INSULIN (H): Primary | ICD-10-CM

## 2018-11-26 DIAGNOSIS — R80.9 TYPE 2 DIABETES MELLITUS WITH MICROALBUMINURIA, WITHOUT LONG-TERM CURRENT USE OF INSULIN (H): Primary | ICD-10-CM

## 2018-11-26 PROCEDURE — G0108 DIAB MANAGE TRN  PER INDIV: HCPCS

## 2018-11-26 NOTE — PATIENT INSTRUCTIONS
Diabetes Support Resources:Bring blood glucose meter and logbook with you to all doctor and follow-up appointments.     [place] [facility] for the [facility]:  For Your Diabetes Education and Nutrition Appointments Call:  [phone]   For Diabetes Education or Nutrition Related Questions:   Phone: [phone]  E-mail: [email or web address]  Fax: [phone]   If you need a medication refill please contact your pharmacy. Please allow 3 business days for your refills to be completed.    Instructions for emailing the Diabetes Educators    If you need to communicate a non-urgent message to a Diabetes Educator via email, please send to [email or web address].    Please follow the following email guidelines:    Subject line: Secure: your clinic name (example: Secure: [facility])  In the email please include: First name, middle initial, last name and date of birth.    We will be in touch with you within one (1) business day.

## 2018-11-26 NOTE — MR AVS SNAPSHOT
After Visit Summary   11/26/2018    Amarilis Vazquez    MRN: [id number]           Patient Information     Date Of Birth          1960        Visit Information        Provider Department      11/26/2018 9:30 AM CP DIABETIC ED RESOURCE [facility] [place]        Care Instructions    Diabetes Support Resources:Bring blood glucose meter and logbook with you to all doctor and follow-up appointments.     [facility] for the [facility]:  For Your Diabetes Education and Nutrition Appointments Call:  [phone]   For Diabetes Education or Nutrition Related Questions:   Phone: [phone]  E-mail: [email or web address]  Fax: [phone]   If you need a medication refill please contact your pharmacy. Please allow 3 business days for your refills to be completed.    Instructions for emailing the Diabetes Educators    If you need to communicate a non-urgent message to a Diabetes Educator via email, please send to [email or web address].    Please follow the following email guidelines:    Subject line: Secure: your clinic name (example: Secure: [facility])  In the email please include: First name, middle initial, last name and date of birth.    We will be in touch with you within one (1) business day.             Follow-ups after your visit        Your next 10 appointments already scheduled     Dec 10, 2018  9:30 AM CST   Diabetes Education with CP DIABETIC ED RESOURCE   [facility] ([facility])    [address] [address]   [phone]            Jan 14, 2019  9:30 AM CST   Diabetes Education with CP DIABETIC ED RESOURCE   [facility] ([facility])    [address] [address]   [phone]            Mar 12, 2019  8:00 AM CDT   Office Visit with Amanda  M Kehr, PA-C   [facility]    [address] [facility] [address] [phone]           Bring a current list of meds and any records pertaining to this visit. For Physicals, please bring immunization records and any forms needing to be filled out. Please arrive 10 minutes early to complete paperwork.              Who to contact     If you have questions or need follow up information about today's clinic visit or your schedule please contact [place] [facility] directly at [phone].  Normal or non-critical lab and imaging results will be communicated to you by db4objectshart, letter or phone within 4 business days after the clinic has received the results. If you do not hear from us within 7 days, please contact the clinic through [employer] or phone. If you have a critical or abnormal lab result, we will notify you by phone as soon as possible.  Submit refill requests through [employer] or call your pharmacy and they will forward the refill request to us. Please allow 3 business days for your refill to be completed.          Additional Information About Your Visit        MyChart Information     [employer] gives you secure access to your electronic health record. If you see a primary care provider, you can also send messages to your care team and make appointments. If you have questions, please call your primary care clinic.  If you do not have a primary care provider, please call [phone] and they will assist you.        Care EveryWhere ID     This is your Care EveryWhere ID. This could be used by other organizations to access your [place] medical records  [id number]        Your Vitals Were     BMI (Body Mass Index)                   29.09 kg/m2            Blood Pressure from Last 3 Encounters:   09/04/18 136/82   04/23/18 136/84   02/26/18 132/80    Weight from Last 3 Encounters:   11/26/18 80.5 kg (177 lb 8 oz)   11/12/18 81.2 kg (179 lb)    09/04/18 82.1 kg (181 lb)              Today, you had the following     No orders found for display       Primary Care Provider Office Phone # Fax #    Kristen M Kehr, PA-C [phone] [address] [facility] [address]        Goals        General    Being Active (pt-stated)     Notes - Note created  11/26/2018 10:12 AM by Libia Schroeder RD    My Goal: I will be active 2 times/week     What I need to meet my goal: Get back to An[employer] Activity    I plan to meet my goal by this date: Next diabetes education visit - December 10th        Healthy Eating (pt-stated)     Notes - Note created  11/12/2018 10:25 AM by Libia Schroeder RD    My Goal: I will track my foods on My Fitness Pal every day    What I need to meet my goal: Use the adam more often     I plan to meet my goal by this date: Next Diabetes Education Visit        Healthy Eating (pt-stated)     Notes - Note created  11/26/2018 10:12 AM by Libia Schroeder RD    My Goal: I will eat vegetables 4 days/week    What I need to meet my goal: bring vegetables with you to work    I plan to meet my goal by this date: Next diabetes education visit - December 10th          Equal Access to Services     BELLA SUNG : Hadii taiwo ku hadasho Soomaali, waaxda luqadaha, qaybta kaalmada adeegyada, waxay juvenal haysheryl andino . So [facility] [phone].    ATENCIÓN: Si habla español, tiene a stroud disposición servicios gratuitos de asistencia lingüística. Llame al [phone].    We comply with applicable federal civil rights laws and [place] laws. We do not discriminate on the basis of race, color, national origin, age, disability, sex, sexual orientation, or gender identity.            Thank you!     Thank you for choosing [place] [facility]  for your care. Our goal is always to provide you with excellent care. Hearing back from our patients is one way we can continue to improve our services. Please take a  few minutes to complete the written survey that you may receive in the mail after your visit with us. Thank you!             Your Updated Medication List - Protect others around you: Learn how to safely use, store and throw away your medicines at www.disposemymeds.org.          This list is accurate as of 11/26/18 10:13 AM.  Always use your most recent med list.                   Brand Name Dispense Instructions for use Diagnosis    Alpha Lipoic Acid 200 MG Caps           aspirin 81 MG tablet    ASA     Take 1 tablet by mouth daily.        B COMPLEX PO      Super B complex        CO Q 10 PO      Take  by mouth.        COMPLETE MULTIVITAMIN/MINERAL PO      Take  by mouth.        cyanocobalamin 1000 MCG Subl sublingual tablet      Place 1,000 mcg under the tongue daily        Fish Oil Oil      1,400 mg        flaxseed oil 1000 MG Caps      Take  by mouth.        insulin pen needle 32G X 4 MM miscellaneous    32G X 4 MM    100 each    Use 1 pen needles daily or as directed.    Type 2 diabetes mellitus without complication, without long-term current use of insulin (H)       levothyroxine 100 MCG tablet    SYNTHROID/LEVOTHROID    90 tablet    TAKE 1 TABLET (100 MCG) BY MOUTH DAILY    Hypothyroidism, unspecified type       * lisinopril-hydrochlorothiazide 20-25 MG per tablet    PRINZIDE/ZESTORETIC    90 tablet    Take 1 tablet by mouth daily    Benign essential hypertension, Type 2 diabetes mellitus with microalbuminuria, without long-term current use of insulin (H)       * lisinopril-hydrochlorothiazide 20-25 MG per tablet    PRINZIDE/ZESTORETIC    90 tablet    TAKE 1 TABLET BY MOUTH EVERY DAY    Benign essential hypertension, Type 2 diabetes mellitus with microalbuminuria, without long-term current use of insulin (H)       Magnesium 400 MG Tabs           * metFORMIN 500 MG 24 hr tablet    GLUCOPHAGE-XR    360 tablet    TAKE 2 TABLETS BY MOUTH TWICE A DAY    Type 2 diabetes mellitus with microalbuminuria, without  long-term current use of insulin (H)       * metFORMIN 500 MG 24 hr tablet    GLUCOPHAGE-XR    360 tablet    TAKE 2 TABLETS BY MOUTH TWICE A DAY    Type 2 diabetes mellitus with microalbuminuria, without long-term current use of insulin (H)       * rosuvastatin 5 MG tablet    CRESTOR    90 tablet    Take 1 tablet (5 mg) by mouth daily    Mixed hyperlipidemia, Coronary artery disease involving native coronary artery of native heart without angina pectoris       * rosuvastatin 5 MG tablet    CRESTOR    90 tablet    TAKE 1 TABLET BY MOUTH EVERY DAY    Mixed hyperlipidemia, Type 2 diabetes mellitus without complication, without long-term current use of insulin (H)       * VICTOZA PEN 18 MG/3ML solution   Generic drug:  liraglutide     18 mL    INJECT 1.2 MG SUBCUTANEOUS DAILY    Type 2 diabetes mellitus with microalbuminuria, without long-term current use of insulin (H)       * liraglutide 18 MG/3ML solution    VICTOZA    18 mL    Inject 1.8 mg Subcutaneous daily    Type 2 diabetes mellitus with microalbuminuria, without long-term current use of insulin (H)       * Notice:  This list has 8 medication(s) that are the same as other medications prescribed for you. Read the directions carefully, and ask your doctor or other care provider to review them with you.

## 2018-11-26 NOTE — PROGRESS NOTES
"Diabetes Self-Management Education & Support    Diabetes Education Self Management & Training    SUBJECTIVE/OBJECTIVE:  Presents for: Follow-up  Accompanied by: Self  Diabetes education in the past 24mo: Yes  Focus of Visit: Healthy Eating, Being Active  Diabetes type: Type 2  Disease course: Worsening  How confident are you filling out medical forms by yourself:: Not Assessed  Diabetes management related comments/concerns: Amarilis states that she has been tracking her food but hasn't been paying much attention to her calories or carbohydrates.  She mentions that her BG have been running around 170-180 in the morning.   Transportation concerns: No  Other concerns:: None  Cultural Influences/Ethnic Background:  American    Diabetes Symptoms & Complications  Blurred vision: No  Fatigue: No  Neuropathy: Yes  Foot ulcerations: No  Polydipsia: No  Polyphagia: No  Polyuria: No  Visual change: No  Weakness: No  Weight loss: No  Slow healing wounds: No  Weight trend: Stable  Autonomic neuropathy: No  CVA: No  Heart disease: Yes  Nephropathy: No  Peripheral neuropathy: Yes  Peripheral Vascular Disease: No  Retinopathy: No  Sexual dysfunction: No    Patient Problem List and Family Medical History reviewed for relevant medical history, current medical status, and diabetes risk factors.    Vitals:  Wt 80.5 kg (177 lb 8 oz)  BMI 29.09 kg/m2  Estimated body mass index is 29.09 kg/(m^2) as calculated from the following:    Height as of 2/26/18: 1.664 m (5' 5.5\").    Weight as of this encounter: 80.5 kg (177 lb 8 oz).   Last 3 BP:   BP Readings from Last 3 Encounters:   09/04/18 136/82   04/23/18 136/84   02/26/18 132/80       History   Smoking Status     Former Smoker     Packs/day: 1.00     Years: 20.00     Types: Cigarettes     Start date: 1/1/1976     Quit date: 1/1/2005   Smokeless Tobacco     Never Used     Comment: quit 8-9 years ago       Labs:  Lab Results   Component Value Date    A1C 8.1 09/04/2018     Lab Results "   Component Value Date     2018     Lab Results   Component Value Date    LDL 59 2018     HDL Cholesterol   Date Value Ref Range Status   2018 41 (L) >49 mg/dL Final   ]  GFR Estimate   Date Value Ref Range Status   2018 >90 >60 mL/min/1.7m2 Final     Comment:     Non  GFR Calc     GFR Estimate If Black   Date Value Ref Range Status   2018 >90 >60 mL/min/1.7m2 Final     Comment:      GFR Calc     Lab Results   Component Value Date    CR 0.61 2018     No results found for: MICROALBUMIN    Healthy Eating  Healthy Eating Assessed Today: Yes  Cultural/Advent diet restrictions?: No  Patient on a regular basis: Eats 3 meals a day  Meal planning: Smaller portions  Meals include: Breakfast, Lunch, Dinner  Breakfast: Rebel Dyan Mocha drink OR cinnamon Raisin bread -2 pieces with a whole apple or with banana OR eggs with nick, cheese and hashbrowns OR organic cheerios with raisins and milk  Lunch: 1/2 roast beef sandwich with mustard with nick cheddar bread OR tuna with owens  Dinner: 1/2 roast beed sandwich with mustard with nick cheddar bread OR steak with broccoli   Snacks: candy bar (once or twice/week) OR 1/3 cup of ice cream OR [place] vanilla ice cream with [place] Syrup OR cookies   Beverages: Water, Tea, Coffee  Has patient met with a dietitian in the past?: Yes    Being Active  Being Active Assessed Today: Yes  Exercise:: Currently not exercising (Jyoti is active with her animals at home- she has horses, chickens, dogs and cats that  she feeds every morning. )  Barrier to exercise: None    Monitoring  Monitoring Assessed Today: Yes  Did patient bring glucose meter to appointment? : Yes  Blood Glucose Meter: Reli-On  Home Glucose (Sugar) Monitorin-2 times per day  Blood glucose trend: No change  Low Glucose Range (mg/dL): 110-130  High Glucose Range (mg/dL): 180-200  Overall Range (mg/dL): 140-180    Amarilis is testing her blood sugars  in the mornings, they have been running 170-180, some have been around 150.  She mentions that she sometimes gets down to 130's but not often.    Taking Medications  Diabetes Medication(s)     Biguanides Sig    metFORMIN (GLUCOPHAGE-XR) 500 MG 24 hr tablet TAKE 2 TABLETS BY MOUTH TWICE A DAY    metFORMIN (GLUCOPHAGE-XR) 500 MG 24 hr tablet TAKE 2 TABLETS BY MOUTH TWICE A DAY    Incretin Mimetic Agents (GLP-1 Receptor Agonists) Sig    liraglutide (VICTOZA) 18 MG/3ML soln Inject 1.8 mg Subcutaneous daily    VICTOZA PEN 18 MG/3ML soln INJECT 1.2 MG SUBCUTANEOUS DAILY          Taking Medication Assessed Today: Yes  Current Treatments: Diet, Non-insulin Injectables, Oral Agent (dual therapy)  Problems taking diabetes medications regularly?: No  Diabetes medication side effects?: No  Treatment Compliance: All of the time    Problem Solving  Problem Solving Assessed Today: No  Hypoglycemia Frequency: Never  Patient carries a carbohydrate source: No  Medical alert: No  Severe weather/disaster plan for diabetes management?: No  DKA prevention plan?: No    Reducing Risks  Reducing Risks Assessed Today: No  CAD Risks: Family history, Hypertension, Obesity, Sedentary lifestyle, Stress  Has dilated eye exam at least once a year?: Yes  Sees dentist every 6 months?: Yes  Sees podiatrist (foot doctor)?: No    Healthy Coping  Healthy Coping Assessed Today: No  Informal Support system:: [place] based, Family, Spouse  Difficulty affording diabetes management supplies?: Yes  Patient Activation Measure Survey Score:  DREW Score (Last Two) 8/13/2013 2/6/2014   DREW Raw Score 38 46   Activation Score 52.9 75.3   DREW Level 2 4       ASSESSMENT:  Amarilis's fasting blood sugars remain elevated, 170-180's. She has been working on eating smaller portions and emotional eating.  Amarilis is very frustrated with her body and mentions several times that she is taking way to many medications.  She has not been exercising, however she does have a fitness  center membership. She has started tracking her intake on My Fitness Pal and it does look like she has been staying on track with calories and carbohydrates, however her fat intake is quite high.     Goals Addressed as of 11/26/2018 at 11:03 AM             Today       Patient Stated      Being Active (pt-stated)   0%    Added 11/26/18 by Libia Schroeder RD             My Goal: I will be active 2 times/week     What I need to meet my goal: Get back to An[employer] Activity    I plan to meet my goal by this date: Next diabetes education visit - December 10th          Healthy Eating (pt-stated)   70%    Added 11/12/18 by Libia Schroeder RD             My Goal: I will track my foods on My Fitness Pal every day    What I need to meet my goal: Use the adam more often     I plan to meet my goal by this date: Next Diabetes Education Visit          Healthy Eating (pt-stated)   0%    Added 11/26/18 by Libia Schroeder RD             My Goal: I will eat vegetables 4 days/week    What I need to meet my goal: bring vegetables with you to work    I plan to meet my goal by this date: Next diabetes education visit - December 10th              Patient's most recent   Lab Results   Component Value Date    A1C 8.1 09/04/2018    is not meeting goal of <8.0    INTERVENTION:   Discussion:  Today we discussed healthy eating, discussed portion control and using My Fitness Pal for accountability.  We discussed portion control.  Amarilis is wondering about intermittent fasting and if this would be healthy with diabetes.  Writer explained that it's best to make healthy lifestyle changes, rather than short term diets. Amarilis is also frustrated because she feels like we want her to eat 3 meals/day and 3 snacks/day.  Writer explained that there is a lot of flexibility when it comes to diet and we can adjust her meal plan based on her preferences.  Discussed that it's important to eat every 4-5 hours and to not skip meals, but it doesn't have  "to be a 3 meal, 3 snack meal plan.      We also discuss the progression of diabetes. Amarilis doesn't understand why we would \"pump more insulin into a person with diabetes when their body isn't using the insulin right anyway.\" Writer explained diabetes is a progressive disease and over time your body might not make enough insulin. Explained that the diabetes medications help your body use the insulin that it is making better, but you still might need some help from insulin.  We discussed some options for medication advancement, discussed adding a sulfonylurea or long acting insulin.  Amarilis additional medication at this time as she would like to continue to work on lifestyle changes.     Diabetes knowledge and skills assessment:   Patient is knowledgeable in diabetes management concepts related to: Healthy Eating, Being Active, Taking Medication and Reducing Risks    Patient needs further education on the following diabetes management concepts: Healthy Eating, Being Active, Monitoring, Taking Medication, Problem Solving, Reducing Risks and Healthy Coping    Based on learning assessment above, most appropriate setting for further diabetes education would be: Individual setting.    Education provided today on:  AADE Self-Care Behaviors:  Diabetes Pathophysiology  Healthy Eating: consistency in amount, composition, and timing of food intake, weight reduction and portion control  Being Active: relationship to blood glucose, describe appropriate activity program and precautions to take  Taking Medication: we discussed other medication options and how the medications that she is taking work in her body.  We discussed the action of insulin    Opportunities for ongoing education and support in diabetes-self management were discussed.    Pt verbalized understanding of concepts discussed and recommendations provided today.       Education Materials Provided:  [facility] Taking Charge of Your Diabetes Book    PLAN:  See Patient " Instructions for co-developed, patient-stated behavior change goals.  AVS printed and provided to patient today. See Follow-Up section for recommended follow-up.  Would recommend adding a Sulfonylurea or long acting insulin - fasting BG running around 170 mg/dl, patient declines at this time, will discuss at next visit    Libia Schroeder RD, LD, CDE  Time Spent: 50 minutes  Encounter Type: Individual    Any diabetes medication dose changes were made via the CDE Protocol and Collaborative Practice Agreement with the patient's referring provider. A copy of this encounter was shared with the provider.

## 2019-01-21 ENCOUNTER — TELEPHONE (OUTPATIENT)
Dept: FAMILY MEDICINE | Facility: CLINIC | Age: 59
End: 2019-01-21

## 2019-01-21 NOTE — TELEPHONE ENCOUNTER
Panel Management Review      Patient has the following on her problem list:     Diabetes    ASA: Passed    Last A1C  Lab Results   Component Value Date    A1C 8.1 09/04/2018    A1C 7.9 02/26/2018    A1C 7.3 08/23/2017    A1C 7.0 05/11/2017    A1C 6.8 11/21/2016     A1C tested: MONITOR    Last LDL:    Lab Results   Component Value Date    CHOL 173 02/26/2018     Lab Results   Component Value Date    HDL 41 02/26/2018     Lab Results   Component Value Date    LDL 59 02/26/2018     Lab Results   Component Value Date    TRIG 363 02/26/2018     Lab Results   Component Value Date    CHOLHDLRATIO 4.5 02/27/2015     Lab Results   Component Value Date    NHDL 132 02/26/2018       Is the patient on a Statin? YES             Is the patient on Aspirin? YES    Medications     HMG CoA Reductase Inhibitors    rosuvastatin (CRESTOR) 5 MG tablet    rosuvastatin (CRESTOR) 5 MG tablet    Salicylates    aspirin 81 MG tablet          Last three blood pressure readings:  BP Readings from Last 3 Encounters:   09/04/18 136/82   04/23/18 136/84   02/26/18 132/80       Date of last diabetes office visit: 09/04/18     Tobacco History:     History   Smoking Status     Former Smoker     Packs/day: 1.00     Years: 20.00     Types: Cigarettes     Start date: 1/1/1976     Quit date: 1/1/2005   Smokeless Tobacco     Never Used     Comment: quit 8-9 years ago           Composite cancer screening  Chart review shows that this patient is due/due soon for the following Fecal Colorectal (FIT)  Summary:    Patient is due/failing the following:   FIT    Action needed:   Patient has an appointment on 03/19/19 with Kristen Kehr PA-C for Diabetes check.     Type of outreach:    none    Questions for provider review:    None                                                                                                                                    Duane JACQUES MA       Chart routed to close .

## 2019-03-22 DIAGNOSIS — E03.9 HYPOTHYROIDISM, UNSPECIFIED TYPE: ICD-10-CM

## 2019-03-22 RX ORDER — LEVOTHYROXINE SODIUM 100 UG/1
TABLET ORAL
Qty: 30 TABLET | Refills: 0 | Status: SHIPPED | OUTPATIENT
Start: 2019-03-22 | End: 2019-04-19

## 2019-03-22 NOTE — TELEPHONE ENCOUNTER
1 month supply only as patient is overdue for appointment     Patient due for thyroid labs.   Has pending appointment with lab and PCP in April.     Julissa Ziegler RN, BSN

## 2019-04-19 DIAGNOSIS — E03.9 HYPOTHYROIDISM, UNSPECIFIED TYPE: ICD-10-CM

## 2019-04-19 RX ORDER — LEVOTHYROXINE SODIUM 100 UG/1
TABLET ORAL
Qty: 30 TABLET | Refills: 0 | Status: SHIPPED | OUTPATIENT
Start: 2019-04-19 | End: 2019-04-30

## 2019-04-23 DIAGNOSIS — E11.29 TYPE 2 DIABETES MELLITUS WITH MICROALBUMINURIA, WITHOUT LONG-TERM CURRENT USE OF INSULIN (H): ICD-10-CM

## 2019-04-23 DIAGNOSIS — E78.2 MIXED HYPERLIPIDEMIA: ICD-10-CM

## 2019-04-23 DIAGNOSIS — I25.10 CORONARY ARTERY DISEASE INVOLVING NATIVE CORONARY ARTERY OF NATIVE HEART WITHOUT ANGINA PECTORIS: ICD-10-CM

## 2019-04-23 DIAGNOSIS — R80.9 TYPE 2 DIABETES MELLITUS WITH MICROALBUMINURIA, WITHOUT LONG-TERM CURRENT USE OF INSULIN (H): ICD-10-CM

## 2019-04-23 LAB
ANION GAP SERPL CALCULATED.3IONS-SCNC: 9 MMOL/L (ref 3–14)
BUN SERPL-MCNC: 15 MG/DL (ref 7–30)
CALCIUM SERPL-MCNC: 9.6 MG/DL (ref 8.5–10.1)
CHLORIDE SERPL-SCNC: 100 MMOL/L (ref 94–109)
CHOLEST SERPL-MCNC: 190 MG/DL
CO2 SERPL-SCNC: 28 MMOL/L (ref 20–32)
CREAT SERPL-MCNC: 0.62 MG/DL (ref 0.52–1.04)
GFR SERPL CREATININE-BSD FRML MDRD: >90 ML/MIN/{1.73_M2}
GLUCOSE SERPL-MCNC: 158 MG/DL (ref 70–99)
HBA1C MFR BLD: 7.7 % (ref 0–5.6)
HDLC SERPL-MCNC: 37 MG/DL
LDLC SERPL CALC-MCNC: 110 MG/DL
NONHDLC SERPL-MCNC: 153 MG/DL
POTASSIUM SERPL-SCNC: 3.8 MMOL/L (ref 3.4–5.3)
SODIUM SERPL-SCNC: 137 MMOL/L (ref 133–144)
TRIGL SERPL-MCNC: 217 MG/DL
TSH SERPL DL<=0.005 MIU/L-ACNC: 0.44 MU/L (ref 0.4–4)
VIT B12 SERPL-MCNC: 873 PG/ML (ref 193–986)

## 2019-04-23 PROCEDURE — 80048 BASIC METABOLIC PNL TOTAL CA: CPT | Performed by: PHYSICIAN ASSISTANT

## 2019-04-23 PROCEDURE — 83036 HEMOGLOBIN GLYCOSYLATED A1C: CPT | Performed by: PHYSICIAN ASSISTANT

## 2019-04-23 PROCEDURE — 36415 COLL VENOUS BLD VENIPUNCTURE: CPT | Performed by: PHYSICIAN ASSISTANT

## 2019-04-23 PROCEDURE — 82607 VITAMIN B-12: CPT | Performed by: PHYSICIAN ASSISTANT

## 2019-04-23 PROCEDURE — 84443 ASSAY THYROID STIM HORMONE: CPT | Performed by: PHYSICIAN ASSISTANT

## 2019-04-23 PROCEDURE — 80061 LIPID PANEL: CPT | Performed by: PHYSICIAN ASSISTANT

## 2019-04-24 ENCOUNTER — MYC MEDICAL ADVICE (OUTPATIENT)
Dept: FAMILY MEDICINE | Facility: CLINIC | Age: 59
End: 2019-04-24

## 2019-04-26 DIAGNOSIS — R80.9 TYPE 2 DIABETES MELLITUS WITH MICROALBUMINURIA, WITHOUT LONG-TERM CURRENT USE OF INSULIN (H): ICD-10-CM

## 2019-04-26 DIAGNOSIS — E11.29 TYPE 2 DIABETES MELLITUS WITH MICROALBUMINURIA, WITHOUT LONG-TERM CURRENT USE OF INSULIN (H): ICD-10-CM

## 2019-04-26 DIAGNOSIS — I10 BENIGN ESSENTIAL HYPERTENSION: ICD-10-CM

## 2019-04-26 RX ORDER — METFORMIN HCL 500 MG
TABLET, EXTENDED RELEASE 24 HR ORAL
Qty: 360 TABLET | Refills: 0 | Status: SHIPPED | OUTPATIENT
Start: 2019-04-26 | End: 2019-04-30

## 2019-04-26 RX ORDER — LISINOPRIL AND HYDROCHLOROTHIAZIDE 20; 25 MG/1; MG/1
TABLET ORAL
Qty: 90 TABLET | Refills: 0 | Status: SHIPPED | OUTPATIENT
Start: 2019-04-26 | End: 2019-04-30

## 2019-04-30 ENCOUNTER — OFFICE VISIT (OUTPATIENT)
Dept: FAMILY MEDICINE | Facility: CLINIC | Age: 59
End: 2019-04-30
Payer: COMMERCIAL

## 2019-04-30 VITALS
DIASTOLIC BLOOD PRESSURE: 88 MMHG | OXYGEN SATURATION: 97 % | SYSTOLIC BLOOD PRESSURE: 136 MMHG | HEIGHT: 65 IN | HEART RATE: 90 BPM | BODY MASS INDEX: 29.82 KG/M2 | WEIGHT: 179 LBS | TEMPERATURE: 97.9 F

## 2019-04-30 DIAGNOSIS — I10 BENIGN ESSENTIAL HYPERTENSION: ICD-10-CM

## 2019-04-30 DIAGNOSIS — E11.29 TYPE 2 DIABETES MELLITUS WITH MICROALBUMINURIA, WITHOUT LONG-TERM CURRENT USE OF INSULIN (H): ICD-10-CM

## 2019-04-30 DIAGNOSIS — Z12.11 SCREEN FOR COLON CANCER: ICD-10-CM

## 2019-04-30 DIAGNOSIS — E78.2 MIXED HYPERLIPIDEMIA: ICD-10-CM

## 2019-04-30 DIAGNOSIS — Z12.31 VISIT FOR SCREENING MAMMOGRAM: ICD-10-CM

## 2019-04-30 DIAGNOSIS — R80.9 TYPE 2 DIABETES MELLITUS WITH MICROALBUMINURIA, WITHOUT LONG-TERM CURRENT USE OF INSULIN (H): ICD-10-CM

## 2019-04-30 DIAGNOSIS — Z13.5 SCREENING FOR DIABETIC RETINOPATHY: ICD-10-CM

## 2019-04-30 DIAGNOSIS — Z13.89 SCREENING FOR DIABETIC PERIPHERAL NEUROPATHY: ICD-10-CM

## 2019-04-30 DIAGNOSIS — E03.9 HYPOTHYROIDISM, UNSPECIFIED TYPE: ICD-10-CM

## 2019-04-30 PROCEDURE — 99214 OFFICE O/P EST MOD 30 MIN: CPT | Performed by: PHYSICIAN ASSISTANT

## 2019-04-30 PROCEDURE — 99207 C FOOT EXAM  NO CHARGE: CPT | Performed by: PHYSICIAN ASSISTANT

## 2019-04-30 RX ORDER — LEVOTHYROXINE SODIUM 100 UG/1
TABLET ORAL
Qty: 90 TABLET | Refills: 3 | Status: SHIPPED | OUTPATIENT
Start: 2019-04-30 | End: 2020-04-20

## 2019-04-30 RX ORDER — ROSUVASTATIN CALCIUM 5 MG/1
5 TABLET, COATED ORAL DAILY
Qty: 90 TABLET | Refills: 3 | Status: SHIPPED | OUTPATIENT
Start: 2019-04-30 | End: 2019-11-05

## 2019-04-30 RX ORDER — LISINOPRIL AND HYDROCHLOROTHIAZIDE 20; 25 MG/1; MG/1
1 TABLET ORAL DAILY
Qty: 90 TABLET | Refills: 1 | Status: SHIPPED | OUTPATIENT
Start: 2019-04-30 | End: 2019-11-05

## 2019-04-30 RX ORDER — METFORMIN HCL 500 MG
TABLET, EXTENDED RELEASE 24 HR ORAL
Qty: 360 TABLET | Refills: 1 | Status: SHIPPED | OUTPATIENT
Start: 2019-04-30 | End: 2019-11-05

## 2019-04-30 RX ORDER — LIRAGLUTIDE 6 MG/ML
INJECTION SUBCUTANEOUS
Qty: 18 ML | Refills: 3 | Status: SHIPPED | OUTPATIENT
Start: 2019-04-30 | End: 2019-11-05

## 2019-04-30 ASSESSMENT — MIFFLIN-ST. JEOR: SCORE: 1392.82

## 2019-04-30 ASSESSMENT — PAIN SCALES - GENERAL: PAINLEVEL: NO PAIN (0)

## 2019-04-30 NOTE — NURSING NOTE
"Chief Complaint   Patient presents with     Diabetes       Initial BP (!) 162/92   Pulse 90   Temp 97.9  F (36.6  C) (Oral)   Ht 1.651 m (5' 5\")   Wt 81.2 kg (179 lb)   SpO2 97%   BMI 29.79 kg/m   Estimated body mass index is 29.79 kg/m  as calculated from the following:    Height as of this encounter: 1.651 m (5' 5\").    Weight as of this encounter: 81.2 kg (179 lb).  Medication Reconciliation: complete    TIA Payne MA    "

## 2019-04-30 NOTE — PROGRESS NOTES
SUBJECTIVE:   Amarilis Vazquez is a 59 year old female who presents to clinic today for the following   health issues:      Jyoti is frustrated with taking all of her medications and trying to make better lifestyle choices. Her goal is to get off of her medication.     Diabetes Follow-up      Patient is checking blood sugars: checking after eating    Diabetic concerns: None     Symptoms of hypoglycemia (low blood sugar):      Paresthesias (numbness or burning in feet) or sores: Yes tingling     Date of last diabetic eye exam: 01/08/18    BP Readings from Last 2 Encounters:   09/04/18 136/82   04/23/18 136/84     Hemoglobin A1C (%)   Date Value   04/23/2019 7.7 (H)   09/04/2018 8.1 (H)     LDL Cholesterol Calculated (mg/dL)   Date Value   04/23/2019 110 (H)   02/26/2018 59         PROBLEMS TO ADD ON...  Hyperlipidemia Follow-Up      Rate your low fat/cholesterol diet?: good    Taking statin?  Yes, no muscle aches from statin    Other lipid medications/supplements?:  Listed in medications    Hypertension Follow-up      Outpatient blood pressures are not being checked.    Low Salt Diet: not monitoring salt    BP Readings from Last 2 Encounters:   04/30/19 136/88   09/04/18 136/82     Hemoglobin A1C (%)   Date Value   04/23/2019 7.7 (H)   09/04/2018 8.1 (H)     LDL Cholesterol Calculated (mg/dL)   Date Value   04/23/2019 110 (H)   02/26/2018 59       Additional history: as documented    Reviewed  and updated as needed this visit by clinical staff         Reviewed and updated as needed this visit by Provider         Patient Active Problem List   Diagnosis     HTN, goal below 140/90     Hypothyroidism     CAD (coronary artery disease)     Obesity     Coronary artery disease involving native coronary artery of native heart without angina pectoris     Mixed hyperlipidemia     Advanced directives, counseling/discussion     Type 2 diabetes mellitus with microalbuminuria, without long-term current use of insulin (H)      Insomnia, unspecified type     Past Surgical History:   Procedure Laterality Date     D & C         Social History     Tobacco Use     Smoking status: Former Smoker     Packs/day: 1.00     Years: 20.00     Pack years: 20.00     Types: Cigarettes     Start date: 1976     Last attempt to quit: 2005     Years since quittin.3     Smokeless tobacco: Never Used     Tobacco comment: quit 8-9 years ago   Substance Use Topics     Alcohol use: Yes     Comment: seldom     Family History   Problem Relation Age of Onset     Cerebrovascular Disease Mother      Cancer Mother         lung     C.A.D. Mother         MI age 50?     C.A.D. Paternal Grandfather 50        MI     C.A.D. Maternal Grandmother      Diabetes Maternal Grandmother      Thyroid Disease Maternal Grandmother      C.A.D. Maternal Grandfather      Thyroid Disease Son      Breast Cancer No family hx of      Gynecology No family hx of         No ovarian, endometrial cancer     Osteoporosis No family hx of      Ovarian Cancer No family hx of      Cancer - colorectal No family hx of          Current Outpatient Medications   Medication Sig Dispense Refill     Alpha Lipoic Acid 200 MG CAPS        aspirin 81 MG tablet Take 1 tablet by mouth daily.       B Complex Vitamins (B COMPLEX PO) Super B complex       Coenzyme Q10 (CO Q 10 PO) Take  by mouth.       cyanocobalamin 1000 MCG SUBL sublingual tablet Place 1,000 mcg under the tongue daily       Fish Oil OIL 1,400 mg       Flaxseed, Linseed, (FLAXSEED OIL) 1000 MG CAPS Take  by mouth.       insulin pen needle 32G X 4 MM Use 1 pen needles daily or as directed. 100 each 5     levothyroxine (SYNTHROID/LEVOTHROID) 100 MCG tablet TAKE 1 TABLET BY MOUTH EVERY DAY. **APPT NEEDED FOR REFILLS** 90 tablet 3     liraglutide (VICTOZA PEN) 18 MG/3ML solution INJECT 1.2 MG SUBCUTANEOUS DAILY 18 mL 3     liraglutide (VICTOZA) 18 MG/3ML soln Inject 1.8 mg Subcutaneous daily 18 mL 1     lisinopril-hydrochlorothiazide  "(PRINZIDE/ZESTORETIC) 20-25 MG tablet Take 1 tablet by mouth daily 90 tablet 1     Magnesium 400 MG TABS        metFORMIN (GLUCOPHAGE-XR) 500 MG 24 hr tablet TAKE 2 TABLETS BY MOUTH TWICE A  tablet 1     Multiple Vitamins-Minerals (COMPLETE MULTIVITAMIN/MINERAL PO) Take  by mouth.       rosuvastatin (CRESTOR) 5 MG tablet Take 1 tablet (5 mg) by mouth daily 90 tablet 3     No Known Allergies  Recent Labs   Lab Test 04/23/19  0809 09/04/18  0840 02/26/18  1006  08/23/17  1042  07/21/15  0848  05/16/14  0844   A1C 7.7* 8.1* 7.9*  --  7.3*   < >  --    < >  --    *  --  59  --  156*   < >  --    < >  --    HDL 37*  --  41*  --  41*   < >  --    < >  --    TRIG 217*  --  363*  --  355*   < >  --    < >  --    ALT  --   --  46  --   --   --  36  --  30   CR 0.62  --  0.61  --  0.63   < > 0.65  --  0.75   GFRESTIMATED >90  --  >90  --  >90   < > >90  Non  GFR Calc    --  81   GFRESTBLACK >90  --  >90  --  >90   < > >90  African American GFR Calc    --  >90   POTASSIUM 3.8  --  4.3  --  4.1   < > 3.9  --  4.2   TSH 0.44  --  0.49   < > 0.10*   < > 8.96*  --  1.89    < > = values in this interval not displayed.      BP Readings from Last 3 Encounters:   04/30/19 136/88   09/04/18 136/82   04/23/18 136/84    Wt Readings from Last 3 Encounters:   04/30/19 81.2 kg (179 lb)   11/26/18 80.5 kg (177 lb 8 oz)   11/12/18 81.2 kg (179 lb)                    ROS:  Constitutional, HEENT, cardiovascular, pulmonary, GI, , musculoskeletal, neuro, skin, endocrine and psych systems are negative, except as otherwise noted.    OBJECTIVE:     /88   Pulse 90   Temp 97.9  F (36.6  C) (Oral)   Ht 1.651 m (5' 5\")   Wt 81.2 kg (179 lb)   SpO2 97%   BMI 29.79 kg/m    Body mass index is 29.79 kg/m .  GENERAL: healthy, alert and no distress  RESP: lungs clear to auscultation - no rales, rhonchi or wheezes  CV: regular rate and rhythm, normal S1 S2, no S3 or S4, no murmur, click or rub, no peripheral edema " and peripheral pulses strong  MS: no gross musculoskeletal defects noted, no edema  SKIN: no suspicious lesions or rashes  PSYCH: mentation appears normal, affect normal/bright  Diabetic foot exam: normal DP and PT pulses, no trophic changes or ulcerative lesions, normal sensory exam and normal monofilament exam    Diagnostic Test Results:  none     ASSESSMENT/PLAN:         1. Type 2 diabetes mellitus with microalbuminuria, without long-term current use of insulin (H)  Discussed her medications, risks, benefits and side effects in detail.   Encouraged her to continue with the statin, even if she is taking this every other day there will be some benefit to her. She has a CAD. She is aware of Cardiovascular risks associated with her heart history and diabetes.   - lisinopril-hydrochlorothiazide (PRINZIDE/ZESTORETIC) 20-25 MG tablet; Take 1 tablet by mouth daily  Dispense: 90 tablet; Refill: 1  - metFORMIN (GLUCOPHAGE-XR) 500 MG 24 hr tablet; TAKE 2 TABLETS BY MOUTH TWICE A DAY  Dispense: 360 tablet; Refill: 1  - liraglutide (VICTOZA PEN) 18 MG/3ML solution; INJECT 1.2 MG SUBCUTANEOUS DAILY  Dispense: 18 mL; Refill: 3    2. Mixed hyperlipidemia  See above.   - rosuvastatin (CRESTOR) 5 MG tablet; Take 1 tablet (5 mg) by mouth daily  Dispense: 90 tablet; Refill: 3    3. Hypothyroidism, unspecified type  Stable, refill  - levothyroxine (SYNTHROID/LEVOTHROID) 100 MCG tablet; TAKE 1 TABLET BY MOUTH EVERY DAY. **APPT NEEDED FOR REFILLS**  Dispense: 90 tablet; Refill: 3    4. Benign essential hypertension  Blood pressure improves on recheck.   - lisinopril-hydrochlorothiazide (PRINZIDE/ZESTORETIC) 20-25 MG tablet; Take 1 tablet by mouth daily  Dispense: 90 tablet; Refill: 1    5. Screen for colon cancer  - Fecal colorectal cancer screen FIT - Future (S+30); Future    6. Visit for screening mammogram  - MA SCREENING DIGITAL BILAT - Future  (s+30); Future    7. Screening for diabetic retinopathy  Eye exam was completed this  year    8. Screening for diabetic peripheral neuropathy  - FOOT EXAM  NO CHARGE [[id number]]        Kristen M. Kehr, PA-C  [facility]

## 2019-05-16 ENCOUNTER — MYC MEDICAL ADVICE (OUTPATIENT)
Dept: FAMILY MEDICINE | Facility: CLINIC | Age: 59
End: 2019-05-16

## 2019-05-16 DIAGNOSIS — E11.29 TYPE 2 DIABETES MELLITUS WITH MICROALBUMINURIA, WITHOUT LONG-TERM CURRENT USE OF INSULIN (H): Primary | ICD-10-CM

## 2019-05-16 DIAGNOSIS — R80.9 TYPE 2 DIABETES MELLITUS WITH MICROALBUMINURIA, WITHOUT LONG-TERM CURRENT USE OF INSULIN (H): Primary | ICD-10-CM

## 2019-05-16 NOTE — TELEPHONE ENCOUNTER
Please have Jyoti work with Lili in Diabetes Education to get this done. Please give her Lili's contact information to schedule an appointment.   Thank you.   Kristen Kehr PA-C

## 2019-05-17 NOTE — TELEPHONE ENCOUNTER
Notified patient of message below. Patient states she has seen Diabetic Education for past 5 years. Patient declined phone # to schedule and appointment. Patient asking if Kristen Kehr would just prescribe this without seeing Lili./Betty Gonzalez,

## 2019-05-21 RX ORDER — FLASH GLUCOSE SCANNING READER
1 EACH MISCELLANEOUS CONTINUOUS
Qty: 1 DEVICE | Refills: 0 | Status: SHIPPED | OUTPATIENT
Start: 2019-05-21 | End: 2023-11-24 | Stop reason: ALTCHOICE

## 2019-05-21 RX ORDER — FLASH GLUCOSE SENSOR
1 KIT MISCELLANEOUS CONTINUOUS
Qty: 2 EACH | Refills: 11 | Status: SHIPPED | OUTPATIENT
Start: 2019-05-21 | End: 2023-11-24 | Stop reason: ALTCHOICE

## 2019-05-21 NOTE — TELEPHONE ENCOUNTER
I sent the prescription.   FYI: there are no prior authorizations done for this.   Kristen Kehr PA-C

## 2019-06-12 DIAGNOSIS — E11.29 TYPE 2 DIABETES MELLITUS WITH MICROALBUMINURIA, WITHOUT LONG-TERM CURRENT USE OF INSULIN (H): Primary | ICD-10-CM

## 2019-06-12 DIAGNOSIS — R80.9 TYPE 2 DIABETES MELLITUS WITH MICROALBUMINURIA, WITHOUT LONG-TERM CURRENT USE OF INSULIN (H): Primary | ICD-10-CM

## 2019-06-13 NOTE — TELEPHONE ENCOUNTER
To provider:  I just want to clarify before we reach out the pharmacy.  It looks like last fill for Victoza was for 1.8 mg daily the last one (sent 4/30/19) was for 1.2 daily.  Is this change correct so we can have the pharmacy discontinue the 1.8 mg daily?  Thank you. Jackie Almodovra R.N.

## 2019-06-14 RX ORDER — LIRAGLUTIDE 6 MG/ML
1.8 INJECTION SUBCUTANEOUS DAILY
Qty: 9 ML | Refills: 3 | Status: SHIPPED | OUTPATIENT
Start: 2019-06-14 | End: 2019-07-11

## 2019-07-08 ENCOUNTER — TRANSFERRED RECORDS (OUTPATIENT)
Dept: HEALTH INFORMATION MANAGEMENT | Facility: CLINIC | Age: 59
End: 2019-07-08

## 2019-07-11 ENCOUNTER — TELEPHONE (OUTPATIENT)
Dept: FAMILY MEDICINE | Facility: CLINIC | Age: 59
End: 2019-07-11

## 2019-07-11 DIAGNOSIS — E11.29 TYPE 2 DIABETES MELLITUS WITH MICROALBUMINURIA, WITHOUT LONG-TERM CURRENT USE OF INSULIN (H): ICD-10-CM

## 2019-07-11 DIAGNOSIS — R80.9 TYPE 2 DIABETES MELLITUS WITH MICROALBUMINURIA, WITHOUT LONG-TERM CURRENT USE OF INSULIN (H): ICD-10-CM

## 2019-07-11 RX ORDER — LIRAGLUTIDE 6 MG/ML
1.8 INJECTION SUBCUTANEOUS DAILY
Qty: 27 ML | Refills: 1 | Status: SHIPPED | OUTPATIENT
Start: 2019-07-11 | End: 2019-11-05

## 2019-07-11 NOTE — TELEPHONE ENCOUNTER
RN contacted pt to clarify request.   Pt states current Victoza dose is 1.8mg per dose. Pt states only a 30-day supply order was available at [facility] in [facility] [place], but she wants 90-day-supply for insurance copay savings.     Pt is requesting that at 90-day-supply be sent to [facility] at [address] [place] [address], because other location told pt they don't have 90-day-supply available.  Pt's next appointment is scheduled in appropriate time-frame so 90-day-supply with refills to last until 11/5/19 office visit provided per [facility] RN Refill Protocol.    RN confirmed with pharmacy staff at [address] location that the 90-day supply has been received and they are aware pt is on her way. Technician states that only a 30-day supply order had been transferred from the other location, so she will now get the 90-day supply ready instead.    liraglutide (VICTOZA PEN) 18 MG/3ML solution 27 mL 1 7/11/2019  No   Sig - Route: Inject 1.8 mg Subcutaneous daily - Subcutaneous   Sent to pharmacy as: liraglutide (VICTOZA PEN) 18 MG/3ML solution         E-Prescribing Status: Receipt confirmed by pharmacy (7/11/2019 11:46 AM CDT)     Nanette Wise, DORONN, RN

## 2019-07-11 NOTE — TELEPHONE ENCOUNTER
Per patient RX Victoza was written incorrectly. Per patient, the pharmacy does not have 90 days worth. Please call patient to advise.

## 2019-10-01 ENCOUNTER — OFFICE VISIT (OUTPATIENT)
Dept: URGENT CARE | Facility: URGENT CARE | Age: 59
End: 2019-10-01
Payer: COMMERCIAL

## 2019-10-01 VITALS
RESPIRATION RATE: 16 BRPM | SYSTOLIC BLOOD PRESSURE: 152 MMHG | WEIGHT: 182 LBS | OXYGEN SATURATION: 98 % | HEIGHT: 65 IN | DIASTOLIC BLOOD PRESSURE: 82 MMHG | TEMPERATURE: 98.2 F | BODY MASS INDEX: 30.32 KG/M2 | HEART RATE: 88 BPM

## 2019-10-01 DIAGNOSIS — N39.0 URINARY TRACT INFECTION WITHOUT HEMATURIA, SITE UNSPECIFIED: Primary | ICD-10-CM

## 2019-10-01 DIAGNOSIS — R39.89 URINE TROUBLES: ICD-10-CM

## 2019-10-01 DIAGNOSIS — R82.90 NONSPECIFIC FINDING ON EXAMINATION OF URINE: ICD-10-CM

## 2019-10-01 DIAGNOSIS — E11.9 TYPE 2 DIABETES MELLITUS WITHOUT COMPLICATION, UNSPECIFIED WHETHER LONG TERM INSULIN USE (H): ICD-10-CM

## 2019-10-01 LAB
ALBUMIN UR-MCNC: 30 MG/DL
APPEARANCE UR: ABNORMAL
BACTERIA #/AREA URNS HPF: ABNORMAL /HPF
BILIRUB UR QL STRIP: NEGATIVE
COLOR UR AUTO: YELLOW
GLUCOSE UR STRIP-MCNC: NEGATIVE MG/DL
HGB UR QL STRIP: ABNORMAL
KETONES UR STRIP-MCNC: NEGATIVE MG/DL
LEUKOCYTE ESTERASE UR QL STRIP: ABNORMAL
MUCOUS THREADS #/AREA URNS LPF: PRESENT /LPF
NITRATE UR QL: NEGATIVE
NON-SQ EPI CELLS #/AREA URNS LPF: ABNORMAL /LPF
PH UR STRIP: 7 PH (ref 5–7)
RBC #/AREA URNS AUTO: ABNORMAL /HPF
SOURCE: ABNORMAL
SP GR UR STRIP: 1.01 (ref 1–1.03)
UROBILINOGEN UR STRIP-ACNC: 1 EU/DL (ref 0.2–1)
WBC #/AREA URNS AUTO: ABNORMAL /HPF
WBC CLUMPS #/AREA URNS HPF: PRESENT /HPF

## 2019-10-01 PROCEDURE — 81001 URINALYSIS AUTO W/SCOPE: CPT | Performed by: PHYSICIAN ASSISTANT

## 2019-10-01 PROCEDURE — 99214 OFFICE O/P EST MOD 30 MIN: CPT | Performed by: PHYSICIAN ASSISTANT

## 2019-10-01 PROCEDURE — 87186 SC STD MICRODIL/AGAR DIL: CPT | Performed by: PHYSICIAN ASSISTANT

## 2019-10-01 PROCEDURE — 87088 URINE BACTERIA CULTURE: CPT | Performed by: PHYSICIAN ASSISTANT

## 2019-10-01 PROCEDURE — 87086 URINE CULTURE/COLONY COUNT: CPT | Performed by: PHYSICIAN ASSISTANT

## 2019-10-01 RX ORDER — NITROFURANTOIN 25; 75 MG/1; MG/1
100 CAPSULE ORAL 2 TIMES DAILY
Qty: 10 CAPSULE | Refills: 0 | Status: SHIPPED | OUTPATIENT
Start: 2019-10-01 | End: 2019-11-05

## 2019-10-01 ASSESSMENT — MIFFLIN-ST. JEOR: SCORE: 1406.43

## 2019-10-02 NOTE — PROGRESS NOTES
S: 59-year-old female presents with dysuria, urgency and frequency for 3 days.  No prior history of UTI.  She is diabetic.  Blood sugars have been normal.  No nausea or vomiting.  No fever.  No headache.  Some right-sided low back pain.  No rash.    No Known Allergies    Past Medical History:   Diagnosis Date     Coronary artery disease      Diabetic eye exam (H) 04/12/11    [facility]     Diabetic eye exam (H) 01/28/12, 1/6/14    [facility]     Other and unspecified hyperlipidemia      Type II or unspecified type diabetes mellitus without mention of complication, not stated as uncontrolled     Diabetes mellitus     Unspecified essential hypertension      Unspecified hypothyroidism     Hypothyroidism       Alpha Lipoic Acid 200 MG CAPS,   aspirin 81 MG tablet, Take 1 tablet by mouth daily.  B Complex Vitamins (B COMPLEX PO), Super B complex  Coenzyme Q10 (CO Q 10 PO), Take  by mouth.  Continuous Blood Gluc  (FREESTYLE KANA 14 DAY READER) CRISTAL, 1 Device continuous  Continuous Blood Gluc Sensor (FREESTYLE KANA 14 DAY SENSOR) MISC, 1 Box continuous  cyanocobalamin 1000 MCG SUBL sublingual tablet, Place 1,000 mcg under the tongue daily  Fish Oil OIL, 1,400 mg  Flaxseed, Linseed, (FLAXSEED OIL) 1000 MG CAPS, Take  by mouth.  insulin pen needle 32G X 4 MM, Use 1 pen needles daily or as directed.  levothyroxine (SYNTHROID/LEVOTHROID) 100 MCG tablet, TAKE 1 TABLET BY MOUTH EVERY DAY. **APPT NEEDED FOR REFILLS**  liraglutide (VICTOZA PEN) 18 MG/3ML solution, Inject 1.8 mg Subcutaneous daily  liraglutide (VICTOZA PEN) 18 MG/3ML solution, INJECT 1.2 MG SUBCUTANEOUS DAILY  lisinopril-hydrochlorothiazide (PRINZIDE/ZESTORETIC) 20-25 MG tablet, Take 1 tablet by mouth daily  Magnesium 400 MG TABS,   metFORMIN (GLUCOPHAGE-XR) 500 MG 24 hr tablet, TAKE 2 TABLETS BY MOUTH TWICE A DAY  Multiple Vitamins-Minerals (COMPLETE MULTIVITAMIN/MINERAL PO), Take  by mouth.  rosuvastatin (CRESTOR) 5 MG tablet, Take  "1 tablet (5 mg) by mouth daily    No current facility-administered medications on file prior to visit.       Social History     Tobacco Use     Smoking status: Former Smoker     Packs/day: 1.00     Years: 20.00     Pack years: 20.00     Types: Cigarettes     Start date: 1976     Last attempt to quit: 2005     Years since quittin.7     Smokeless tobacco: Never Used     Tobacco comment: quit 8-9 years ago   Substance Use Topics     Alcohol use: Yes     Comment: seldom       ROS:  CONSTITUTIONAL: Negative for fatigue or fever.  EYES: Negative for eye problems.  ENT: Negative   RESP: Negative  CV: Negative for chest pains.  GI: Negative for vomiting.  MUSCULOSKELETAL: As above.  NEUROLOGIC: Negative for headaches.  SKIN: Negative for rash.  PSYCH: Normal mentation for age.  -as above    OBJECTIVE:  BP (!) 152/82   Pulse 88   Temp 98.2  F (36.8  C) (Oral)   Resp 16   Ht 1.651 m (5' 5\")   Wt 82.6 kg (182 lb)   SpO2 98%   BMI 30.29 kg/m    GENERAL APPEARANCE: Healthy, alert and no distress.  EYES:Conjunctiva/sclera clear.  NECK: Moves head/neck without pain or difficulty   RESP: Breathing comfortably   CV: Regular rate and rhythm, normal S1 S2, no murmur noted.  NEURO: Awake, alert    SKIN: No rashes  Back-possibly mild right CVA tenderness  Abdomen-soft, mild suprapubic tenderness.  Normal active bowel sounds.  No rigidity, guarding or rebound    ASSESSMENT:     ICD-10-CM    1. Urinary tract infection without hematuria, site unspecified N39.0 nitroFURantoin macrocrystal-monohydrate (MACROBID) 100 MG capsule   2. Urine troubles R39.89 UA reflex to Microscopic and Culture     Urine Microscopic   3. Nonspecific finding on examination of urine R82.90 Urine Culture Aerobic Bacterial         PLAN: Normal kidney function 2019 according to chart.  Recheck 3 days if not better.  Urine culture pending.  I have discussed clinical findings with patient.  Side effects of medications discussed.  Symptomatic " care is discussed.  I have discussed the possibility of  worsening symptoms and to RTC or ER if they occur.  All questions are answered and patient is in agreement with plan.   Patient care instructions are given to at the end of visit.   Lots of rest and fluids.    Kat Corbett PA-C

## 2019-10-03 LAB
BACTERIA SPEC CULT: ABNORMAL
SPECIMEN SOURCE: ABNORMAL

## 2019-10-21 ENCOUNTER — DOCUMENTATION ONLY (OUTPATIENT)
Dept: LAB | Facility: CLINIC | Age: 59
End: 2019-10-21

## 2019-10-21 DIAGNOSIS — R80.9 TYPE 2 DIABETES MELLITUS WITH MICROALBUMINURIA, WITHOUT LONG-TERM CURRENT USE OF INSULIN (H): Primary | ICD-10-CM

## 2019-10-21 DIAGNOSIS — E11.29 TYPE 2 DIABETES MELLITUS WITH MICROALBUMINURIA, WITHOUT LONG-TERM CURRENT USE OF INSULIN (H): Primary | ICD-10-CM

## 2019-10-21 NOTE — PROGRESS NOTES
Please review and sign Pending Pre-visit Labs in [facility]. Labs 10/28/19 and Diabetic check 11/05/19   Shanita GUERRA

## 2019-10-28 DIAGNOSIS — R80.9 TYPE 2 DIABETES MELLITUS WITH MICROALBUMINURIA, WITHOUT LONG-TERM CURRENT USE OF INSULIN (H): ICD-10-CM

## 2019-10-28 DIAGNOSIS — E11.29 TYPE 2 DIABETES MELLITUS WITH MICROALBUMINURIA, WITHOUT LONG-TERM CURRENT USE OF INSULIN (H): ICD-10-CM

## 2019-10-28 LAB
ANION GAP SERPL CALCULATED.3IONS-SCNC: 6 MMOL/L (ref 3–14)
BUN SERPL-MCNC: 13 MG/DL (ref 7–30)
CALCIUM SERPL-MCNC: 9.5 MG/DL (ref 8.5–10.1)
CHLORIDE SERPL-SCNC: 101 MMOL/L (ref 94–109)
CHOLEST SERPL-MCNC: 204 MG/DL
CO2 SERPL-SCNC: 29 MMOL/L (ref 20–32)
CREAT SERPL-MCNC: 0.63 MG/DL (ref 0.52–1.04)
CREAT UR-MCNC: 98 MG/DL
GFR SERPL CREATININE-BSD FRML MDRD: >90 ML/MIN/{1.73_M2}
GLUCOSE SERPL-MCNC: 170 MG/DL (ref 70–99)
HBA1C MFR BLD: 7.4 % (ref 0–5.6)
HDLC SERPL-MCNC: 41 MG/DL
LDLC SERPL CALC-MCNC: 101 MG/DL
MICROALBUMIN UR-MCNC: 17 MG/L
MICROALBUMIN/CREAT UR: 17.52 MG/G CR (ref 0–25)
NONHDLC SERPL-MCNC: 163 MG/DL
POTASSIUM SERPL-SCNC: 4.2 MMOL/L (ref 3.4–5.3)
SODIUM SERPL-SCNC: 136 MMOL/L (ref 133–144)
TRIGL SERPL-MCNC: 310 MG/DL

## 2019-10-28 PROCEDURE — 80048 BASIC METABOLIC PNL TOTAL CA: CPT | Performed by: PHYSICIAN ASSISTANT

## 2019-10-28 PROCEDURE — 82043 UR ALBUMIN QUANTITATIVE: CPT | Performed by: PHYSICIAN ASSISTANT

## 2019-10-28 PROCEDURE — 80061 LIPID PANEL: CPT | Performed by: PHYSICIAN ASSISTANT

## 2019-10-28 PROCEDURE — 36415 COLL VENOUS BLD VENIPUNCTURE: CPT | Performed by: PHYSICIAN ASSISTANT

## 2019-10-28 PROCEDURE — 83036 HEMOGLOBIN GLYCOSYLATED A1C: CPT | Performed by: PHYSICIAN ASSISTANT

## 2019-11-04 DIAGNOSIS — R80.9 TYPE 2 DIABETES MELLITUS WITH MICROALBUMINURIA, WITHOUT LONG-TERM CURRENT USE OF INSULIN (H): ICD-10-CM

## 2019-11-04 DIAGNOSIS — E11.29 TYPE 2 DIABETES MELLITUS WITH MICROALBUMINURIA, WITHOUT LONG-TERM CURRENT USE OF INSULIN (H): ICD-10-CM

## 2019-11-04 PROCEDURE — 82274 ASSAY TEST FOR BLOOD FECAL: CPT | Performed by: PHYSICIAN ASSISTANT

## 2019-11-05 ENCOUNTER — OFFICE VISIT (OUTPATIENT)
Dept: FAMILY MEDICINE | Facility: CLINIC | Age: 59
End: 2019-11-05
Payer: COMMERCIAL

## 2019-11-05 VITALS
DIASTOLIC BLOOD PRESSURE: 82 MMHG | OXYGEN SATURATION: 98 % | TEMPERATURE: 98.2 F | BODY MASS INDEX: 30.29 KG/M2 | HEART RATE: 94 BPM | WEIGHT: 182 LBS | SYSTOLIC BLOOD PRESSURE: 134 MMHG

## 2019-11-05 DIAGNOSIS — E78.2 MIXED HYPERLIPIDEMIA: ICD-10-CM

## 2019-11-05 DIAGNOSIS — R80.9 TYPE 2 DIABETES MELLITUS WITH MICROALBUMINURIA, WITHOUT LONG-TERM CURRENT USE OF INSULIN (H): ICD-10-CM

## 2019-11-05 DIAGNOSIS — I10 BENIGN ESSENTIAL HYPERTENSION: ICD-10-CM

## 2019-11-05 DIAGNOSIS — E11.29 TYPE 2 DIABETES MELLITUS WITH MICROALBUMINURIA, WITHOUT LONG-TERM CURRENT USE OF INSULIN (H): ICD-10-CM

## 2019-11-05 PROCEDURE — 99214 OFFICE O/P EST MOD 30 MIN: CPT | Performed by: PHYSICIAN ASSISTANT

## 2019-11-05 RX ORDER — METFORMIN HCL 500 MG
TABLET, EXTENDED RELEASE 24 HR ORAL
Qty: 360 TABLET | Refills: 1 | Status: SHIPPED | OUTPATIENT
Start: 2019-11-05 | End: 2020-05-04

## 2019-11-05 RX ORDER — LISINOPRIL AND HYDROCHLOROTHIAZIDE 20; 25 MG/1; MG/1
1 TABLET ORAL DAILY
Qty: 90 TABLET | Refills: 1 | Status: SHIPPED | OUTPATIENT
Start: 2019-11-05 | End: 2020-05-04

## 2019-11-05 RX ORDER — ROSUVASTATIN CALCIUM 5 MG/1
5 TABLET, COATED ORAL DAILY
Qty: 90 TABLET | Refills: 3 | Status: SHIPPED | OUTPATIENT
Start: 2019-11-05 | End: 2020-11-19

## 2019-11-05 RX ORDER — LIRAGLUTIDE 6 MG/ML
1.8 INJECTION SUBCUTANEOUS DAILY
Qty: 27 ML | Refills: 1 | Status: SHIPPED | OUTPATIENT
Start: 2019-11-05 | End: 2020-05-04

## 2019-11-05 ASSESSMENT — PAIN SCALES - GENERAL: PAINLEVEL: NO PAIN (0)

## 2019-11-05 NOTE — PROGRESS NOTES
Subjective     Amarilis Vazquez is a 59 year old female who presents to clinic today for the following health issues:      History of Present Illness        Diabetes:   She presents for follow up of diabetes.  She is checking home blood glucose one time daily. She checks blood glucose before meals.  Blood glucose is never over 200 and never under 70. When her blood glucose is low, the patient is asymptomatic for confusion, blurred vision, lethargy and reports not feeling dizzy, shaky, or weak.  She has no concerns regarding her diabetes at this time.  She is having numbness in feet. The patient has had a diabetic eye exam in the last 12 months. Eye exam performed on 1 20 2019.    Diabetes Management Resources     Hyperlipidemia Follow-Up      Are you having any of the following symptoms? (Select all that apply)  No complaints of shortness of breath, chest pain or pressure.  No increased sweating or nausea with activity.  No left-sided neck or arm pain.  No complaints of pain in calves when walking 1-2 blocks.    Are you regularly taking any medication or supplement to lower your cholesterol?   Yes- crestor every other day    Are you having muscle aches or other side effects that you think could be caused by your cholesterol lowering medication?  No    Hypertension Follow-up      Do you check your blood pressure regularly outside of the clinic? Yes     Are you following a low salt diet? Yes    Are your blood pressures ever more than 140 on the top number (systolic) OR more   than 90 on the bottom number (diastolic), for example 140/90? No      Patient Active Problem List   Diagnosis     HTN, goal below 140/90     Hypothyroidism     CAD (coronary artery disease)     Obesity     Coronary artery disease involving native coronary artery of native heart without angina pectoris     Mixed hyperlipidemia     Advanced directives, counseling/discussion     Type 2 diabetes mellitus with microalbuminuria, without long-term current  use of insulin (H)     Insomnia, unspecified type     Past Surgical History:   Procedure Laterality Date     D & C         Social History     Tobacco Use     Smoking status: Former Smoker     Packs/day: 1.00     Years: 20.00     Pack years: 20.00     Types: Cigarettes     Start date: 1976     Last attempt to quit: 2005     Years since quittin.8     Smokeless tobacco: Never Used     Tobacco comment: quit 8-9 years ago   Substance Use Topics     Alcohol use: Yes     Comment: seldom     Family History   Problem Relation Age of Onset     Cerebrovascular Disease Mother      Cancer Mother         lung     C.A.D. Mother         MI age 50?     C.A.D. Paternal Grandfather 50        MI     C.A.D. Maternal Grandmother      Diabetes Maternal Grandmother      Thyroid Disease Maternal Grandmother      C.A.D. Maternal Grandfather      Thyroid Disease Son      Breast Cancer No family hx of      Gynecology No family hx of         No ovarian, endometrial cancer     Osteoporosis No family hx of      Ovarian Cancer No family hx of      Cancer - colorectal No family hx of          Current Outpatient Medications   Medication Sig Dispense Refill     Alpha Lipoic Acid 200 MG CAPS        aspirin 81 MG tablet Take 1 tablet by mouth daily.       B Complex Vitamins (B COMPLEX PO) Super B complex       Coenzyme Q10 (CO Q 10 PO) Take  by mouth.       Continuous Blood Gluc  (FREESTYLE KANA 14 DAY READER) CRISTAL 1 Device continuous 1 Device 0     Continuous Blood Gluc Sensor (FREESTYLE KANA 14 DAY SENSOR) MISC 1 Box continuous 2 each 11     cyanocobalamin 1000 MCG SUBL sublingual tablet Place 1,000 mcg under the tongue daily       Fish Oil OIL 1,400 mg       Flaxseed, Linseed, (FLAXSEED OIL) 1000 MG CAPS Take  by mouth.       insulin pen needle 32G X 4 MM Use 1 pen needles daily or as directed. 100 each 5     levothyroxine (SYNTHROID/LEVOTHROID) 100 MCG tablet TAKE 1 TABLET BY MOUTH EVERY DAY. **APPT NEEDED FOR REFILLS**  90 tablet 3     liraglutide (VICTOZA PEN) 18 MG/3ML solution Inject 1.8 mg Subcutaneous daily 27 mL 1     lisinopril-hydrochlorothiazide (PRINZIDE/ZESTORETIC) 20-25 MG tablet Take 1 tablet by mouth daily 90 tablet 1     Magnesium 400 MG TABS        metFORMIN (GLUCOPHAGE-XR) 500 MG 24 hr tablet TAKE 2 TABLETS BY MOUTH TWICE A  tablet 1     Multiple Vitamins-Minerals (COMPLETE MULTIVITAMIN/MINERAL PO) Take  by mouth.       rosuvastatin (CRESTOR) 5 MG tablet Take 1 tablet (5 mg) by mouth daily 90 tablet 3     No Known Allergies  Recent Labs   Lab Test 10/28/19  0753 04/23/19  0809 09/04/18  0840 02/26/18  1006  07/21/15  0848  05/16/14  0844   A1C 7.4* 7.7* 8.1* 7.9*   < >  --    < >  --    * 110*  --  59   < >  --    < >  --    HDL 41* 37*  --  41*   < >  --    < >  --    TRIG 310* 217*  --  363*   < >  --    < >  --    ALT  --   --   --  46  --  36  --  30   CR 0.63 0.62  --  0.61   < > 0.65  --  0.75   GFRESTIMATED >90 >90  --  >90   < > >90  Non  GFR Calc    --  81   GFRESTBLACK >90 >90  --  >90   < > >90  African American GFR Calc    --  >90   POTASSIUM 4.2 3.8  --  4.3   < > 3.9  --  4.2   TSH  --  0.44  --  0.49   < > 8.96*  --  1.89    < > = values in this interval not displayed.      BP Readings from Last 3 Encounters:   11/05/19 134/82   10/01/19 (!) 152/82   04/30/19 136/88    Wt Readings from Last 3 Encounters:   11/05/19 82.6 kg (182 lb)   10/01/19 82.6 kg (182 lb)   04/30/19 81.2 kg (179 lb)                    Reviewed and updated as needed this visit by Provider         Review of Systems   ROS COMP: Constitutional, HEENT, cardiovascular, pulmonary, GI, , musculoskeletal, neuro, skin, endocrine and psych systems are negative, except as otherwise noted.      Objective    /82   Pulse 94   Temp 98.2  F (36.8  C) (Oral)   Wt 82.6 kg (182 lb)   SpO2 98%   BMI 30.29 kg/m    Body mass index is 30.29 kg/m .  Physical Exam   GENERAL: healthy, alert and no distress  RESP:  "lungs clear to auscultation - no rales, rhonchi or wheezes  CV: regular rate and rhythm, normal S1 S2, no S3 or S4, no murmur, click or rub, no peripheral edema and peripheral pulses strong  MS: no gross musculoskeletal defects noted, no edema  SKIN: no suspicious lesions or rashes  NEURO: Normal strength and tone, mentation intact and speech normal  PSYCH: mentation appears normal, affect normal/bright    Diagnostic Test Results:  Labs reviewed in Epic        Assessment & Plan     1. Type 2 diabetes mellitus with microalbuminuria, without long-term current use of insulin (H)  Stable condition.   She is making progress with her diet and exercise and A1C is down to 7.4%. She will continue with making healthy changes. This will also help with her triglycerides. Plan follow up in 6 months. She will need an A1C and thyroid check at that time.   - lisinopril-hydrochlorothiazide (PRINZIDE/ZESTORETIC) 20-25 MG tablet; Take 1 tablet by mouth daily  Dispense: 90 tablet; Refill: 1  - liraglutide (VICTOZA PEN) 18 MG/3ML solution; Inject 1.8 mg Subcutaneous daily  Dispense: 27 mL; Refill: 1  - metFORMIN (GLUCOPHAGE-XR) 500 MG 24 hr tablet; TAKE 2 TABLETS BY MOUTH TWICE A DAY  Dispense: 360 tablet; Refill: 1    2. Benign essential hypertension  Stable, refills given.   - lisinopril-hydrochlorothiazide (PRINZIDE/ZESTORETIC) 20-25 MG tablet; Take 1 tablet by mouth daily  Dispense: 90 tablet; Refill: 1    3. Mixed hyperlipidemia  Stable, refills given. She is tolerating alternative dosing.   - rosuvastatin (CRESTOR) 5 MG tablet; Take 1 tablet (5 mg) by mouth daily  Dispense: 90 tablet; Refill: 3     BMI:   Estimated body mass index is 30.29 kg/m  as calculated from the following:    Height as of 10/1/19: 1.651 m (5' 5\").    Weight as of this encounter: 82.6 kg (182 lb).   Weight management plan: Discussed healthy diet and exercise guidelines      Future lab tests ordered today.  She declines immunizations       Return in about 6 " months (around 5/5/2020) for diabetes / nonfasting lab.    Kristen M. Kehr, PA-C  [facility]

## 2019-11-05 NOTE — NURSING NOTE
"Chief Complaint   Patient presents with     Diabetes       Initial /82   Pulse 94   Temp 98.2  F (36.8  C) (Oral)   Wt 82.6 kg (182 lb)   SpO2 98%   BMI 30.29 kg/m   Estimated body mass index is 30.29 kg/m  as calculated from the following:    Height as of 10/1/19: 1.651 m (5' 5\").    Weight as of this encounter: 82.6 kg (182 lb).  Medication Reconciliation: complete    TIA Payne MA    "

## 2019-11-07 LAB — HEMOCCULT STL QL IA: NEGATIVE

## 2020-01-01 ENCOUNTER — TRANSFERRED RECORDS (OUTPATIENT)
Dept: HEALTH INFORMATION MANAGEMENT | Facility: CLINIC | Age: 60
End: 2020-01-01

## 2020-01-01 LAB — RETINOPATHY: NORMAL

## 2020-03-02 ENCOUNTER — HEALTH MAINTENANCE LETTER (OUTPATIENT)
Age: 60
End: 2020-03-02

## 2020-04-14 ENCOUNTER — DOCUMENTATION ONLY (OUTPATIENT)
Dept: LAB | Facility: CLINIC | Age: 60
End: 2020-04-14

## 2020-04-15 NOTE — TELEPHONE ENCOUNTER
Patient is scheduled with you for a Diabetic Follow up on 05/04/20 as well. Do you want to defer this appointment as well?  Please Advise.  CHARLIE Kaplan

## 2020-04-15 NOTE — PROGRESS NOTES
Patient has lab only appointment scheduled for 04.23.20.   Due to current coronavirus pandemic, please consider whether these lab tests can be deferred.     Please open ORDER REVIEW, review orders, and then confirm or defer orders ASAP.  Enter <dot>keep or <dot>defer smart phrase into NOTES, complete documentation, and route encounter.  [facility]: /team  [facility]: individual provider pool  [facility] primary care:   [facility] specialty: scheduling pool    Shanita Rodriguez on 4/14/2020 at 8:51 PM

## 2020-04-17 NOTE — TELEPHONE ENCOUNTER
DANYEL at [phone], 04/15/20, 04/17/20, NEEDS TELEPHONE VISIT-diabetes follow up, LAB apt cancelled per provider.  CHARLIE Kaplan

## 2020-04-19 DIAGNOSIS — E03.9 HYPOTHYROIDISM, UNSPECIFIED TYPE: ICD-10-CM

## 2020-04-20 RX ORDER — LEVOTHYROXINE SODIUM 100 UG/1
TABLET ORAL
Qty: 30 TABLET | Refills: 0 | Status: SHIPPED | OUTPATIENT
Start: 2020-04-20 | End: 2020-04-23

## 2020-04-20 NOTE — TELEPHONE ENCOUNTER
Patient has pending telephonl visit with K. Kehr 5/4/20.  She should have her labwork done prior to that appointment.  Labs were cancelled. In system.  Labs are futured in system already.  Please call and schedule lab appointment.  Natalia Mortensen RN

## 2020-04-23 ENCOUNTER — TELEPHONE (OUTPATIENT)
Dept: FAMILY MEDICINE | Facility: CLINIC | Age: 60
End: 2020-04-23

## 2020-04-23 DIAGNOSIS — E03.9 HYPOTHYROIDISM, UNSPECIFIED TYPE: ICD-10-CM

## 2020-04-23 RX ORDER — LEVOTHYROXINE SODIUM 100 UG/1
TABLET ORAL
Qty: 30 TABLET | Refills: 0 | Status: CANCELLED | OUTPATIENT
Start: 2020-04-23

## 2020-04-23 RX ORDER — LEVOTHYROXINE SODIUM 100 UG/1
TABLET ORAL
Qty: 90 TABLET | Refills: 0 | Status: SHIPPED | OUTPATIENT
Start: 2020-04-23 | End: 2020-07-22

## 2020-04-23 NOTE — TELEPHONE ENCOUNTER
I spoke to the patient and I read her the providers note as written.  The patient expressed understanding when told to keep her upcoming appointment.  Samantha Sanchez,

## 2020-04-23 NOTE — TELEPHONE ENCOUNTER
See telephone encounter dated 4/23/2020 addressing levothyroxine refill. Thank you. Jackie Almodovar R.N.

## 2020-04-23 NOTE — TELEPHONE ENCOUNTER
"Reason for Call:  Other call back    Detailed comments: patient is calling stating upset for one of 4 medications picked up today was not filled for 90 days as the rest of the 3 and only for 30 days. Stated drove a ways to  and does not to come back into town after 30 days. Patient did not know the name nor wanted to give it to , per patient: \"my provider should know shes the one who only filled it for 30 days, let her figure this out.\" Please call to discuss. Thank you.    Phone Number Patient can be reached at: Home number on file [phone] (home)    Best Time:     Can we leave a detailed message on this number? YES    Call taken on 4/23/2020 at 10:06 AM by Miley Jaramillo      "

## 2020-04-23 NOTE — TELEPHONE ENCOUNTER
Patient was notified that she was given the 30 days per protocol to get her to her visit on May 4th.  At that point in time the provider will address the refills.  Patient asked if she needed to tell the provider to refill or is that assumed from the visit.  I informed her that if she was concerned about this not happening the best thing is to give the provider a gentle reminder at the visit to refill her meidcations.  Thank you. Jackie Nishi HECK.          30 days refill done per protocol as she has an upcoming appointment.  The 9 # 90 refills are from previous refill that was on file done by the provider 11/5/2020.      04/20/20  Sent  (none)  Kat Mortensen, RN      levothyroxine (SYNTHROID/LEVOTHROID) 100 MCG tablet  30 tablet  0  4/20/2020      Pharmacy     [facility] [id number] [facility] - [place], [place] - [address]      Ordering  04/20/20 1846  Scope of Practice - No MD Sign  Kat Mortensen, RN      Next 5 appointments (look out 90 days)    May 04, 2020  4:20 PM CDT  Telephone Visit with Kristen M Kehr, PA-C  [facility] [id number] [address] [id number] [phone]

## 2020-04-23 NOTE — TELEPHONE ENCOUNTER
Reason for Call:  Other call back    Detailed comments: pharmacy is calling stating patient very upset, would like to request RX: levothyroxine (SYNTHROID/LEVOTHROID) 100 MCG tablet for 90 days instead of 30 days received. Please call to advise. Thank you.    Phone Number Patient can be reached at: [phone]    Best Time:     Can we leave a detailed message on this number? YES    Call taken on 4/23/2020 at 10:26 AM by Miley Jaramillo

## 2020-04-23 NOTE — TELEPHONE ENCOUNTER
Provider: I did explain to the patient (see below) that she was given 30 days to get her to her future 5/4/2020 visit and then refills will be addresses from there. Patient is upset and had the pharmacy call asking for a 90 day refill. We are not to give any alma refills during this time.  Are you willing to give a 90 day refill (Prescription(s) prompted)?  Thank you. Jackie Almodovar R.N.

## 2020-05-04 ENCOUNTER — VIRTUAL VISIT (OUTPATIENT)
Dept: FAMILY MEDICINE | Facility: CLINIC | Age: 60
End: 2020-05-04
Payer: COMMERCIAL

## 2020-05-04 DIAGNOSIS — E03.9 HYPOTHYROIDISM, UNSPECIFIED TYPE: Primary | ICD-10-CM

## 2020-05-04 DIAGNOSIS — I10 BENIGN ESSENTIAL HYPERTENSION: ICD-10-CM

## 2020-05-04 DIAGNOSIS — E11.29 TYPE 2 DIABETES MELLITUS WITH MICROALBUMINURIA, WITHOUT LONG-TERM CURRENT USE OF INSULIN (H): ICD-10-CM

## 2020-05-04 DIAGNOSIS — R80.9 TYPE 2 DIABETES MELLITUS WITH MICROALBUMINURIA, WITHOUT LONG-TERM CURRENT USE OF INSULIN (H): ICD-10-CM

## 2020-05-04 PROCEDURE — 99214 OFFICE O/P EST MOD 30 MIN: CPT | Mod: 95 | Performed by: PHYSICIAN ASSISTANT

## 2020-05-04 RX ORDER — METFORMIN HCL 500 MG
TABLET, EXTENDED RELEASE 24 HR ORAL
Qty: 360 TABLET | Refills: 0 | Status: SHIPPED | OUTPATIENT
Start: 2020-05-04 | End: 2020-10-16

## 2020-05-04 RX ORDER — LIRAGLUTIDE 6 MG/ML
1.8 INJECTION SUBCUTANEOUS DAILY
Qty: 27 ML | Refills: 0 | Status: SHIPPED | OUTPATIENT
Start: 2020-05-04 | End: 2020-10-16

## 2020-05-04 RX ORDER — LISINOPRIL AND HYDROCHLOROTHIAZIDE 20; 25 MG/1; MG/1
1 TABLET ORAL DAILY
Qty: 90 TABLET | Refills: 0 | Status: SHIPPED | OUTPATIENT
Start: 2020-05-04 | End: 2020-10-16

## 2020-05-04 NOTE — PROGRESS NOTES
"Amarilis Vazquez is a 60 year old female who is being evaluated via a billable telephone visit.      The patient has been notified of following:     \"This telephone visit will be conducted via a call between you and your physician/provider. We have found that certain health care needs can be provided without the need for a physical exam.  This service lets us provide the care you need with a short phone conversation.  If a prescription is necessary we can send it directly to your pharmacy.  If lab work is needed we can place an order for that and you can then stop by our lab to have the test done at a later time.    Telephone visits are billed at different rates depending on your insurance coverage. During this emergency period, for some insurers they may be billed the same as an in-person visit.  Please reach out to your insurance provider with any questions.    If during the course of the call the physician/provider feels a telephone visit is not appropriate, you will not be charged for this service.\"    Patient has given verbal consent for Telephone visit?  Yes    What phone number would you like to be contacted at? [phone]    How would you like to obtain your AVS? Mail a copy    Subjective     Amarilis Vazquez is a 60 year old female who presents to clinic today for the following health issues:    HPI  Diabetes Follow-up    How often are you checking your blood sugar? One time daily  What time of day are you checking your blood sugars (select all that apply)?  Before meals  Have you had any blood sugars above 200?  No  Have you had any blood sugars below 70?  No    What symptoms do you notice when your blood sugar is low?  None    What concerns do you have today about your diabetes? Other: couple of questions     Do you have any of these symptoms? (Select all that apply)  No numbness or tingling in feet.  No redness, sores or blisters on feet.  No complaints of excessive thirst.  No reports of blurry vision.  " No significant changes to weight.    Have you had a diabetic eye exam in the last 12 months? Yes- Date of last eye exam: 01/2020,  Location: [facility]        BP Readings from Last 2 Encounters:   11/05/19 134/82   10/01/19 (!) 152/82     Hemoglobin A1C (%)   Date Value   10/28/2019 7.4 (H)   04/23/2019 7.7 (H)     LDL Cholesterol Calculated (mg/dL)   Date Value   10/28/2019 101 (H)   04/23/2019 110 (H)                   How many days per week do you miss taking your medication? occasionally        Jyoti has been taking her blood sugars. They are in the 130-180 range. She has had more stress due to COVID and her new job. She is not working as many hours as she would like right now.       Patient Active Problem List   Diagnosis     HTN, goal below 140/90     Hypothyroidism     CAD (coronary artery disease)     Obesity     Coronary artery disease involving native coronary artery of native heart without angina pectoris     Mixed hyperlipidemia     Advanced directives, counseling/discussion     Type 2 diabetes mellitus with microalbuminuria, without long-term current use of insulin (H)     Insomnia, unspecified type     Past Surgical History:   Procedure Laterality Date     D & C  1979       Social History     Tobacco Use     Smoking status: Former Smoker     Packs/day: 1.00     Years: 20.00     Pack years: 20.00     Types: Cigarettes     Start date: 1/1/1976     Last attempt to quit: 1/1/2005     Years since quitting: 15.3     Smokeless tobacco: Never Used     Tobacco comment: quit 8-9 years ago   Substance Use Topics     Alcohol use: Yes     Comment: seldom     Family History   Problem Relation Age of Onset     Cerebrovascular Disease Mother      Cancer Mother         lung     C.A.D. Mother         MI age 50?     C.A.D. Paternal Grandfather 50        MI     C.A.D. Maternal Grandmother      Diabetes Maternal Grandmother      Thyroid Disease Maternal Grandmother      C.A.D. Maternal Grandfather      Thyroid Disease  Son      Breast Cancer No family hx of      Gynecology No family hx of         No ovarian, endometrial cancer     Osteoporosis No family hx of      Ovarian Cancer No family hx of      Cancer - colorectal No family hx of          Current Outpatient Medications   Medication Sig Dispense Refill     Alpha Lipoic Acid 200 MG CAPS        aspirin 81 MG tablet Take 1 tablet by mouth daily.       B Complex Vitamins (B COMPLEX PO) Super B complex       Coenzyme Q10 (CO Q 10 PO) Take  by mouth.       Continuous Blood Gluc  (FREESTYLE KANA 14 DAY READER) CRISTAL 1 Device continuous 1 Device 0     Continuous Blood Gluc Sensor (FREESTYLE KANA 14 DAY SENSOR) MISC 1 Box continuous 2 each 11     cyanocobalamin 1000 MCG SUBL sublingual tablet Place 1,000 mcg under the tongue daily       Fish Oil OIL 1,400 mg       Flaxseed, Linseed, (FLAXSEED OIL) 1000 MG CAPS Take  by mouth.       insulin pen needle 32G X 4 MM Use 1 pen needles daily or as directed. 100 each 5     levothyroxine (SYNTHROID/LEVOTHROID) 100 MCG tablet TAKE ONE TABLET ONCE DAILY 90 tablet 0     liraglutide (VICTOZA PEN) 18 MG/3ML solution Inject 1.8 mg Subcutaneous daily 27 mL 0     lisinopril-hydrochlorothiazide (ZESTORETIC) 20-25 MG tablet Take 1 tablet by mouth daily 90 tablet 0     Magnesium 400 MG TABS        metFORMIN (GLUCOPHAGE-XR) 500 MG 24 hr tablet TAKE 2 TABLETS BY MOUTH TWICE A  tablet 0     Multiple Vitamins-Minerals (COMPLETE MULTIVITAMIN/MINERAL PO) Take  by mouth.       rosuvastatin (CRESTOR) 5 MG tablet Take 1 tablet (5 mg) by mouth daily 90 tablet 3     TURMERIC PO        No Known Allergies  Recent Labs   Lab Test 10/28/19  0753 04/23/19  0809 09/04/18  0840 02/26/18  1006  07/21/15  0848  05/16/14  0844   A1C 7.4* 7.7* 8.1* 7.9*   < >  --    < >  --    * 110*  --  59   < >  --    < >  --    HDL 41* 37*  --  41*   < >  --    < >  --    TRIG 310* 217*  --  363*   < >  --    < >  --    ALT  --   --   --  46  --  36  --  30   CR 0.63  0.62  --  0.61   < > 0.65  --  0.75   GFRESTIMATED >90 >90  --  >90   < > >90  Non  GFR Calc    --  81   GFRESTBLACK >90 >90  --  >90   < > >90  African American GFR Calc    --  >90   POTASSIUM 4.2 3.8  --  4.3   < > 3.9  --  4.2   TSH  --  0.44  --  0.49   < > 8.96*  --  1.89    < > = values in this interval not displayed.      BP Readings from Last 3 Encounters:   11/05/19 134/82   10/01/19 (!) 152/82   04/30/19 136/88    Wt Readings from Last 3 Encounters:   11/05/19 82.6 kg (182 lb)   10/01/19 82.6 kg (182 lb)   04/30/19 81.2 kg (179 lb)                    Reviewed and updated as needed this visit by Provider  Tobacco  Allergies  Meds  Problems  Med Hx  Surg Hx  Fam Hx         Review of Systems   ROS COMP: Constitutional, HEENT, cardiovascular, pulmonary, GI, , musculoskeletal, neuro, skin, endocrine and psych systems are negative, except as otherwise noted.       Objective   Reported vitals:  There were no vitals taken for this visit.   healthy, alert and no distress  PSYCH: Alert and oriented times 3; coherent speech, normal   rate and volume, able to articulate logical thoughts, able   to abstract reason, no tangential thoughts, no hallucinations   or delusions  Her affect is normal  RESP: No cough, no audible wheezing, able to talk in full sentences  Remainder of exam unable to be completed due to telephone visits    Diagnostic Test Results:  Labs reviewed in Epic        Assessment/Plan:  1. Type 2 diabetes mellitus with microalbuminuria, without long-term current use of insulin (H)  Plan to refill medications x 3 months.   She will need lab tests again at that time along with an office visit.   Encouraged to work on eating healthy and routine exercise.   Discussed medication or addition of gabapentin for the neuropathy in her feet, she declines to add medication at this time. She also mentioned heel pain and briefly discussed plantar fasciitis. Encouraged good supportive shoes  daily.   - metFORMIN (GLUCOPHAGE-XR) 500 MG 24 hr tablet; TAKE 2 TABLETS BY MOUTH TWICE A DAY  Dispense: 360 tablet; Refill: 0  - lisinopril-hydrochlorothiazide (ZESTORETIC) 20-25 MG tablet; Take 1 tablet by mouth daily  Dispense: 90 tablet; Refill: 0  - liraglutide (VICTOZA PEN) 18 MG/3ML solution; Inject 1.8 mg Subcutaneous daily  Dispense: 27 mL; Refill: 0    2. Benign essential hypertension  Refill x 90 days.   - lisinopril-hydrochlorothiazide (ZESTORETIC) 20-25 MG tablet; Take 1 tablet by mouth daily  Dispense: 90 tablet; Refill: 0    Return in about 3 months (around 8/4/2020) for Lab Work, medication check.      Phone call duration:  12 minutes    Kristen M. Kehr, PA-C

## 2020-05-20 ENCOUNTER — TELEPHONE (OUTPATIENT)
Dept: AUDIOLOGY | Facility: CLINIC | Age: 60
End: 2020-05-20

## 2020-05-20 NOTE — TELEPHONE ENCOUNTER
Called patient back and talked to her about her options. I informed her that to get hearing aids she would need to start with a new hearing test as she was last tested over two years ago. She expressed frustration with not being able to get hearing aids now as she has changed jobs and is having more difficulty hearing at work. She reported that she had been wearing her dad's hearing aids, which were helpful, but she lost them. I informed her that we may be able to see some hearing aid patients starting in June, but that we would most likely start with patients who were already in the process of getting hearing aids. She expressed interest in buying hearing aids or amplifiers online, and I counseled her that they vary in quality, safety, and efficacy, but that a high quality personal sound amplifying product may be an option at this time. I also counseled her that there may be other clinics that have reopened more quickly if she wants to get new hearing aids as soon as possible. I encouraged her to call back in a week or two if she would like to be seen here, as we may have more information at that time.    Ru Hernandez, CCC-A  [place] Licensed Audiologist #[id number]

## 2020-07-06 ENCOUNTER — OFFICE VISIT (OUTPATIENT)
Dept: AUDIOLOGY | Facility: OTHER | Age: 60
End: 2020-07-06
Payer: COMMERCIAL

## 2020-07-06 DIAGNOSIS — H90.3 SENSORINEURAL HEARING LOSS, BILATERAL: Primary | ICD-10-CM

## 2020-07-06 PROCEDURE — 92550 TYMPANOMETRY & REFLEX THRESH: CPT | Performed by: AUDIOLOGIST

## 2020-07-06 PROCEDURE — 92591 HC HEARING AID EXAM BINAURAL: CPT | Performed by: AUDIOLOGIST

## 2020-07-06 PROCEDURE — 99207 ZZC NO CHARGE LOS: CPT | Performed by: AUDIOLOGIST

## 2020-07-06 PROCEDURE — 92557 COMPREHENSIVE HEARING TEST: CPT | Performed by: AUDIOLOGIST

## 2020-07-06 NOTE — PROGRESS NOTES
AUDIOLOGY REPORT    SUBJECTIVE:  Amarilis Vazquez is a 60 year old female who was seen in the Audiology Clinic at the [facility] for audiologic evaluation, referred by self. The patient has been seen previously at the [facility] for assessment on 4/2/2018 and results indicated mild to moderate essentially sensorineural hearing loss bilaterally. The patient reports that her hearing seems to be fairly stable, but she has a new job where it is much more difficult to hear. 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 She reports that she has been wearing her father's hearing aids, which seemed to help, but she lost them. The patient reports a history of three sets of PE tubes as a child. She reports a family history of hearing loss, including her father. The patient denies tinnitus, ear pain, pressure, and recent ear infections. The patient notes difficulty with communication in a variety of listening situations. The patient was unaccompanied to today's appointment.     OBJECTIVE:  Otoscopic exam indicates ears are clear of cerumen bilaterally     Pure Tone Thresholds assessed using conventional audiometry with good  reliability from 250-8000 Hz bilaterally using insert earphones and circumaural headphones     RIGHT:  mild to moderate essentially sensorineural hearing loss rising to normal hearing sensitivity at 8000 Hz    LEFT:    mild to moderately severe essentially sensorineural hearing loss with normal hearing sensitivity at 6000 Hz    Tympanogram:    RIGHT: normal eardrum mobility    LEFT:   negative pressure     Reflexes (reported by stimulus ear):  RIGHT: Ipsilateral is absent at frequencies tested  RIGHT: Contralateral is absent at frequencies tested  LEFT:   Ipsilateral is absent at frequencies tested  LEFT:   Contralateral is absent at frequencies tested    Speech Reception Threshold:    RIGHT: 25 dB HL    LEFT:   35 dB  HL    Word Recognition Score:     RIGHT: 100% at 85 dB HL using NU-6 recorded word list.    LEFT:   96% at 85 dB HL using NU-6 recorded word list.      ASSESSMENT:     ICD-10-CM    1. Sensorineural hearing loss, bilateral  H90.3 Cmprhn Audiometry Thrshld Eval & Speech Recog (74838)     Tymps / Reflex   ([id number])     Hearing Aid Exam, Binaural ([id number])       Compared to patient's previous audiogram dated 4/2/2018, hearing has essentially remained stable, with exception of a 10 dB better threshold at 8000 Hz in the right ear and 10 dB poorer threshold at 3000 Hz in the left ear today. Today s results were discussed with the patient in detail.     PLAN:  Patient was counseled regarding hearing loss and impact on communication. Patient is a good candidate for amplification at this time. The patient was given an [facility] hearing aid program information folder.     Patient is a hearing aid candidate. Patient would like to move forward with a hearing aid evaluation today to reduce the number of clinic visits needed. Insurance benefits were not verified prior to the appointment but the patient believes she has some hearing aid coverage.The patient was presented with different options for amplification to help aid in communication. Discussed styles, levels of technology and monaural vs. binaural fitting.    The hearing aid(s) mutually chosen were:  Binaural: Phonak Audeo M30-R  COLOR: P3 sandalwood  BATTERY SIZE: rechargeable  EARMOLD/TIPS: medium closed domes  CANAL/ LENGTH: 1    Reviewed purchase information and warranty information with patient. The 45 day trial period was explained to patient. The patient was given a copy of the [place] [employer] consumer brochure on purchasing hearing instruments. Patient risk factors have been provided to the patient in writing prior to the sale of the hearing aid per FDA regulation. The risk factors are also available in the User Instructional Booklet to be  presented on the day of the hearing aid fitting. Hearing aid(s) ordered. Hearing aid evaluation completed.    Amarilis is scheduled to return in 2-3 weeks for a hearing aid fitting and programming. Purchase agreement will be completed on that date. Please contact this clinic with any questions or concerns.      Ru Hernandez, CCC-A  [place] Licensed Audiologist #[id number]  7/6/2020

## 2020-07-20 ENCOUNTER — OFFICE VISIT (OUTPATIENT)
Dept: AUDIOLOGY | Facility: OTHER | Age: 60
End: 2020-07-20
Payer: COMMERCIAL

## 2020-07-20 DIAGNOSIS — H90.3 BILATERAL SENSORINEURAL HEARING LOSS: Primary | ICD-10-CM

## 2020-07-20 DIAGNOSIS — H90.3 SENSORINEURAL HEARING LOSS, BILATERAL: Primary | ICD-10-CM

## 2020-07-20 PROCEDURE — V5160 DISPENSING FEE BINAURAL: HCPCS | Performed by: AUDIOLOGIST

## 2020-07-20 PROCEDURE — V5011 HEARING AID FITTING/CHECKING: HCPCS | Mod: LT | Performed by: AUDIOLOGIST

## 2020-07-20 PROCEDURE — V5020 CONFORMITY EVALUATION: HCPCS | Mod: LT | Performed by: AUDIOLOGIST

## 2020-07-20 PROCEDURE — 92593 HC HEARING AID CHECK, BINAURAL: CPT | Performed by: AUDIOLOGIST

## 2020-07-20 PROCEDURE — V5011 HEARING AID FITTING/CHECKING: HCPCS | Mod: RT | Performed by: AUDIOLOGIST

## 2020-07-20 PROCEDURE — 99207 ZZC NO CHARGE LOS: CPT | Performed by: AUDIOLOGIST

## 2020-07-20 PROCEDURE — V5267 HEARING AID SUP/ACCESS/DEV: HCPCS | Performed by: AUDIOLOGIST

## 2020-07-20 PROCEDURE — V5020 CONFORMITY EVALUATION: HCPCS | Mod: RT | Performed by: AUDIOLOGIST

## 2020-07-20 PROCEDURE — V5261 HEARING AID, DIGIT, BIN, BTE: HCPCS | Performed by: AUDIOLOGIST

## 2020-07-20 NOTE — PROGRESS NOTES
AUDIOLOGY REPORT    SUBJECTIVE: Amarilis Vazquez, a 60 year old female, was seen in the Audiology Clinic at [facility] today for a Binaural hearing aid fitting. Previous results have revealed a bilateral sensorineural hearing loss.     OBJECTIVE:  Prior to fitting, a hearing aid check was performed to ensure device functionality. The hearing aid conformity evaluation was completed.The hearing aids were placed and they provided a good fit. Real-ear-probe-microphone measurements were completed on the [employer] system and were a good match to NAL-NL2 target with soft sounds audible, moderate sounds comfortable, and loud sounds below discomfort. UCLs are verified through maximum power output measures and demonstrate appropriate limiting of loud inputs. Ms. Vazquez was oriented to proper hearing aid use, care, cleaning (no water, dry brush), batteries (rechargeable;  use, low-battery signal), aid insertion/removal, user booklet, warranty information, storage cases, and other hearing aid details. The patient confirmed understanding of hearing aid use and care, and showed proper insertion of hearing aid and batteries while in the office today. Ms. Vazquez reported good volume and sound quality today.    Following real-ear testing overall gain was reduced to 90% for patient comfort.    EAR(S) FIT: Binaural  MA HEARING AID MAKE: Right: Phonak; Left: Phonak  MA HEARING AID MODEL #: Right: Audeo M30-R; Left: Audeo M30-R  HEARING AID STYLE: Right: ROBEL; Left: ROBEL  DOME SIZE: Right:  medium closed; Left::  medium closed   LENGTH: Right:  1M; Left:  1M  SERIAL NUMBERS: Right: [id number]; Left: [id number]  WARRANTY END DATE: Right: 10/3/2022; Left:: 10/3/2022      CHARGES:   Hearing Aid Check: Binaural, 08140, $81.00  Dispensing Fee: Binaural, , $500.00  Fit/Orientation: Binaural, , $370.00  Hearing Aid Conformity Evaluation: 2, , $174.00  Hearing Aid Digital: Binaural, BTE, ,  $1275.00  Total: $2400.00       ASSESSMENT: Binaural hearing aid fitting completed today. Verification measures were performed. The 45 day trial period was explained to patient, and they expressed understanding. Ms. Vazquez signed the Hearing Aid Purchase Agreement and was given a copy, as well as details on her hearing aids. Patient was counseled that exact out of pocket amounts cannot be determined for hearing aid claims being sent to insurance. Any insurance coverage information presented to the patient is an estimate only, and is not a guarantee of payment. Patient has been advised to check with their own insurance.    The patient's insurance company was called during the appointment today and we were told that she has up to $2500 of coverage every three years.    PLAN: Ms. Vazquez will return for follow-up in 2-3 weeks for a hearing aid review appointment. Please call this clinic with questions regarding today s appointment.    Ru Hernandez, CCC-A  [place] Licensed Audiologist #[id number]  7/20/2020

## 2020-07-20 NOTE — PATIENT INSTRUCTIONS

## 2020-07-20 NOTE — PROGRESS NOTES
Patient was fit with "[employer]"eCubicl M30-R rechargeable hearing aids today and purchased the , which will be billed directly to the patient.    CHARGES   Hearing aid supply (hearing aid , $200)    Ru Hernandez, CCC-A  [place] Licensed Audiologist #[id number]  7/20/2020

## 2020-07-22 DIAGNOSIS — E03.9 HYPOTHYROIDISM, UNSPECIFIED TYPE: ICD-10-CM

## 2020-07-22 RX ORDER — LEVOTHYROXINE SODIUM 100 UG/1
TABLET ORAL
Qty: 90 TABLET | Refills: 0 | OUTPATIENT
Start: 2020-07-22

## 2020-07-22 RX ORDER — LEVOTHYROXINE SODIUM 100 UG/1
TABLET ORAL
Qty: 90 TABLET | Refills: 3 | Status: SHIPPED | OUTPATIENT
Start: 2020-07-22 | End: 2020-11-19

## 2020-07-22 NOTE — TELEPHONE ENCOUNTER
TSH   Date Value Ref Range Status   04/23/2019 0.44 0.40 - 4.00 mU/L Final     T4 Free   Date Value Ref Range Status   01/09/2018 1.43 0.76 - 1.46 ng/dL Final     Per last Virtual  visit 5/4/20, patient had prescriptions filled x 3 mos and was advise will need appointment with labs for further refills.  No pending appointment.  Please advise on refill.  Rosa Rodriguez RN

## 2020-08-21 ENCOUNTER — NURSE TRIAGE (OUTPATIENT)
Dept: NURSING | Facility: CLINIC | Age: 60
End: 2020-08-21

## 2020-08-21 ENCOUNTER — HOSPITAL ENCOUNTER (EMERGENCY)
Facility: CLINIC | Age: 60
Discharge: HOME | End: 2020-08-21
Attending: EMERGENCY MEDICINE | Admitting: EMERGENCY MEDICINE
Payer: COMMERCIAL

## 2020-08-21 VITALS
DIASTOLIC BLOOD PRESSURE: 83 MMHG | BODY MASS INDEX: 29.49 KG/M2 | OXYGEN SATURATION: 97 % | RESPIRATION RATE: 20 BRPM | HEIGHT: 65 IN | WEIGHT: 177 LBS | SYSTOLIC BLOOD PRESSURE: 122 MMHG | TEMPERATURE: 97.8 F | HEART RATE: 85 BPM

## 2020-08-21 DIAGNOSIS — R42 VERTIGO: ICD-10-CM

## 2020-08-21 LAB
ANION GAP SERPL CALCULATED.3IONS-SCNC: 6 MMOL/L (ref 3–14)
BASOPHILS # BLD AUTO: 0.1 10E9/L (ref 0–0.2)
BASOPHILS NFR BLD AUTO: 0.5 %
BUN SERPL-MCNC: 14 MG/DL (ref 7–30)
CALCIUM SERPL-MCNC: 9.2 MG/DL (ref 8.5–10.1)
CHLORIDE SERPL-SCNC: 99 MMOL/L (ref 94–109)
CO2 SERPL-SCNC: 28 MMOL/L (ref 20–32)
CREAT SERPL-MCNC: 0.56 MG/DL (ref 0.52–1.04)
DIFFERENTIAL METHOD BLD: ABNORMAL
EOSINOPHIL # BLD AUTO: 0.2 10E9/L (ref 0–0.7)
EOSINOPHIL NFR BLD AUTO: 1.6 %
ERYTHROCYTE [DISTWIDTH] IN BLOOD BY AUTOMATED COUNT: 13.5 % (ref 10–15)
GFR SERPL CREATININE-BSD FRML MDRD: >90 ML/MIN/{1.73_M2}
GLUCOSE SERPL-MCNC: 252 MG/DL (ref 70–99)
HCT VFR BLD AUTO: 43.2 % (ref 35–47)
HGB BLD-MCNC: 14.3 G/DL (ref 11.7–15.7)
IMM GRANULOCYTES # BLD: 0 10E9/L (ref 0–0.4)
IMM GRANULOCYTES NFR BLD: 0.2 %
LYMPHOCYTES # BLD AUTO: 1.3 10E9/L (ref 0.8–5.3)
LYMPHOCYTES NFR BLD AUTO: 12.2 %
MCH RBC QN AUTO: 29.8 PG (ref 26.5–33)
MCHC RBC AUTO-ENTMCNC: 33.1 G/DL (ref 31.5–36.5)
MCV RBC AUTO: 90 FL (ref 78–100)
MONOCYTES # BLD AUTO: 0.4 10E9/L (ref 0–1.3)
MONOCYTES NFR BLD AUTO: 3.5 %
NEUTROPHILS # BLD AUTO: 8.5 10E9/L (ref 1.6–8.3)
NEUTROPHILS NFR BLD AUTO: 82 %
NRBC # BLD AUTO: 0 10*3/UL
NRBC BLD AUTO-RTO: 0 /100
PLATELET # BLD AUTO: 285 10E9/L (ref 150–450)
POTASSIUM SERPL-SCNC: 4.1 MMOL/L (ref 3.4–5.3)
RBC # BLD AUTO: 4.8 10E12/L (ref 3.8–5.2)
SODIUM SERPL-SCNC: 133 MMOL/L (ref 133–144)
TROPONIN I SERPL-MCNC: <0.015 UG/L (ref 0–0.04)
WBC # BLD AUTO: 10.4 10E9/L (ref 4–11)

## 2020-08-21 PROCEDURE — 96374 THER/PROPH/DIAG INJ IV PUSH: CPT

## 2020-08-21 PROCEDURE — 99284 EMERGENCY DEPT VISIT MOD MDM: CPT

## 2020-08-21 PROCEDURE — 25000128 H RX IP 250 OP 636: Performed by: EMERGENCY MEDICINE

## 2020-08-21 PROCEDURE — 93005 ELECTROCARDIOGRAM TRACING: CPT

## 2020-08-21 PROCEDURE — 84484 ASSAY OF TROPONIN QUANT: CPT | Performed by: EMERGENCY MEDICINE

## 2020-08-21 PROCEDURE — 85025 COMPLETE CBC W/AUTO DIFF WBC: CPT | Performed by: EMERGENCY MEDICINE

## 2020-08-21 PROCEDURE — 25000132 ZZH RX MED GY IP 250 OP 250 PS 637: Performed by: EMERGENCY MEDICINE

## 2020-08-21 PROCEDURE — 80048 BASIC METABOLIC PNL TOTAL CA: CPT | Performed by: EMERGENCY MEDICINE

## 2020-08-21 RX ORDER — MECLIZINE HYDROCHLORIDE 25 MG/1
25 TABLET ORAL ONCE
Status: COMPLETED | OUTPATIENT
Start: 2020-08-21 | End: 2020-08-21

## 2020-08-21 RX ORDER — ONDANSETRON 2 MG/ML
4 INJECTION INTRAMUSCULAR; INTRAVENOUS ONCE
Status: COMPLETED | OUTPATIENT
Start: 2020-08-21 | End: 2020-08-21

## 2020-08-21 RX ORDER — MECLIZINE HYDROCHLORIDE 25 MG/1
25 TABLET ORAL 3 TIMES DAILY PRN
Qty: 30 TABLET | Refills: 0 | Status: SHIPPED | OUTPATIENT
Start: 2020-08-21 | End: 2020-11-19

## 2020-08-21 RX ORDER — ONDANSETRON 4 MG/1
4 TABLET, ORALLY DISINTEGRATING ORAL EVERY 6 HOURS PRN
Qty: 10 TABLET | Refills: 0 | Status: SHIPPED | OUTPATIENT
Start: 2020-08-21 | End: 2020-08-24

## 2020-08-21 RX ADMIN — MECLIZINE HYDROCHLORIDE 25 MG: 25 TABLET ORAL at 09:56

## 2020-08-21 RX ADMIN — ONDANSETRON 4 MG: 2 INJECTION INTRAMUSCULAR; INTRAVENOUS at 08:49

## 2020-08-21 ASSESSMENT — ENCOUNTER SYMPTOMS
FEVER: 0
CHILLS: 0
NAUSEA: 1
NUMBNESS: 0
WEAKNESS: 0
VOMITING: 1
LIGHT-HEADEDNESS: 1
HEADACHES: 0
SHORTNESS OF BREATH: 0
COUGH: 0
DIZZINESS: 1
ABDOMINAL PAIN: 0

## 2020-08-21 ASSESSMENT — MIFFLIN-ST. JEOR: SCORE: 1378.75

## 2020-08-21 NOTE — ED AVS SNAPSHOT
Emergency Department  [address] [place] [id number]  Phone:  [phone]  Fax:  [phone]                                    Amarilis Vazquez   MRN: [id number]    Department:   Emergency Department   Date of Visit:  8/21/2020           After Visit Summary Signature Page    I have received my discharge instructions, and my questions have been answered. I have discussed any challenges I see with this plan with the nurse or doctor.    ..........................................................................................................................................  Patient/Patient Representative Signature      ..........................................................................................................................................  Patient Representative Print Name and Relationship to Patient    ..................................................               ................................................  Date                                   Time    ..........................................................................................................................................  Reviewed by Signature/Title    ...................................................              ..............................................  Date                                               Time          22EPIC Rev 08/18

## 2020-08-21 NOTE — ED TRIAGE NOTES
Pt awoke at 0430 not feeling well - checked blood pressure 170/110 feeling nauseated and dizzy - denies HA denies chest pain - vomited enroute to ER

## 2020-08-21 NOTE — TELEPHONE ENCOUNTER
174/110 blood pressure heart rate 90. Took blood pressure again after taking b/p med 162/93. She feels like she's unsteady on her feet and has dizziness. She said she feels cold.  Her  will bring her to the ER.  Sherine Rodgers RN  [facility]      Reason for Disposition    [1] Systolic BP  >= 160 OR Diastolic >= 100 AND [2] cardiac or neurologic symptoms (e.g., chest pain, difficulty breathing, unsteady gait, blurred vision)    Additional Information    Negative: Difficult to awaken or acting confused (e.g., disoriented, slurred speech)    Negative: Severe difficulty breathing (e.g., struggling for each breath, speaks in single words)    Negative: [1] Weakness of the face, arm or leg on one side of the body AND [2] new onset    Negative: [1] Numbness (i.e., loss of sensation) of the face, arm or leg on one side of the body AND [2] new onset    Negative: [1] Chest pain lasts > 5 minutes AND [2] history of heart disease  (i.e., heart attack, bypass surgery, angina, angioplasty, CHF)    Negative: [1] Chest pain AND [2] took nitrogylcerin AND [3] pain was not relieved    Negative: Sounds like a life-threatening emergency to the triager    Negative: Symptom is main concern  (e.g., headache, chest pain)    Negative: Low blood pressure is main concern    Protocols used: HIGH BLOOD PRESSURE-A-

## 2020-08-21 NOTE — ED PROVIDER NOTES
"History     Chief Complaint:  Hypertension    The history is provided by the patient and the spouse.     Amarilis Vazquez is a 60 year old year old female with a history of CAD, type II diabetes, hyperlipidemia, hypertension, and hypothyroidism who presents with her  for evaluation of hypertension and dizziness. About 4 hours prior to arrival, the patient woke up at her usual time and immediately did not feel right upon moving her head. She states she felt dizzy and like her \"equilibrium was not right.\" She states she also feels quite off balanced when walking but notes that her symptoms improve when she is not moving her head. Upon getting out of bed this morning, the patient took her blood pressure, which she usually does not check very often. At that time, it was found to be 174/110. She then took her morning dose of Lisinopril which caused her blood pressures to decrease. Patient also developed nausea and vomited a couple of times since this all started. Patient notes she has not experienced anything similar to this in the past.     Here, the patient again got up to walk and still did not feel right and felt dizzy. Patient denies any chest pain, shortness of breath, fever, rash, cough, or numbness or weakness in her extremities.     Allergies:  No Known Allergies    Medications:   Aspirin 81 mg  Levothyroxine  Lisinopril-hydrochlorothiazide  Metformin  Crestor     Medical History:   CAD  Type II diabetes  Hypertension  Hypothyroidism   Insomnia  Hyperlipidemia  Obesity    Surgical History   D&C    Family History:   CVA  Lung cancer  CAD: Mother   Thyroid disease    Social History:  Presents to the ED with her .  Tobacco Use: Former - Quit date: 2005  Alcohol Use: Positive  Drug Use: Negative    Review of Systems   Constitutional: Negative for chills and fever.   Respiratory: Negative for cough and shortness of breath.    Cardiovascular: Negative for chest pain.   Gastrointestinal: Positive for nausea " "and vomiting. Negative for abdominal pain.   Skin: Negative for rash.   Neurological: Positive for dizziness and light-headedness. Negative for weakness, numbness and headaches.   All other systems reviewed and are negative.      Physical Exam     Patient Vitals for the past 24 hrs:   BP Temp Temp src Pulse Resp SpO2 Height Weight   08/21/20 1040 122/83 -- -- 85 -- -- -- --   08/21/20 1020 120/77 -- -- 85 -- -- -- --   08/21/20 1000 123/80 -- -- 84 -- -- -- --   08/21/20 0950 125/82 -- -- 83 -- -- -- --   08/21/20 0949 125/82 -- -- 83 -- -- -- --   08/21/20 0754 (!) 164/81 97.8  F (36.6  C) Oral 92 20 97 % 1.651 m (5' 5\") 80.3 kg (177 lb)       Physical Exam  General: Alert and cooperative with exam. Patient in mild distress. Normal mentation.  Head:  Scalp is NC/AT  Eyes:  No scleral icterus, PERRL, EOMI   ENT:  The external nose and ears are normal. The oropharynx is normal and without erythema; mucus membranes are moist.  Neck:  Normal range of motion without rigidity.  CV:  Regular rate and rhythm  Resp:  Breath sounds are clear bilaterally    Non-labored, no retractions or accessory muscle use  GI:  Abdomen is soft, no distension, no tenderness. No peritoneal signs  MS:  No lower extremity edema   Skin:  Warm and dry, No rash or lesions noted.  Neuro: Oriented x 3. No gross motor deficits.    Strength and sensation grossly intact in all 4 extremities.      Cranial nerves 2-12 intact.    GCS: 15     Normal finger to nose and heel to shin testing     Gait normal    Left beating nystagmus present, positive head impulse test, negative skew      Emergency Department Course     ECG:  Indication: Hypertension and Dizziness  Time: 0859  Vent. Rate 80 bpm. VT interval 154. QRS duration 82. QT/QTc 398/459. P-R-T axis 46 23 60. Normal sinus rhythm. Normal ECG. Read time: 0900    Laboratory:  Laboratory findings were communicated with the patient and family who voiced understanding of the findings.    CBC: WBC: 10.4, HGB: " 14.3, PLT: 285  BMP: Glucose: 252 (H), o/w WNL (Creatinine: 0.56)    0844 Troponin: <0.015    Interventions:  0849 Zofran 4 mg IV  0956 Meclizine 25 mg PO    Emergency Department Course:  Past medical records, nursing notes, and vitals reviewed.    8:57 AM I performed an exam of the patient as documented above.     EKG obtained in the ED, see results above.   IV was inserted and blood was drawn for laboratory testing, results above.    0945 I rechecked the patient and discussed the results of her workup thus far.     1015 I rechecked the patient and discussed the results of her workup thus far.     Findings and plan explained to the Patient and spouse. Patient discharged home with instructions regarding supportive care, medications, and reasons to return. The importance of close follow-up was reviewed. The patient was prescribed Meclizine and Zofran.    I personally reviewed the laboratory results with the Patient and spouse and answered all related questions prior to discharge.     Impression & Plan     Medical Decision Making:  Amarilis Vazquez is a 60 year old female who presents for evaluation of vertigo. The differential diagnosis of vertigo is broad and includes common etiologies such as meniere's disease, labyrinthitis, benign positional vertigo, otitis media, etc.  More serious etiologies considered include central etiologies such as tumor, intracerebral bleed, dissection, ischemic cerebral vascular accident.  The history, physical exam including detailed neurologic exam, and workup in the emergency room suggests a benign cause of vertigo today.  Patient feels improved after interventions noted above. Further outpatient management is indicated with vertigo medications.  Outpatient referral to vestibular rehab placed.    No indication for advanced imaging at this point (CT/MRI) as there are no definite signs of a central and concerning etiology for the vertigo.      Vertigo precautions given for home.       Diagnosis:    ICD-10-CM    1. Vertigo  R42 PHYSICAL THERAPY REFERRAL       Disposition:  Discharged to home.    Discharge Medications:  New Prescriptions    MECLIZINE (ANTIVERT) 25 MG TABLET    Take 1 tablet (25 mg) by mouth 3 times daily as needed for dizziness    ONDANSETRON (ZOFRAN ODT) 4 MG ODT TAB    Take 1 tablet (4 mg) by mouth every 6 hours as needed for nausea or vomiting       Scribe Disclosure:  Mercedes CRANDALL, am serving as a scribe on 8/21/2020 at 9:04 AM to personally document services performed by Cj Brown DO, based on my observations and the provider's statements to me.      Cj Brown DO  08/21/20 2422

## 2020-10-16 DIAGNOSIS — I10 BENIGN ESSENTIAL HYPERTENSION: ICD-10-CM

## 2020-10-16 DIAGNOSIS — R80.9 TYPE 2 DIABETES MELLITUS WITH MICROALBUMINURIA, WITHOUT LONG-TERM CURRENT USE OF INSULIN (H): ICD-10-CM

## 2020-10-16 DIAGNOSIS — E11.29 TYPE 2 DIABETES MELLITUS WITH MICROALBUMINURIA, WITHOUT LONG-TERM CURRENT USE OF INSULIN (H): ICD-10-CM

## 2020-10-16 RX ORDER — METFORMIN HCL 500 MG
TABLET, EXTENDED RELEASE 24 HR ORAL
Qty: 120 TABLET | Refills: 0 | Status: SHIPPED | OUTPATIENT
Start: 2020-10-16 | End: 2020-11-19

## 2020-10-16 RX ORDER — LIRAGLUTIDE 6 MG/ML
INJECTION SUBCUTANEOUS
Qty: 9 ML | Refills: 0 | Status: SHIPPED | OUTPATIENT
Start: 2020-10-16 | End: 2020-11-30

## 2020-10-16 RX ORDER — LISINOPRIL AND HYDROCHLOROTHIAZIDE 20; 25 MG/1; MG/1
TABLET ORAL
Qty: 30 TABLET | Refills: 0 | Status: SHIPPED | OUTPATIENT
Start: 2020-10-16 | End: 2020-11-19

## 2020-10-16 NOTE — LETTER
October 16, 2020    Amarilis Vazquez  [address] [place] [address]    Dear Amarilis,       We recently received a refill request for VICTOZA PEN 18 MG/3ML soln, lisinopril-hydrochlorothiazide (ZESTORETIC) 20-25 MG tablet, metFORMIN (GLUCOPHAGE-XR) 500 MG 24 hr tablet.  We have refilled this for a one time supply only because you are due for a:    Diabetic/hypertension office visit      Please call at your earliest convenience so that there will not be a delay with your future refills.          Thank you,   Your [facility] Team/sp  [phone]

## 2020-10-16 NOTE — LETTER
October 16, 2020    Amarilis Vazquez  [address] [place] [address]    Dear Amarilis,       We recently received a refill request for lisinopril-hydrochlorothiazide, victoza and metformin.  We have refilled this for a one time 30 day supply only because you are due for a:    Diabetes office visit      Please call at your earliest convenience so that there will not be a delay with your future refills.          Thank you,   Your [facility] Team/  [phone]

## 2020-10-16 NOTE — TELEPHONE ENCOUNTER
"Routing refill request to provider for review/approval because:  Failed protocol.  Please advise. Thank you. Jackie Almodovar R.N.    Requested Prescriptions   Pending Prescriptions Disp Refills    VICTOZA PEN 18 MG/3ML soln [Pharmacy Med Name: VICTOZA 3-JELENA 18 MG/3 ML PEN]  0     Sig: INJECT 1.8 MG UNDER THE SKIN ONCE DAILY       GLP-1 Agonists Protocol Failed - 10/16/2020 12:10 AM        Failed - HgbA1C in past 3 or 6 months     If HgbA1C is 8 or greater, it needs to be on file within the past 3 months.  If less than 8, must be on file within the past 6 months.     Recent Labs   Lab Test 10/28/19  0753   A1C 7.4*             Passed - Medication is active on med list        Passed - Patient is age 18 or older        Passed - No active pregnancy on record        Passed - Normal serum creatinine on file in past 12 months     Recent Labs   Lab Test 08/21/20  0844   CR 0.56       Ok to refill medication if creatinine is low          Passed - No positive pregnancy test in past 12 months        Passed - Recent (6 mo) or future (30 days) visit within the authorizing provider's specialty     Patient had office visit in the last 6 months or has a visit in the next 30 days with authorizing provider.  See \"Patient Info\" tab in inbasket, or \"Choose Columns\" in Meds & Orders section of the refill encounter.              lisinopril-hydrochlorothiazide (ZESTORETIC) 20-25 MG tablet [Pharmacy Med Name: LISINOPRIL-HCTZ 20-25 MG TAB] 90 tablet 0     Sig: TAKE 1 TABLET BY MOUTH EVERY DAY       Diuretics (Including Combos) Protocol Passed - 10/16/2020 12:10 AM        Passed - Blood pressure under 140/90 in past 12 months     BP Readings from Last 3 Encounters:   08/21/20 122/83   11/05/19 134/82   10/01/19 (!) 152/82                 Passed - Recent (12 mo) or future (30 days) visit within the authorizing provider's specialty     Patient has had an office visit with the authorizing provider or a provider within the authorizing providers " "department within the previous 12 mos or has a future within next 30 days. See \"Patient Info\" tab in inbasket, or \"Choose Columns\" in Meds & Orders section of the refill encounter.              Passed - Medication is active on med list        Passed - Patient is age 18 or older        Passed - No active pregancy on record        Passed - Normal serum creatinine on file in past 12 months     Recent Labs   Lab Test 08/21/20  0844   CR 0.56              Passed - Normal serum potassium on file in past 12 months     Recent Labs   Lab Test 08/21/20  0844   POTASSIUM 4.1                    Passed - Normal serum sodium on file in past 12 months     Recent Labs   Lab Test 08/21/20  0844                 Passed - No positive pregnancy test in past 12 months       ACE Inhibitors (Including Combos) Protocol Passed - 10/16/2020 12:10 AM        Passed - Blood pressure under 140/90 in past 12 months     BP Readings from Last 3 Encounters:   08/21/20 122/83   11/05/19 134/82   10/01/19 (!) 152/82                 Passed - Recent (12 mo) or future (30 days) visit within the authorizing provider's specialty     Patient has had an office visit with the authorizing provider or a provider within the authorizing providers department within the previous 12 mos or has a future within next 30 days. See \"Patient Info\" tab in inbasket, or \"Choose Columns\" in Meds & Orders section of the refill encounter.              Passed - Medication is active on med list        Passed - Patient is age 18 or older        Passed - No active pregnancy on record        Passed - Normal serum creatinine on file in past 12 months     Recent Labs   Lab Test 08/21/20  0844   CR 0.56       Ok to refill medication if creatinine is low          Passed - Normal serum potassium on file in past 12 months     Recent Labs   Lab Test 08/21/20  0844   POTASSIUM 4.1             Passed - No positive pregnancy test within past 12 months          metFORMIN (GLUCOPHAGE-XR) " "500 MG 24 hr tablet [Pharmacy Med Name: METFORMIN HCL  MG TABLET] 360 tablet 0     Sig: TAKE 2 TABLETS BY MOUTH TWICE A DAY       Biguanide Agents Failed - 10/16/2020 12:10 AM        Failed - Patient has documented A1c within the specified period of time.     If HgbA1C is 8 or greater, it needs to be on file within the past 3 months.  If less than 8, must be on file within the past 6 months.     Recent Labs   Lab Test 10/28/19  0753   A1C 7.4*             Passed - Patient is age 10 or older        Passed - Patient's CR is NOT>1.4 OR Patient's EGFR is NOT<45 within past 12 mos.     Recent Labs   Lab Test 08/21/20  0844   GFRESTIMATED >90   GFRESTBLACK >90       Recent Labs   Lab Test 08/21/20  0844   CR 0.56             Passed - Patient does NOT have a diagnosis of CHF.        Passed - Medication is active on med list        Passed - Patient is not pregnant        Passed - Patient has not had a positive pregnancy test within the past 12 mos.         Passed - Recent (6 mo) or future (30 days) visit within the authorizing provider's specialty     Patient had office visit in the last 6 months or has a visit in the next 30 days with authorizing provider or within the authorizing provider's specialty.  See \"Patient Info\" tab in inbasket, or \"Choose Columns\" in Meds & Orders section of the refill encounter.                       "

## 2020-10-29 ENCOUNTER — TELEPHONE (OUTPATIENT)
Dept: FAMILY MEDICINE | Facility: CLINIC | Age: 60
End: 2020-10-29

## 2020-10-29 NOTE — TELEPHONE ENCOUNTER
Reason for Call:  Other call back    Detailed comments: pt would like a call back.  Should she fast for appt. Call and discuss    Phone Number Patient can be reached at: Cell number on file:    Telephone Information:   Mobile [phone]       Best Time: any    Can we leave a detailed message on this number? YES    Call taken on 10/29/2020 at 9:34 AM by Mary Woody

## 2020-10-29 NOTE — TELEPHONE ENCOUNTER
: Please review for any labs that are due for the 11/19/20 appointment and notify pt if any fasting labs are due. Route to provider if clarification is needed of labs due at 11/19/20 appointment.    Nanette Wise, DORONN, RN

## 2020-10-30 NOTE — TELEPHONE ENCOUNTER
Called and informed patient that she is due to have her cholesterol drawn, and to please come in fasting to her upcoming appointment.Amanda Adan MA/CHARLIE

## 2020-11-19 ENCOUNTER — TRANSFERRED RECORDS (OUTPATIENT)
Dept: HEALTH INFORMATION MANAGEMENT | Facility: CLINIC | Age: 60
End: 2020-11-19

## 2020-11-19 ENCOUNTER — OFFICE VISIT (OUTPATIENT)
Dept: FAMILY MEDICINE | Facility: CLINIC | Age: 60
End: 2020-11-19
Payer: COMMERCIAL

## 2020-11-19 VITALS
BODY MASS INDEX: 29.62 KG/M2 | OXYGEN SATURATION: 98 % | HEART RATE: 86 BPM | TEMPERATURE: 98.5 F | SYSTOLIC BLOOD PRESSURE: 136 MMHG | WEIGHT: 178 LBS | DIASTOLIC BLOOD PRESSURE: 80 MMHG

## 2020-11-19 DIAGNOSIS — E11.29 TYPE 2 DIABETES MELLITUS WITH MICROALBUMINURIA, WITHOUT LONG-TERM CURRENT USE OF INSULIN (H): Primary | ICD-10-CM

## 2020-11-19 DIAGNOSIS — I25.10 CORONARY ARTERY DISEASE INVOLVING NATIVE CORONARY ARTERY OF NATIVE HEART WITHOUT ANGINA PECTORIS: ICD-10-CM

## 2020-11-19 DIAGNOSIS — R80.9 TYPE 2 DIABETES MELLITUS WITH MICROALBUMINURIA, WITHOUT LONG-TERM CURRENT USE OF INSULIN (H): Primary | ICD-10-CM

## 2020-11-19 DIAGNOSIS — I10 BENIGN ESSENTIAL HYPERTENSION: ICD-10-CM

## 2020-11-19 DIAGNOSIS — E03.9 HYPOTHYROIDISM, UNSPECIFIED TYPE: ICD-10-CM

## 2020-11-19 DIAGNOSIS — E78.2 MIXED HYPERLIPIDEMIA: ICD-10-CM

## 2020-11-19 LAB
ALBUMIN SERPL-MCNC: 4.1 G/DL (ref 3.4–5)
ALP SERPL-CCNC: 70 U/L (ref 40–150)
ALT SERPL W P-5'-P-CCNC: 36 U/L (ref 0–50)
ANION GAP SERPL CALCULATED.3IONS-SCNC: 6 MMOL/L (ref 3–14)
AST SERPL W P-5'-P-CCNC: 17 U/L (ref 0–45)
BILIRUB SERPL-MCNC: 0.9 MG/DL (ref 0.2–1.3)
BUN SERPL-MCNC: 13 MG/DL (ref 7–30)
CALCIUM SERPL-MCNC: 9 MG/DL (ref 8.5–10.1)
CHLORIDE SERPL-SCNC: 98 MMOL/L (ref 94–109)
CHOLEST SERPL-MCNC: 280 MG/DL
CO2 SERPL-SCNC: 31 MMOL/L (ref 20–32)
CREAT SERPL-MCNC: 0.63 MG/DL (ref 0.52–1.04)
CREAT UR-MCNC: 21 MG/DL
GFR SERPL CREATININE-BSD FRML MDRD: >90 ML/MIN/{1.73_M2}
GLUCOSE SERPL-MCNC: 150 MG/DL (ref 70–99)
HBA1C MFR BLD: 7.3 % (ref 0–5.6)
HDLC SERPL-MCNC: 40 MG/DL
LDLC SERPL CALC-MCNC: ABNORMAL MG/DL
LDLC SERPL DIRECT ASSAY-MCNC: 159 MG/DL
MICROALBUMIN UR-MCNC: 10 MG/L
MICROALBUMIN/CREAT UR: 45.37 MG/G CR (ref 0–25)
NONHDLC SERPL-MCNC: 240 MG/DL
POTASSIUM SERPL-SCNC: 4.2 MMOL/L (ref 3.4–5.3)
PROT SERPL-MCNC: 8 G/DL (ref 6.8–8.8)
SODIUM SERPL-SCNC: 135 MMOL/L (ref 133–144)
TRIGL SERPL-MCNC: 430 MG/DL
TSH SERPL DL<=0.005 MIU/L-ACNC: 1.02 MU/L (ref 0.4–4)

## 2020-11-19 PROCEDURE — 82043 UR ALBUMIN QUANTITATIVE: CPT | Performed by: PHYSICIAN ASSISTANT

## 2020-11-19 PROCEDURE — 84443 ASSAY THYROID STIM HORMONE: CPT | Performed by: PHYSICIAN ASSISTANT

## 2020-11-19 PROCEDURE — 99207 PR FOOT EXAM NO CHARGE: CPT | Performed by: PHYSICIAN ASSISTANT

## 2020-11-19 PROCEDURE — 80061 LIPID PANEL: CPT | Performed by: PHYSICIAN ASSISTANT

## 2020-11-19 PROCEDURE — 99214 OFFICE O/P EST MOD 30 MIN: CPT | Performed by: PHYSICIAN ASSISTANT

## 2020-11-19 PROCEDURE — 36415 COLL VENOUS BLD VENIPUNCTURE: CPT | Performed by: PHYSICIAN ASSISTANT

## 2020-11-19 PROCEDURE — 83721 ASSAY OF BLOOD LIPOPROTEIN: CPT | Performed by: PHYSICIAN ASSISTANT

## 2020-11-19 PROCEDURE — 80053 COMPREHEN METABOLIC PANEL: CPT | Performed by: PHYSICIAN ASSISTANT

## 2020-11-19 PROCEDURE — 83036 HEMOGLOBIN GLYCOSYLATED A1C: CPT | Performed by: PHYSICIAN ASSISTANT

## 2020-11-19 RX ORDER — LISINOPRIL AND HYDROCHLOROTHIAZIDE 20; 25 MG/1; MG/1
1 TABLET ORAL DAILY
Qty: 90 TABLET | Refills: 1 | Status: SHIPPED | OUTPATIENT
Start: 2020-11-19 | End: 2021-05-24

## 2020-11-19 RX ORDER — ROSUVASTATIN CALCIUM 5 MG/1
5 TABLET, COATED ORAL DAILY
Qty: 90 TABLET | Refills: 3 | Status: SHIPPED | OUTPATIENT
Start: 2020-11-19 | End: 2021-06-01

## 2020-11-19 RX ORDER — LEVOTHYROXINE SODIUM 100 UG/1
100 TABLET ORAL DAILY
Qty: 90 TABLET | Refills: 3 | Status: SHIPPED | OUTPATIENT
Start: 2020-11-19 | End: 2021-06-01

## 2020-11-19 RX ORDER — METFORMIN HCL 500 MG
TABLET, EXTENDED RELEASE 24 HR ORAL
Qty: 360 TABLET | Refills: 1 | Status: SHIPPED | OUTPATIENT
Start: 2020-11-19 | End: 2021-05-24

## 2020-11-19 ASSESSMENT — PAIN SCALES - GENERAL: PAINLEVEL: NO PAIN (0)

## 2020-11-19 NOTE — PROGRESS NOTES
Subjective     Amarilis Vazquez is a 60 year old female who presents to clinic today for the following health issues:    History of Present Illness       Diabetes:   She presents for follow up of diabetes.  She is checking home blood glucose one time daily. She checks blood glucose before meals.  Blood glucose is never over 200 and never under 70. When her blood glucose is low, the patient is asymptomatic for confusion, blurred vision, lethargy and reports not feeling dizzy, shaky, or weak.  She has no concerns regarding her diabetes at this time.  She is having numbness in feet.         Hypertension: She presents for follow up of hypertension.  She does not check blood pressure  regularly outside of the clinic. Outside blood pressures have been over 140/90. She follows a low salt diet.     Hypothyroidism:     Since last visit, patient describes the following symptoms::  Anxiety and Fatigue    She eats 2-3 servings of fruits and vegetables daily.She consumes 0 sweetened beverage(s) daily.She exercises with enough effort to increase her heart rate 10 to 19 minutes per day.  She exercises with enough effort to increase her heart rate 3 or less days per week. She is missing 1 dose(s) of medications per week.  She is not taking prescribed medications regularly due to remembering to take.                 Review of Systems   Constitutional, HEENT, cardiovascular, pulmonary, GI, , musculoskeletal, neuro, skin, endocrine and psych systems are negative, except as otherwise noted.      Objective    /80   Pulse 86   Temp 98.5  F (36.9  C) (Tympanic)   Wt 80.7 kg (178 lb)   SpO2 98%   BMI 29.62 kg/m    Body mass index is 29.62 kg/m .  Physical Exam   GENERAL: healthy, alert and no distress  RESP: lungs clear to auscultation - no rales, rhonchi or wheezes  CV: regular rate and rhythm, normal S1 S2, no S3 or S4, no murmur, click or rub, no peripheral edema and peripheral pulses strong  ABDOMEN: soft, nontender, no  "hepatosplenomegaly, no masses and bowel sounds normal  MS: no gross musculoskeletal defects noted, no edema  SKIN: no suspicious lesions or rashes  NEURO: Normal strength and tone, mentation intact and speech normal  PSYCH: mentation appears normal, affect normal/bright  Diabetic foot exam: normal DP and PT pulses, no trophic changes or ulcerative lesions, normal sensory exam and normal monofilament exam    Results for orders placed or performed in visit on 11/19/20   **A1C FUTURE 3mo     Status: Abnormal   Result Value Ref Range    Hemoglobin A1C 7.3 (H) 0 - 5.6 %           Assessment & Plan     Type 2 diabetes mellitus with microalbuminuria, without long-term current use of insulin (H)  A1C is done today in the office and she is doing well.   Reviewed lifestyle modifications and that will help to prevent needing insulin in the future. Goal of A1C to be under 8.   The remaining results will be back within the next 24 hours  I will send a refill of her medications as soon as all results are back.   Plan follow up in 6 months.   - Albumin Random Urine Quantitative with Creat Ratio  - **Comprehensive metabolic panel FUTURE anytime  - **A1C FUTURE 3mo  - Lipid panel reflex to direct LDL Fasting  - FOOT EXAM    Hypothyroidism, unspecified type  Thyroid checked today and refill after results are back.   - **TSH with free T4 reflex FUTURE anytime    Benign essential hypertension  Stable, blood pressure is at goal on her current medications.   - **Comprehensive metabolic panel FUTURE anytime    Coronary artery disease involving native coronary artery of native heart without angina pectoris  No symptoms, lipids checked today. Medications will be refilled as soon as tests are back.        BMI:   Estimated body mass index is 29.62 kg/m  as calculated from the following:    Height as of 8/21/20: 1.651 m (5' 5\").    Weight as of this encounter: 80.7 kg (178 lb).   Weight management plan: Discussed healthy diet and exercise " guidelines             Return in about 6 months (around 5/19/2021) for diabetes.    Kristen M. Kehr, PA-C  [facility]

## 2020-11-19 NOTE — NURSING NOTE
"Chief Complaint   Patient presents with     Diabetes     Hypertension     Thyroid Disease       Initial BP (!) 158/82   Pulse 86   Temp 98.5  F (36.9  C) (Tympanic)   Wt 80.7 kg (178 lb)   SpO2 98%   BMI 29.62 kg/m   Estimated body mass index is 29.62 kg/m  as calculated from the following:    Height as of 8/21/20: 1.651 m (5' 5\").    Weight as of this encounter: 80.7 kg (178 lb).  Medication Reconciliation: complete    TIA Payne MA    "

## 2020-11-27 DIAGNOSIS — R80.9 TYPE 2 DIABETES MELLITUS WITH MICROALBUMINURIA, WITHOUT LONG-TERM CURRENT USE OF INSULIN (H): ICD-10-CM

## 2020-11-27 DIAGNOSIS — E11.29 TYPE 2 DIABETES MELLITUS WITH MICROALBUMINURIA, WITHOUT LONG-TERM CURRENT USE OF INSULIN (H): ICD-10-CM

## 2020-11-27 RX ORDER — LIRAGLUTIDE 6 MG/ML
INJECTION SUBCUTANEOUS
Status: CANCELLED | OUTPATIENT
Start: 2020-11-27

## 2020-11-30 RX ORDER — LIRAGLUTIDE 6 MG/ML
INJECTION SUBCUTANEOUS
Qty: 3 ML | Refills: 11 | Status: SHIPPED | OUTPATIENT
Start: 2020-11-30 | End: 2021-06-01

## 2020-11-30 NOTE — TELEPHONE ENCOUNTER
Routing refill request to provider for review/approval because:  Tamar given x1 and patient did not follow up, please advise  Rosa Rodriguez RN

## 2020-12-14 ENCOUNTER — HEALTH MAINTENANCE LETTER (OUTPATIENT)
Age: 60
End: 2020-12-14

## 2021-01-04 ENCOUNTER — TRANSFERRED RECORDS (OUTPATIENT)
Dept: HEALTH INFORMATION MANAGEMENT | Facility: CLINIC | Age: 61
End: 2021-01-04

## 2021-01-04 LAB — RETINOPATHY: NEGATIVE

## 2021-04-08 ENCOUNTER — MYC MEDICAL ADVICE (OUTPATIENT)
Dept: FAMILY MEDICINE | Facility: CLINIC | Age: 61
End: 2021-04-08

## 2021-04-08 DIAGNOSIS — I25.10 CORONARY ARTERY DISEASE INVOLVING NATIVE CORONARY ARTERY OF NATIVE HEART WITHOUT ANGINA PECTORIS: ICD-10-CM

## 2021-04-08 DIAGNOSIS — E11.29 TYPE 2 DIABETES MELLITUS WITH MICROALBUMINURIA, WITHOUT LONG-TERM CURRENT USE OF INSULIN (H): Primary | ICD-10-CM

## 2021-04-08 DIAGNOSIS — I10 HTN, GOAL BELOW 140/90: ICD-10-CM

## 2021-04-08 DIAGNOSIS — E78.2 MIXED HYPERLIPIDEMIA: ICD-10-CM

## 2021-04-08 DIAGNOSIS — R80.9 TYPE 2 DIABETES MELLITUS WITH MICROALBUMINURIA, WITHOUT LONG-TERM CURRENT USE OF INSULIN (H): Primary | ICD-10-CM

## 2021-04-08 NOTE — TELEPHONE ENCOUNTER
Next 5 appointments (look out 90 days)    May 06, 2021  7:40 AM  Office Visit with Kristen M Kehr, PA-C [facility] ([facility] ) [address] [facility] [address]  [phone]          Mildred SALN, RN

## 2021-04-18 ENCOUNTER — HEALTH MAINTENANCE LETTER (OUTPATIENT)
Age: 61
End: 2021-04-18

## 2021-05-02 ENCOUNTER — MYC MEDICAL ADVICE (OUTPATIENT)
Dept: FAMILY MEDICINE | Facility: CLINIC | Age: 61
End: 2021-05-02

## 2021-05-17 ENCOUNTER — MYC MEDICAL ADVICE (OUTPATIENT)
Dept: FAMILY MEDICINE | Facility: CLINIC | Age: 61
End: 2021-05-17

## 2021-05-17 NOTE — TELEPHONE ENCOUNTER
Pt would like a call back.  She still has questions regarding labs.  She wants to confirm that the current labs in chart for K. Kehr are the same for the lab mentioned in her [employer] message.  She is frustrated that a visit is required.  She is currently scheduled on 5/27/21.  She does not want 2 blood draws so is hoping the labs can be placed for 5/27/21 visit.  I said it can be discussed at visit but pt was addiment that she speaks to someone on the care team.

## 2021-05-17 NOTE — TELEPHONE ENCOUNTER
"Called and spoke to patient. She had several questions as to if Amanda would order the tests, would her insurance cover them? Or should she spend over $200 to have her blood work in [place], where the provider suggested that she go. She wants to know if her insurance will cover the tests, I told her that she would need to ask her insurance company. She said that she does not know all of the medical terms I am using and that makes her feel stupid. I informed her that Amanda will need a diagnosis code for each test, like, what symptom is she having, that that test needs to be ordered. Then she wants to know if Amanda will be able to interpret the test results, I said that I was not sure. Patient then wanted me to make a note in her chart, to have only \"experinced\" people in the lab draw her blood. I said I am not sure that there is a place to note that. She said there is always someone new in the lab. She said when she comes in and they do not get her blood, she will get up an walk out.Amanda Adan MA/CHARLIE    "

## 2021-05-23 DIAGNOSIS — I10 BENIGN ESSENTIAL HYPERTENSION: ICD-10-CM

## 2021-05-23 DIAGNOSIS — R80.9 TYPE 2 DIABETES MELLITUS WITH MICROALBUMINURIA, WITHOUT LONG-TERM CURRENT USE OF INSULIN (H): ICD-10-CM

## 2021-05-23 DIAGNOSIS — E11.29 TYPE 2 DIABETES MELLITUS WITH MICROALBUMINURIA, WITHOUT LONG-TERM CURRENT USE OF INSULIN (H): ICD-10-CM

## 2021-05-24 RX ORDER — METFORMIN HCL 500 MG
TABLET, EXTENDED RELEASE 24 HR ORAL
Qty: 360 TABLET | Refills: 0 | Status: SHIPPED | OUTPATIENT
Start: 2021-05-24 | End: 2021-06-01

## 2021-05-24 RX ORDER — LISINOPRIL AND HYDROCHLOROTHIAZIDE 20; 25 MG/1; MG/1
TABLET ORAL
Qty: 90 TABLET | Refills: 1 | Status: SHIPPED | OUTPATIENT
Start: 2021-05-24 | End: 2021-06-01

## 2021-05-24 NOTE — TELEPHONE ENCOUNTER
Next 5 appointments (look out 90 days)    May 27, 2021  7:40 AM  Office Visit with Kristen M Kehr, PA-C [facility] ([facility] ) [address] [facility] [address] [phone]        Routing refill request to provider for review/approval because:  Labs not current:  hgb a1c  Mary Addison BSN, RN

## 2021-05-27 ENCOUNTER — OFFICE VISIT (OUTPATIENT)
Dept: FAMILY MEDICINE | Facility: CLINIC | Age: 61
End: 2021-05-27
Payer: COMMERCIAL

## 2021-05-27 VITALS
OXYGEN SATURATION: 100 % | SYSTOLIC BLOOD PRESSURE: 134 MMHG | DIASTOLIC BLOOD PRESSURE: 84 MMHG | BODY MASS INDEX: 29.16 KG/M2 | HEIGHT: 65 IN | TEMPERATURE: 97.5 F | HEART RATE: 82 BPM | WEIGHT: 175 LBS

## 2021-05-27 DIAGNOSIS — E11.29 TYPE 2 DIABETES MELLITUS WITH MICROALBUMINURIA, WITHOUT LONG-TERM CURRENT USE OF INSULIN (H): Primary | ICD-10-CM

## 2021-05-27 DIAGNOSIS — Z12.11 COLON CANCER SCREENING: ICD-10-CM

## 2021-05-27 DIAGNOSIS — E03.9 HYPOTHYROIDISM, UNSPECIFIED TYPE: ICD-10-CM

## 2021-05-27 DIAGNOSIS — I10 BENIGN ESSENTIAL HYPERTENSION: ICD-10-CM

## 2021-05-27 DIAGNOSIS — I10 HTN, GOAL BELOW 140/90: ICD-10-CM

## 2021-05-27 DIAGNOSIS — R80.9 TYPE 2 DIABETES MELLITUS WITH MICROALBUMINURIA, WITHOUT LONG-TERM CURRENT USE OF INSULIN (H): Primary | ICD-10-CM

## 2021-05-27 DIAGNOSIS — E78.2 MIXED HYPERLIPIDEMIA: ICD-10-CM

## 2021-05-27 DIAGNOSIS — I25.10 CORONARY ARTERY DISEASE INVOLVING NATIVE CORONARY ARTERY OF NATIVE HEART WITHOUT ANGINA PECTORIS: ICD-10-CM

## 2021-05-27 LAB
ALBUMIN SERPL-MCNC: 4.3 G/DL (ref 3.4–5)
ALP SERPL-CCNC: 69 U/L (ref 40–150)
ALT SERPL W P-5'-P-CCNC: 32 U/L (ref 0–50)
ANION GAP SERPL CALCULATED.3IONS-SCNC: 5 MMOL/L (ref 3–14)
AST SERPL W P-5'-P-CCNC: 16 U/L (ref 0–45)
BILIRUB SERPL-MCNC: 1.4 MG/DL (ref 0.2–1.3)
BUN SERPL-MCNC: 12 MG/DL (ref 7–30)
CALCIUM SERPL-MCNC: 9.6 MG/DL (ref 8.5–10.1)
CHLORIDE SERPL-SCNC: 98 MMOL/L (ref 94–109)
CO2 SERPL-SCNC: 31 MMOL/L (ref 20–32)
CREAT SERPL-MCNC: 0.66 MG/DL (ref 0.52–1.04)
CREAT UR-MCNC: 30 MG/DL
DEPRECATED CALCIDIOL+CALCIFEROL SERPL-MC: 37 UG/L (ref 20–75)
GFR SERPL CREATININE-BSD FRML MDRD: >90 ML/MIN/{1.73_M2}
GLUCOSE SERPL-MCNC: 140 MG/DL (ref 70–99)
HBA1C MFR BLD: 7.4 % (ref 0–5.6)
MICROALBUMIN UR-MCNC: 8 MG/L
MICROALBUMIN/CREAT UR: 25.22 MG/G CR (ref 0–25)
POTASSIUM SERPL-SCNC: 3.9 MMOL/L (ref 3.4–5.3)
PROT SERPL-MCNC: 8.3 G/DL (ref 6.8–8.8)
SODIUM SERPL-SCNC: 134 MMOL/L (ref 133–144)
T4 FREE SERPL-MCNC: 1.31 NG/DL (ref 0.76–1.46)
TSH SERPL DL<=0.005 MIU/L-ACNC: 0.29 MU/L (ref 0.4–4)
VIT B12 SERPL-MCNC: 974 PG/ML (ref 193–986)

## 2021-05-27 PROCEDURE — 82306 VITAMIN D 25 HYDROXY: CPT | Performed by: PHYSICIAN ASSISTANT

## 2021-05-27 PROCEDURE — 82607 VITAMIN B-12: CPT | Performed by: PHYSICIAN ASSISTANT

## 2021-05-27 PROCEDURE — 83036 HEMOGLOBIN GLYCOSYLATED A1C: CPT | Performed by: PHYSICIAN ASSISTANT

## 2021-05-27 PROCEDURE — 99207 PR FOOT EXAM NO CHARGE: CPT | Mod: 25 | Performed by: PHYSICIAN ASSISTANT

## 2021-05-27 PROCEDURE — 36415 COLL VENOUS BLD VENIPUNCTURE: CPT | Performed by: PHYSICIAN ASSISTANT

## 2021-05-27 PROCEDURE — 80053 COMPREHEN METABOLIC PANEL: CPT | Performed by: PHYSICIAN ASSISTANT

## 2021-05-27 PROCEDURE — 99214 OFFICE O/P EST MOD 30 MIN: CPT | Performed by: PHYSICIAN ASSISTANT

## 2021-05-27 PROCEDURE — 84439 ASSAY OF FREE THYROXINE: CPT | Performed by: PHYSICIAN ASSISTANT

## 2021-05-27 PROCEDURE — 82043 UR ALBUMIN QUANTITATIVE: CPT | Performed by: PHYSICIAN ASSISTANT

## 2021-05-27 PROCEDURE — 84443 ASSAY THYROID STIM HORMONE: CPT | Performed by: PHYSICIAN ASSISTANT

## 2021-05-27 ASSESSMENT — MIFFLIN-ST. JEOR: SCORE: 1368.63

## 2021-05-27 ASSESSMENT — PAIN SCALES - GENERAL: PAINLEVEL: NO PAIN (0)

## 2021-05-27 NOTE — PROGRESS NOTES
"    Assessment & Plan     1. Type 2 diabetes mellitus with microalbuminuria, without long-term current use of insulin (H)  2. Mixed hyperlipidemia  3. HTN, goal below 140/90  4. Coronary artery disease involving native coronary artery of native heart without angina pectoris  5. Hypothyroidism, unspecified type    She is fasting today and will get the following tests completed.   After results are back, refills will be sent and adjustments made with medication dosing if needed.   She has plans to work on diet and exercise and is going to be working with [facility] in [place].   She has had some weight loss already and making progress.     - Albumin Random Urine Quantitative with Creat Ratio  - **TSH with free T4 reflex FUTURE anytime  - **A1C FUTURE anytime  - **Comprehensive metabolic panel FUTURE anytime  - **Vitamin D Deficiency FUTURE anytime  - **Vitamin B12 FUTURE 2mo    Health Maintenance reviewed. She is due for immunizations, but declines shingles, COVID19 vaccine and Tdap vaccine for now.   Plan to discuss at her next appointment in 6 months again.              BMI:   Estimated body mass index is 28.9 kg/m  as calculated from the following:    Height as of this encounter: 1.657 m (5' 5.25\").    Weight as of this encounter: 79.4 kg (175 lb).   Weight management plan: Discussed healthy diet and exercise guidelines        Return in about 6 months (around 11/27/2021) for medication check.    Kristen M. Kehr, PA-C [facility]   Amarilis is a 61 year old who presents for the following health issues     History of Present Illness       Diabetes:   She presents for follow up of diabetes.  She is checking home blood glucose one time daily. She checks blood glucose before meals.  Blood glucose is never over 200 and never under 70. When her blood glucose is low, the patient is asymptomatic for confusion, blurred vision, lethargy and reports not feeling dizzy, " "shaky, or weak.  She has no concerns regarding her diabetes at this time.  She is having numbness in feet.         Hyperlipidemia:  She presents for follow up of hyperlipidemia.  She is taking medication to lower cholesterol. She is not having myalgia or other side effects to statin medications.    Hypertension: She presents for follow up of hypertension.  She does check blood pressure  regularly outside of the clinic. Outside blood pressures have been over 140/90. She follows a low salt diet.     She eats 2-3 servings of fruits and vegetables daily.She consumes 0 sweetened beverage(s) daily.She exercises with enough effort to increase her heart rate 30 to 60 minutes per day.  She exercises with enough effort to increase her heart rate 5 days per week. She is missing 1 dose(s) of medications per week.         Hypothyroid: she continues on the levothyroxine. She is working with a natural specialist with diet and exercise.       Review of Systems   Constitutional, HEENT, cardiovascular, pulmonary, GI, , musculoskeletal, neuro, skin, endocrine and psych systems are negative, except as otherwise noted.    She has noticed a diminished sensation in her feet bilaterally. No pain. She is trying to wear supportive shoes and keeping sugars well controlled.       Objective    /84   Pulse 82   Temp 97.5  F (36.4  C) (Tympanic)   Ht 1.657 m (5' 5.25\")   Wt 79.4 kg (175 lb)   SpO2 100%   BMI 28.90 kg/m    Body mass index is 28.9 kg/m .  Physical Exam   GENERAL: healthy, alert and no distress  NECK: no adenopathy, no asymmetry, masses, or scars and thyroid normal to palpation  RESP: lungs clear to auscultation - no rales, rhonchi or wheezes  CV: regular rate and rhythm, normal S1 S2, no S3 or S4, no murmur, click or rub, no peripheral edema and peripheral pulses strong  MS: no gross musculoskeletal defects noted, no edema  SKIN: no suspicious lesions or rashes  PSYCH: mentation appears normal, affect " normal/bright  Diabetic foot exam: normal DP and PT pulses, no trophic changes or ulcerative lesions, normal sensory exam and normal monofilament exam

## 2021-05-27 NOTE — NURSING NOTE
"Chief Complaint   Patient presents with     Diabetes     Hypertension     Lipids       Initial /84   Pulse 82   Temp 97.5  F (36.4  C) (Tympanic)   Ht 1.657 m (5' 5.25\")   Wt 79.4 kg (175 lb)   SpO2 100%   BMI 28.90 kg/m   Estimated body mass index is 28.9 kg/m  as calculated from the following:    Height as of this encounter: 1.657 m (5' 5.25\").    Weight as of this encounter: 79.4 kg (175 lb).  Medication Reconciliation: complete    TIA Payne MA    "

## 2021-05-27 NOTE — PATIENT INSTRUCTIONS
I will have your lab tests within the next 24-48 hours. Medication refills and adjustments will be sent after all results are back.

## 2021-06-01 ENCOUNTER — MYC MEDICAL ADVICE (OUTPATIENT)
Dept: FAMILY MEDICINE | Facility: CLINIC | Age: 61
End: 2021-06-01

## 2021-06-01 DIAGNOSIS — Z12.11 COLON CANCER SCREENING: ICD-10-CM

## 2021-06-01 PROCEDURE — 82274 ASSAY TEST FOR BLOOD FECAL: CPT | Performed by: PHYSICIAN ASSISTANT

## 2021-06-01 RX ORDER — ROSUVASTATIN CALCIUM 5 MG/1
5 TABLET, COATED ORAL DAILY
Qty: 90 TABLET | Refills: 3 | Status: SHIPPED | OUTPATIENT
Start: 2021-06-01 | End: 2022-03-18

## 2021-06-01 RX ORDER — LIRAGLUTIDE 6 MG/ML
INJECTION SUBCUTANEOUS
Qty: 3 ML | Refills: 5 | Status: SHIPPED | OUTPATIENT
Start: 2021-06-01 | End: 2021-11-18

## 2021-06-01 RX ORDER — LEVOTHYROXINE SODIUM 100 UG/1
100 TABLET ORAL DAILY
Qty: 90 TABLET | Refills: 3 | Status: SHIPPED | OUTPATIENT
Start: 2021-06-01 | End: 2022-04-07

## 2021-06-01 RX ORDER — METFORMIN HCL 500 MG
TABLET, EXTENDED RELEASE 24 HR ORAL
Qty: 360 TABLET | Refills: 1 | Status: SHIPPED | OUTPATIENT
Start: 2021-06-01 | End: 2021-11-18

## 2021-06-01 RX ORDER — LISINOPRIL AND HYDROCHLOROTHIAZIDE 20; 25 MG/1; MG/1
1 TABLET ORAL DAILY
Qty: 90 TABLET | Refills: 1 | Status: SHIPPED | OUTPATIENT
Start: 2021-06-01 | End: 2021-11-18

## 2021-06-06 ENCOUNTER — MYC MEDICAL ADVICE (OUTPATIENT)
Dept: FAMILY MEDICINE | Facility: CLINIC | Age: 61
End: 2021-06-06

## 2021-06-06 LAB — HEMOCCULT STL QL IA: NEGATIVE

## 2021-06-08 NOTE — TELEPHONE ENCOUNTER
Lipid panel was not checked, was checked in November 2020, generally only checked once per year.  Could be checked at next follow-up.  Arely Romero, CNP

## 2021-06-09 ENCOUNTER — OFFICE VISIT (OUTPATIENT)
Dept: FAMILY MEDICINE | Facility: CLINIC | Age: 61
End: 2021-06-09
Payer: COMMERCIAL

## 2021-06-09 ENCOUNTER — NURSE TRIAGE (OUTPATIENT)
Dept: NURSING | Facility: CLINIC | Age: 61
End: 2021-06-09

## 2021-06-09 VITALS
WEIGHT: 173.8 LBS | TEMPERATURE: 98.9 F | OXYGEN SATURATION: 99 % | SYSTOLIC BLOOD PRESSURE: 192 MMHG | HEART RATE: 93 BPM | DIASTOLIC BLOOD PRESSURE: 100 MMHG | BODY MASS INDEX: 28.7 KG/M2

## 2021-06-09 DIAGNOSIS — L25.9 CONTACT DERMATITIS, UNSPECIFIED CONTACT DERMATITIS TYPE, UNSPECIFIED TRIGGER: Primary | ICD-10-CM

## 2021-06-09 PROCEDURE — 99213 OFFICE O/P EST LOW 20 MIN: CPT | Performed by: NURSE PRACTITIONER

## 2021-06-09 RX ORDER — PREDNISONE 20 MG/1
TABLET ORAL
Qty: 20 TABLET | Refills: 0 | Status: SHIPPED | OUTPATIENT
Start: 2021-06-09 | End: 2022-03-18

## 2021-06-09 ASSESSMENT — ENCOUNTER SYMPTOMS
SHORTNESS OF BREATH: 0
FEVER: 0
TROUBLE SWALLOWING: 0
PALPITATIONS: 0
COUGH: 0
CHILLS: 0
SORE THROAT: 0
WHEEZING: 0
CHEST TIGHTNESS: 0

## 2021-06-09 NOTE — PATIENT INSTRUCTIONS
"  Patient Education     Contact Dermatitis  Contact dermatitis is a skin rash caused by something that touches the skin and makes it irritated and inflamed. Your skin may be red, swollen, dry, and may be cracked. Blisters may form and ooze. The rash will itch.   Contact dermatitis often forms on the face and neck, backs of hands, forearms, genitals, and lower legs. But it can affect any area.   People can get contact dermatitis from lots of sources. These include:    Plants such as poison ivy, oak, or sumac    Chemicals in hair dyes and rinses, soaps, solvents, waxes, fingernail polish, and deodorants     Jewelry or watchbands made of nickel or cobalt  Contact dermatitis is not passed from person to person.  Talk with your healthcare provider about what may have caused the rash. A type of allergy testing called \"patch testing\" may be used to discover what you are allergic to. You will need to stay away from the source of the rash in the future to prevent it from coming back.   Treatment is done to ease itching and prevent the rash from coming back. The rash should go away in a few days to a few weeks.   Home care  Your healthcare provider may prescribe medicine to ease swelling and itching. Follow all instructions when using these medicines.   General care    Stay away from anything that heats up your skin, such as hot showers or baths, or direct sunlight. This can make itching worse.    Apply cold compresses to soothe your sores to help ease your symptoms. Do this for 30 minutes 3 to 4 times a day. You can make a cold compress by soaking a cloth in cold water. Squeeze out excess water. You can add colloidal oatmeal to the water to help reduce itching. For severe itching in a small area, apply an ice pack wrapped in a thin towel. Do this for 20 minutes 3 to 4 times a day.    You can also try wet dressings. One way to do this is to wear a wet piece of clothing under a dry one. Wear a damp shirt under a dry shirt if " your upper body is affected. This can relieve itching and prevent you from scratching the affected area.    You can also help ease large areas of itching by taking a lukewarm bath with colloidal oatmeal added to the water.    Use hydrocortisone cream for redness and irritation, unless another medicine was prescribed. Calamine lotion can also relieve mild symptoms.    Use oral diphenhydramine to help reduce itching. You can buy this antihistamine at drugstores and grocery stores. It can make you sleepy, so use lower doses during the daytime. Don't use diphenhydramine if you have glaucoma or have trouble urinating because of an enlarged prostate.    If a plant causes your rash, make sure to wash your skin and the clothes you were wearing when you came into contact with the plant. This is to wash away the plant oils that gave you the rash and prevent more or worse symptoms. If you have a pet that's been outdoors, its fur may also have oil from the plant. Bathe your pet with soap or shampoo.    Stay away from the substance or object that causes your symptoms. If you can t stay away from it, wear gloves or some other type of protection    Follow-up care  Follow up with your healthcare provider, or as advised.  When to seek medical advice  Call your healthcare provider or seek medical attention right away if any of these occur:     Spreading of the rash to other parts of your body    Severe swelling of your face, eyelids, mouth, throat or tongue    Trouble urinating due to swelling in the genital area    Fever of 100.4 F (38 C) or higher, or as advised by your provider    Redness or swelling that gets worse    Pain that gets worse    Foul-smelling fluid leaking from the skin    Yellow-brown crusts on the open blisters  [employer] last reviewed this educational content on 8/1/2019 2000-2021 [employer]. All rights reserved. This information is not intended as a substitute for professional medical care.  Always follow your healthcare professional's instructions.

## 2021-06-09 NOTE — PROGRESS NOTES
Assessment & Plan     1. Contact dermatitis, unspecified contact dermatitis type, unspecified trigger  - advised to wash bedding to prevent further contact with irritant.  Cool compresses to the face.  Cool showers with mild soap and water.  Benadryl prn itching.  Ok to continue Calamine prn itching.    - BP elevated in office and she reports she has not taken any of her medications today.  Advised to keep an eye on BP and blood sugars as steroids can elevate both.  - ED if develops chest pain, shortness of breath, wheezing, chest tightness, swelling of mouth/tongue, or difficulty swallowing.  Notify office or ED if symptoms not improved in the next 24 hours.   - predniSONE (DELTASONE) 20 MG tablet; Take 3 tabs by mouth daily x 3 days, then 2 tabs daily x 3 days, then 1 tab daily x 3 days, then 1/2 tab daily x 3 days.  Dispense: 20 tablet; Refill: 0      Return in about 1 day (around 6/10/2021) for worsening symptoms or failure to improve.    MARC Hess [facility]   Amarilis is a 61 year old who presents for the following health issues  accompanied by her spouse:    HPI     Rash  Onset/Duration: 3 days ago  Description  Location: Face, arms hands  Character: blotchy, raised, red  Itching: severe  Intensity:  severe  Progression of Symptoms:  worsening  Accompanying signs and symptoms:   Fever: no  Body aches or joint pain: no  Sore throat symptoms: no  Recent cold symptoms: no  History:           Previous episodes of similar rash: None  New exposures:  Poison ivy  Recent travel: no  Exposure to similar rash: no  Precipitating or alleviating factors: none  Therapies tried and outcome: zanfel cream    2 days ago had to spots on arms that were itchy.  Yesterday noticed swelling of her eyes and more lesions on her arms, neck, and ears.  She has cats that go outdoors and applies a natural flea/tick powder to them daily- suspects her cats may have been in contact with  poison ivy.  Her cats are sleeping on her bedding and pillows.    She has been applying Zanfel and Calamine to her skin and face for itching.        Review of Systems   Constitutional: Negative for chills and fever.   HENT: Negative for sore throat and trouble swallowing.    Respiratory: Negative for cough, chest tightness, shortness of breath and wheezing.    Cardiovascular: Negative for chest pain and palpitations.   Skin: Positive for rash.          Objective    BP (!) 192/100   Pulse 93   Temp 98.9  F (37.2  C) (Oral)   Wt 78.8 kg (173 lb 12.8 oz)   SpO2 99%   BMI 28.70 kg/m    Body mass index is 28.7 kg/m .  Physical Exam  Vitals signs reviewed.   Constitutional:       Appearance: She is well-developed.   HENT:      Head: Normocephalic.      Right Ear: Tympanic membrane normal.      Left Ear: Tympanic membrane normal.      Nose: Nose normal.      Mouth/Throat:      Mouth: Mucous membranes are moist.      Pharynx: Oropharynx is clear.   Eyes:      Conjunctiva/sclera: Conjunctivae normal.      Right eye: Right conjunctiva is not injected.      Left eye: Left conjunctiva is not injected.      Comments: Bilateral orbital edema.    Cardiovascular:      Rate and Rhythm: Normal rate and regular rhythm.   Pulmonary:      Effort: Pulmonary effort is normal.      Breath sounds: Normal breath sounds.   Skin:     General: Skin is warm and dry.      Findings: Rash present.      Comments: Slightly raised erythematous lesions on bilateral arms, chest, neck, and behind ears.     Neurological:      Mental Status: She is alert and oriented to person, place, and time.

## 2021-06-21 ENCOUNTER — MYC MEDICAL ADVICE (OUTPATIENT)
Dept: FAMILY MEDICINE | Facility: CLINIC | Age: 61
End: 2021-06-21

## 2021-09-13 ENCOUNTER — MYC MEDICAL ADVICE (OUTPATIENT)
Dept: FAMILY MEDICINE | Facility: CLINIC | Age: 61
End: 2021-09-13

## 2021-09-13 NOTE — TELEPHONE ENCOUNTER
TC - Please build a letter with the information that is asked about from the patient below.  When this is done please send to the provider to sign and then fax to the number given.  Thank you. Jackie Almodovar R.N.

## 2021-09-13 NOTE — LETTER
Patient: Amarilis Vazquez  : 1960          Below is the result of patient's A1c lab test, preformed on 2021.        Ref Range & Units 3 mo ago    Hemoglobin A1C 0 - 5.6 % 7.4High                     Kristen Kehr PA-C

## 2021-10-02 ENCOUNTER — HEALTH MAINTENANCE LETTER (OUTPATIENT)
Age: 61
End: 2021-10-02

## 2021-11-03 ENCOUNTER — MYC MEDICAL ADVICE (OUTPATIENT)
Dept: FAMILY MEDICINE | Facility: CLINIC | Age: 61
End: 2021-11-03

## 2021-11-18 ENCOUNTER — OFFICE VISIT (OUTPATIENT)
Dept: FAMILY MEDICINE | Facility: CLINIC | Age: 61
End: 2021-11-18
Payer: COMMERCIAL

## 2021-11-18 VITALS
BODY MASS INDEX: 28.24 KG/M2 | HEART RATE: 84 BPM | SYSTOLIC BLOOD PRESSURE: 136 MMHG | TEMPERATURE: 97.8 F | WEIGHT: 171 LBS | OXYGEN SATURATION: 99 % | DIASTOLIC BLOOD PRESSURE: 82 MMHG

## 2021-11-18 DIAGNOSIS — Z12.31 VISIT FOR SCREENING MAMMOGRAM: ICD-10-CM

## 2021-11-18 DIAGNOSIS — E11.29 TYPE 2 DIABETES MELLITUS WITH MICROALBUMINURIA, WITHOUT LONG-TERM CURRENT USE OF INSULIN (H): Primary | ICD-10-CM

## 2021-11-18 DIAGNOSIS — I10 BENIGN ESSENTIAL HYPERTENSION: ICD-10-CM

## 2021-11-18 DIAGNOSIS — R80.9 TYPE 2 DIABETES MELLITUS WITH MICROALBUMINURIA, WITHOUT LONG-TERM CURRENT USE OF INSULIN (H): Primary | ICD-10-CM

## 2021-11-18 LAB
ANION GAP SERPL CALCULATED.3IONS-SCNC: 7 MMOL/L (ref 3–14)
BUN SERPL-MCNC: 14 MG/DL (ref 7–30)
CALCIUM SERPL-MCNC: 9.7 MG/DL (ref 8.5–10.1)
CHLORIDE BLD-SCNC: 99 MMOL/L (ref 94–109)
CHOLEST SERPL-MCNC: 258 MG/DL
CO2 SERPL-SCNC: 28 MMOL/L (ref 20–32)
CREAT SERPL-MCNC: 0.62 MG/DL (ref 0.52–1.04)
CREAT UR-MCNC: 20 MG/DL
FASTING STATUS PATIENT QL REPORTED: YES
GFR SERPL CREATININE-BSD FRML MDRD: >90 ML/MIN/1.73M2
GLUCOSE BLD-MCNC: 141 MG/DL (ref 70–99)
HBA1C MFR BLD: 6.4 % (ref 0–5.6)
HDLC SERPL-MCNC: 42 MG/DL
LDLC SERPL CALC-MCNC: 170 MG/DL
MICROALBUMIN UR-MCNC: 7 MG/L
MICROALBUMIN/CREAT UR: 35 MG/G CR (ref 0–25)
NONHDLC SERPL-MCNC: 216 MG/DL
POTASSIUM BLD-SCNC: 4 MMOL/L (ref 3.4–5.3)
SODIUM SERPL-SCNC: 134 MMOL/L (ref 133–144)
TRIGL SERPL-MCNC: 229 MG/DL

## 2021-11-18 PROCEDURE — 80061 LIPID PANEL: CPT | Performed by: PHYSICIAN ASSISTANT

## 2021-11-18 PROCEDURE — 82043 UR ALBUMIN QUANTITATIVE: CPT | Performed by: PHYSICIAN ASSISTANT

## 2021-11-18 PROCEDURE — 99214 OFFICE O/P EST MOD 30 MIN: CPT | Performed by: PHYSICIAN ASSISTANT

## 2021-11-18 PROCEDURE — 83036 HEMOGLOBIN GLYCOSYLATED A1C: CPT | Performed by: PHYSICIAN ASSISTANT

## 2021-11-18 PROCEDURE — 80048 BASIC METABOLIC PNL TOTAL CA: CPT | Performed by: PHYSICIAN ASSISTANT

## 2021-11-18 PROCEDURE — 36415 COLL VENOUS BLD VENIPUNCTURE: CPT | Performed by: PHYSICIAN ASSISTANT

## 2021-11-18 RX ORDER — LISINOPRIL AND HYDROCHLOROTHIAZIDE 20; 25 MG/1; MG/1
1 TABLET ORAL DAILY
Qty: 90 TABLET | Refills: 1 | Status: SHIPPED | OUTPATIENT
Start: 2021-11-18 | End: 2022-07-05

## 2021-11-18 RX ORDER — METFORMIN HCL 500 MG
TABLET, EXTENDED RELEASE 24 HR ORAL
Qty: 360 TABLET | Refills: 1 | Status: SHIPPED | OUTPATIENT
Start: 2021-11-18 | End: 2022-07-05

## 2021-11-18 RX ORDER — LIRAGLUTIDE 6 MG/ML
INJECTION SUBCUTANEOUS
Qty: 3 ML | Refills: 5 | Status: SHIPPED | OUTPATIENT
Start: 2021-11-18 | End: 2022-07-12

## 2021-11-18 ASSESSMENT — PAIN SCALES - GENERAL: PAINLEVEL: NO PAIN (0)

## 2021-11-18 NOTE — NURSING NOTE
"Chief Complaint   Patient presents with     Hypertension     Diabetes     Lipids       Initial BP (!) 168/86   Pulse 84   Temp 97.8  F (36.6  C) (Tympanic)   Wt 77.6 kg (171 lb)   SpO2 99%   BMI 28.24 kg/m   Estimated body mass index is 28.24 kg/m  as calculated from the following:    Height as of 5/27/21: 1.657 m (5' 5.25\").    Weight as of this encounter: 77.6 kg (171 lb).  Medication Reconciliation: complete    TIA Payne MA    "

## 2021-11-18 NOTE — PROGRESS NOTES
"  Assessment & Plan     Type 2 diabetes mellitus with microalbuminuria, without long-term current use of insulin (H)  Check the following tests since she has been making changes with her diet. Encouraged to continue to make positive changes.   Plan to recheck in 6 months.   - Lipid panel reflex to direct LDL Fasting; Future  - HEMOGLOBIN A1C; Future  - Basic metabolic panel  (Ca, Cl, CO2, Creat, Gluc, K, Na, BUN); Future  - Albumin Random Urine Quantitative with Creat Ratio; Future  - Lipid panel reflex to direct LDL Fasting  - HEMOGLOBIN A1C  - Basic metabolic panel  (Ca, Cl, CO2, Creat, Gluc, K, Na, BUN)  - Albumin Random Urine Quantitative with Creat Ratio  - lisinopril-hydrochlorothiazide (ZESTORETIC) 20-25 MG tablet; Take 1 tablet by mouth daily  - metFORMIN (GLUCOPHAGE-XR) 500 MG 24 hr tablet; TAKE 2 TABLETS BY MOUTH TWICE A DAY  - liraglutide (VICTOZA PEN) 18 MG/3ML solution; INJECT 1.8 MG UNDER THE SKIN ONCE DAILY    Benign essential hypertension  Stable refills given   - lisinopril-hydrochlorothiazide (ZESTORETIC) 20-25 MG tablet; Take 1 tablet by mouth daily    Visit for screening mammogram  - MA SCREENING DIGITAL BILAT - Future  (s+30); Future      Discussed health maintenance and vaccines. She declines all vaccines today.   She is going to schedule her mammogram.      BMI:   Estimated body mass index is 28.24 kg/m  as calculated from the following:    Height as of 5/27/21: 1.657 m (5' 5.25\").    Weight as of this encounter: 77.6 kg (171 lb).   Weight management plan: Discussed healthy diet and exercise guidelines        Return in about 6 months (around 5/18/2022) for medication check, diabetes.    Kristen M. Kehr, PA-C [facility]    [place]   Jyoti is a 61 year old who presents for the following health issues       She has been making changes with her diet regimen and also taking new nutritional supplements. She is eating less processed foods and non GMO. She feels the neuropathy " is better. Blood sugars have been better also.     HPI     Diabetes Follow-up    How often are you checking your blood sugar? Varies  What time of day are you checking your blood sugars (select all that apply)?    Have you had any blood sugars above 200?  No  Have you had any blood sugars below 70?  No    What symptoms do you notice when your blood sugar is low?  None    What concerns do you have today about your diabetes? None     Do you have any of these symptoms? (Select all that apply)  Numbness in feet      BP Readings from Last 2 Encounters:   11/18/21 136/82   06/09/21 (!) 192/100     Hemoglobin A1C (%)   Date Value   11/18/2021 6.4 (H)   05/27/2021 7.4 (H)   11/19/2020 7.3 (H)     LDL Cholesterol Calculated (mg/dL)   Date Value   11/19/2020     Cannot estimate LDL when triglyceride exceeds 400 mg/dL   10/28/2019 101 (H)     LDL Cholesterol Direct (mg/dL)   Date Value   11/19/2020 159 (H)               Hyperlipidemia Follow-Up      Are you regularly taking any medication or supplement to lower your cholesterol?   Yes- see med list    Are you having muscle aches or other side effects that you think could be caused by your cholesterol lowering medication?  No    Hypertension Follow-up      Do you check your blood pressure regularly outside of the clinic? No     Are you following a low salt diet? Yes    Are your blood pressures ever more than 140 on the top number (systolic) OR more   than 90 on the bottom number (diastolic), for example 140/90?           Review of Systems   Constitutional, HEENT, cardiovascular, pulmonary, GI, , musculoskeletal, neuro, skin, endocrine and psych systems are negative, except as otherwise noted.      Objective    /82   Pulse 84   Temp 97.8  F (36.6  C) (Tympanic)   Wt 77.6 kg (171 lb)   SpO2 99%   BMI 28.24 kg/m    Body mass index is 28.24 kg/m .  Physical Exam   GENERAL: healthy, alert and no distress  RESP: lungs clear to auscultation - no rales, rhonchi or  wheezes  CV: regular rate and rhythm, normal S1 S2, no S3 or S4, no murmur, click or rub, no peripheral edema and peripheral pulses strong  MS: no gross musculoskeletal defects noted, no edema  SKIN: no suspicious lesions or rashes  NEURO: Normal strength and tone, mentation intact and speech normal  PSYCH: mentation appears normal, affect normal/bright  Diabetic foot exam: normal DP and PT pulses, no trophic changes or ulcerative lesions, normal sensory exam and normal monofilament exam

## 2021-11-30 ENCOUNTER — MYC MEDICAL ADVICE (OUTPATIENT)
Dept: FAMILY MEDICINE | Facility: CLINIC | Age: 61
End: 2021-11-30
Payer: COMMERCIAL

## 2022-01-10 ENCOUNTER — TRANSFERRED RECORDS (OUTPATIENT)
Dept: HEALTH INFORMATION MANAGEMENT | Facility: CLINIC | Age: 62
End: 2022-01-10
Payer: COMMERCIAL

## 2022-01-10 LAB
RETINOPATHY: NORMAL
RETINOPATHY: POSITIVE

## 2022-01-22 ENCOUNTER — HEALTH MAINTENANCE LETTER (OUTPATIENT)
Age: 62
End: 2022-01-22

## 2022-03-18 ENCOUNTER — OFFICE VISIT (OUTPATIENT)
Dept: URGENT CARE | Facility: URGENT CARE | Age: 62
End: 2022-03-18
Payer: COMMERCIAL

## 2022-03-18 ENCOUNTER — NURSE TRIAGE (OUTPATIENT)
Dept: FAMILY MEDICINE | Facility: CLINIC | Age: 62
End: 2022-03-18
Payer: COMMERCIAL

## 2022-03-18 VITALS
DIASTOLIC BLOOD PRESSURE: 89 MMHG | TEMPERATURE: 99.2 F | RESPIRATION RATE: 14 BRPM | SYSTOLIC BLOOD PRESSURE: 147 MMHG | OXYGEN SATURATION: 100 % | HEART RATE: 96 BPM

## 2022-03-18 DIAGNOSIS — I10 HYPERTENSION, UNCONTROLLED: Primary | ICD-10-CM

## 2022-03-18 DIAGNOSIS — N18.1 CHRONIC KIDNEY DISEASE, STAGE 1: ICD-10-CM

## 2022-03-18 DIAGNOSIS — E03.9 HYPOTHYROIDISM, UNSPECIFIED TYPE: ICD-10-CM

## 2022-03-18 LAB
ANION GAP SERPL CALCULATED.3IONS-SCNC: 12 MMOL/L (ref 3–14)
BUN SERPL-MCNC: 15 MG/DL (ref 7–30)
CALCIUM SERPL-MCNC: 9.9 MG/DL (ref 8.5–10.1)
CHLORIDE BLD-SCNC: 97 MMOL/L (ref 94–109)
CO2 SERPL-SCNC: 25 MMOL/L (ref 20–32)
CREAT SERPL-MCNC: 0.63 MG/DL (ref 0.52–1.04)
GFR SERPL CREATININE-BSD FRML MDRD: >90 ML/MIN/1.73M2
GLUCOSE BLD-MCNC: 131 MG/DL (ref 70–99)
POTASSIUM BLD-SCNC: 3.8 MMOL/L (ref 3.4–5.3)
SODIUM SERPL-SCNC: 134 MMOL/L (ref 133–144)
T4 FREE SERPL-MCNC: 1.57 NG/DL (ref 0.76–1.46)
TROPONIN I SERPL HS-MCNC: 3 NG/L
TSH SERPL DL<=0.005 MIU/L-ACNC: 0.32 MU/L (ref 0.4–4)

## 2022-03-18 PROCEDURE — 84443 ASSAY THYROID STIM HORMONE: CPT | Performed by: FAMILY MEDICINE

## 2022-03-18 PROCEDURE — 84439 ASSAY OF FREE THYROXINE: CPT | Performed by: FAMILY MEDICINE

## 2022-03-18 PROCEDURE — 93000 ELECTROCARDIOGRAM COMPLETE: CPT | Performed by: FAMILY MEDICINE

## 2022-03-18 PROCEDURE — 99214 OFFICE O/P EST MOD 30 MIN: CPT | Performed by: FAMILY MEDICINE

## 2022-03-18 PROCEDURE — 84484 ASSAY OF TROPONIN QUANT: CPT | Performed by: FAMILY MEDICINE

## 2022-03-18 PROCEDURE — 80048 BASIC METABOLIC PNL TOTAL CA: CPT | Performed by: FAMILY MEDICINE

## 2022-03-18 PROCEDURE — 36415 COLL VENOUS BLD VENIPUNCTURE: CPT | Performed by: FAMILY MEDICINE

## 2022-03-18 RX ORDER — METOPROLOL SUCCINATE 25 MG/1
25 TABLET, EXTENDED RELEASE ORAL DAILY
Qty: 30 TABLET | Refills: 0 | Status: SHIPPED | OUTPATIENT
Start: 2022-03-18 | End: 2022-04-07

## 2022-03-18 ASSESSMENT — PAIN SCALES - GENERAL: PAINLEVEL: NO PAIN (0)

## 2022-03-18 NOTE — TELEPHONE ENCOUNTER
Provider Recommendation Follow Up:   Reached patient/caregiver. Informed of provider's recommendations. Patient verbalized understanding and agrees with the plan.     Mary SALN, RN

## 2022-03-18 NOTE — TELEPHONE ENCOUNTER
Nurse Triage SBAR    Is this a 2nd Level Triage? YES, LICENSED PRACTITIONER REVIEW IS REQUIRED    Situation: pt calling due to bp's of 178/99 at 8 am, 165/98 p 102 10 am  Feels tense and worked up, hands are cold, feels revved up, left arm tingling on and off for one week. Pt  denies chest pain, sob, dizziness, or lightheadedness.      Background:  lisinopril-hydrochlorothiazide 20-25 mg on this dose for a long time for hypertension.    Assessment: needs evaluation    Protocol Recommended Disposition:    go to ED/UCC now or to office with pcp approval.    Recommendation: can we send to urgent care?     Routed to provider    Does the patient meet one of the following criteria for ADS visit consideration? No   Mary SALN, RN

## 2022-03-18 NOTE — TELEPHONE ENCOUNTER
"    Reason for Disposition    Systolic BP >= 160 OR Diastolic >= 100, and any cardiac or neurologic symptoms (e.g., chest pain, difficulty breathing, unsteady gait, blurred vision)    Additional Information    Negative: Sounds like a life-threatening emergency to the triager    Negative: Pregnant > 20 weeks or postpartum (< 6 weeks after delivery) and new hand or face swelling    Negative: Pregnant > 20 weeks and BP > 140/90    Answer Assessment - Initial Assessment Questions  1. BLOOD PRESSURE: \"What is the blood pressure?\" \"Did you take at least two measurements 5 minutes apart?\"      178/99 at 8 am, 165/98 p 102 10 am   2. ONSET: \"When did you take your blood pressure?\"      See above  3. HOW: \"How did you obtain the blood pressure?\" (e.g., visiting nurse, automatic home BP monitor)      Home machine  4. HISTORY: \"Do you have a history of high blood pressure?\"      Hx htn  5. MEDICATIONS: \"Are you taking any medications for blood pressure?\" \"Have you missed any doses recently?\"      lisinopril-hydrochlorothiazide 20-25 mg on this dose for a long time  6. OTHER SYMPTOMS: \"Do you have any symptoms?\" (e.g., headache, chest pain, blurred vision, difficulty breathing, weakness)      Feels tense and worked up, hands are cold, feel revved up, left arm tingling on and off for one week. Pt  denies chest pain, sob, dizziness, or lightheadedness.  7. PREGNANCY: \"Is there any chance you are pregnant?\" \"When was your last menstrual period?\"      Not asked    Protocols used: HIGH BLOOD PRESSURE-A-OH      "

## 2022-03-18 NOTE — PROGRESS NOTES
Assessment & Plan     Hypertension, uncontrolled no obvious end organ damage. added in metoprolol at low dose which may also help her tachycardia and sense of feeling speeded up. Arranged follow up with her doctor in about 2 weeks. She will be monitoring her blood pressure and will contact her clinic if symptoms persisting/worsening.  - EKG 12-lead complete w/read - Clinics  - Basic metabolic panel  (Ca, Cl, CO2, Creat, Gluc, K, Na, BUN)  - metoprolol succinate ER (TOPROL-XL) 25 MG 24 hr tablet  Dispense: 30 tablet; Refill: 0  - Basic metabolic panel  (Ca, Cl, CO2, Creat, Gluc, K, Na, BUN)    Chronic kidney disease, stage 1       Hypothyroidism, unspecified type  Change dosage based on results   - TSH with free T4 reflex  - TSH with free T4 reflex  Change dosage based on results      [id number]}    Return in about 2 weeks (around 4/1/2022) for follow up appointment.    David Martínez MD  [facility]    ------------------------------------------------------------------------  Subjective     Amarilis Vazquez presents to clinic today for the following health issues:  chief complaint  HPI  Feels off today. as she woke she rolled over in bed and began to feel off like her eyes could not focus. This improved but she continues to feel out of sorts like all speeded up feeling worked up, and hands feels cold. She does not have other cv, neuro, gi, gu symptoms. Feeling anxious particularly about BP that have anjelica elevated today on home monitoring. Usually are borderline high but today was in the 170-190 range.     The patient has a history of cad, dm, htn, hyperlipidemia.       Review of Systems  Some intentional weight loss due to diet/exercise the past few years. No other const, cv, resp, ent symptoms. No gi nor gu nor psych symptoms.       Objective    BP (!) 147/89   Pulse 96   Temp 99.2  F (37.3  C) (Tympanic)   Resp 14   SpO2 100%   Physical Exam   GENERAL: healthy, alert and no distress  EYES: Eyes grossly normal  to inspection, PERRL and conjunctivae and sclerae normal  HENT: ear canals and TM's normal, nose and mouth without ulcers or lesions  NECK: no adenopathy, no asymmetry, masses, or scars and thyroid normal to palpation  RESP: lungs clear to auscultation - no rales, rhonchi or wheezes  CV: regular rate and rhythm, normal S1 S2, no S3 or S4, no murmur, click or rub, no peripheral edema and peripheral pulses strong  ABDOMEN: soft, nontender, no hepatosplenomegaly, no masses and bowel sounds normal  MS: no gross musculoskeletal defects noted, no edema  SKIN: no suspicious lesions or rashes  PSYCH: mentation appears normal, affect normal/bright  NEURO: Normal strength and tone, sensory exam grossly normal, mentation intact and speech normal. Cranial nerve exam negative. Coordination normal. Danyell-halpike normal.     ekg no acute changes.     Results for orders placed or performed in visit on 03/18/22   Troponin I     Status: Normal   Result Value Ref Range    Troponin I High Sensitivity 3 <54 ng/L

## 2022-04-07 ENCOUNTER — OFFICE VISIT (OUTPATIENT)
Dept: FAMILY MEDICINE | Facility: CLINIC | Age: 62
End: 2022-04-07
Payer: COMMERCIAL

## 2022-04-07 VITALS
HEART RATE: 91 BPM | TEMPERATURE: 98 F | DIASTOLIC BLOOD PRESSURE: 78 MMHG | WEIGHT: 167 LBS | SYSTOLIC BLOOD PRESSURE: 138 MMHG | BODY MASS INDEX: 27.58 KG/M2 | OXYGEN SATURATION: 98 %

## 2022-04-07 DIAGNOSIS — E11.29 TYPE 2 DIABETES MELLITUS WITH MICROALBUMINURIA, WITHOUT LONG-TERM CURRENT USE OF INSULIN (H): ICD-10-CM

## 2022-04-07 DIAGNOSIS — E03.9 HYPOTHYROIDISM, UNSPECIFIED TYPE: Primary | ICD-10-CM

## 2022-04-07 DIAGNOSIS — I10 HYPERTENSION, UNCONTROLLED: ICD-10-CM

## 2022-04-07 DIAGNOSIS — R80.9 TYPE 2 DIABETES MELLITUS WITH MICROALBUMINURIA, WITHOUT LONG-TERM CURRENT USE OF INSULIN (H): ICD-10-CM

## 2022-04-07 PROCEDURE — 99214 OFFICE O/P EST MOD 30 MIN: CPT | Performed by: PHYSICIAN ASSISTANT

## 2022-04-07 RX ORDER — LEVOTHYROXINE SODIUM 88 UG/1
88 TABLET ORAL DAILY
Qty: 90 TABLET | Refills: 3 | Status: SHIPPED | OUTPATIENT
Start: 2022-04-07 | End: 2023-02-27

## 2022-04-07 RX ORDER — METOPROLOL SUCCINATE 25 MG/1
25 TABLET, EXTENDED RELEASE ORAL DAILY
Qty: 90 TABLET | Refills: 1 | Status: SHIPPED | OUTPATIENT
Start: 2022-04-07 | End: 2022-10-04

## 2022-04-07 NOTE — NURSING NOTE
"Chief Complaint   Patient presents with     Hypertension       Initial /78   Pulse 91   Temp 98  F (36.7  C) (Tympanic)   Wt 75.8 kg (167 lb)   SpO2 98%   BMI 27.58 kg/m   Estimated body mass index is 27.58 kg/m  as calculated from the following:    Height as of 5/27/21: 1.657 m (5' 5.25\").    Weight as of this encounter: 75.8 kg (167 lb).  Medication Reconciliation: complete    TIA Payne MA    "

## 2022-04-07 NOTE — PROGRESS NOTES
Assessment & Plan     Hypothyroidism, unspecified type  She is going to decrease the dose of the levothyroxine to 88 mcg daily.   Recheck in 2 months with lab only appointment.   She will be due for an A1C at that time also   - levothyroxine (SYNTHROID/LEVOTHROID) 88 MCG tablet; Take 1 tablet (88 mcg) by mouth daily  - **TSH with free T4 reflex FUTURE 2mo; Future    Type 2 diabetes mellitus with microalbuminuria, without long-term current use of insulin (H)  See above.   She will be due for an A1C in a few months.   Plan recheck with fasting tests in 6 months.   She continues to make progress with healthy habits and weight loss.   Encouraged to continue.   She declines aspirin daily and statin. She will be having lipids checked again at her next appointment.   She has medication at home and does not currently need refills.   She will contact the clinic if any needed prior to her next appointment.   - Hemoglobin A1c; Future    Hypertension, uncontrolled  Improvement with the toprol.   Continue with the lisinopril / hydrochlorothiazide also. She has medication at home.   - metoprolol succinate ER (TOPROL-XL) 25 MG 24 hr tablet; Take 1 tablet (25 mg) by mouth daily      30 minutes spent on the date of the encounter doing chart review, review of test results, patient visit and documentation            Return in about 6 months (around 10/7/2022) for Routine Visit, diabetes, fasting lab appointment.    Kristen M. Kehr, PA-C  [facility] [place]    Subjective   Jyoti is a 62 year old who presents for the following health issues     Jyoti was recently seen in the Urgent Care for hypertension. She is taking the metoprolol and tolerating it well. She is taking blood pressure readings at home and they are improving. Today, her blood pressure is at goal.     She continues to make healthy change and monitoring her blood sugars also. She is taking her diabetes medication, but concentrating on the quality of her food.  She is losing weight and also researching supplements.     Because she was having heart palpitations when she was in the Urgent Care, she had thyroid testing completed. Her thyroid levels are still low after 9 months of her last check and she will need adjustment. She is currently taking 100 mcg daily of the levothyroxine.     History of Present Illness       Hypertension: She presents for follow up of hypertension.  She does check blood pressure  regularly outside of the clinic. Outside blood pressures have been over 140/90. She follows a low salt diet.     She eats 4 or more servings of fruits and vegetables daily.She consumes 0 sweetened beverage(s) daily.She exercises with enough effort to increase her heart rate 9 or less minutes per day.  She exercises with enough effort to increase her heart rate 3 or less days per week.   She is taking medications regularly.             Review of Systems   Constitutional, HEENT, cardiovascular, pulmonary, GI, , musculoskeletal, neuro, skin, endocrine and psych systems are negative, except as otherwise noted.      Objective    /78   Pulse 91   Temp 98  F (36.7  C) (Tympanic)   Wt 75.8 kg (167 lb)   SpO2 98%   BMI 27.58 kg/m    Body mass index is 27.58 kg/m .  Physical Exam   GENERAL: healthy, alert and no distress  RESP: lungs clear to auscultation - no rales, rhonchi or wheezes  CV: regular rate and rhythm, normal S1 S2, no S3 or S4, no murmur, click or rub, no peripheral edema and peripheral pulses strong  MS: no gross musculoskeletal defects noted, no edema  SKIN: no suspicious lesions or rashes  NEURO: Normal strength and tone, mentation intact and speech normal  PSYCH: mentation appears normal, affect normal/bright    Office Visit on 03/18/2022   Component Date Value Ref Range Status     TSH 03/18/2022 0.32 (A) 0.40 - 4.00 mU/L Final     Sodium 03/18/2022 134  133 - 144 mmol/L Final     Potassium 03/18/2022 3.8  3.4 - 5.3 mmol/L Final     Chloride 03/18/2022  97  94 - 109 mmol/L Final     Carbon Dioxide (CO2) 03/18/2022 25  20 - 32 mmol/L Final     Anion Gap 03/18/2022 12  3 - 14 mmol/L Final     Urea Nitrogen 03/18/2022 15  7 - 30 mg/dL Final     Creatinine 03/18/2022 0.63  0.52 - 1.04 mg/dL Final     Calcium 03/18/2022 9.9  8.5 - 10.1 mg/dL Final     Glucose 03/18/2022 131 (A) 70 - 99 mg/dL Final     GFR Estimate 03/18/2022 >90  >60 mL/min/1.73m2 Final    Effective December 21, 2021 eGFRcr in adults is calculated using the 2021 CKD-EPI creatinine equation which includes age and gender (Ty et al., NEJ, DOI: 10.1056/AJUIkv7961019)     Troponin I High Sensitivity 03/18/2022 3  <54 ng/L Final    This Troponin-I result was obtained using a Siemens Dimension Vista High Sensitivity Troponin-I assay (TNIH). Effective 11/23/21, nine labs/sites in the [facility] switched from a Siemens [place] Contemporary Troponin I assay (CTNI) to a Siemens [place] High-Sensitivity Troponin I assay (TNIH).     Free T4 03/18/2022 1.57 (A) 0.76 - 1.46 ng/dL Final

## 2022-04-08 ENCOUNTER — MYC MEDICAL ADVICE (OUTPATIENT)
Dept: FAMILY MEDICINE | Facility: CLINIC | Age: 62
End: 2022-04-08
Payer: COMMERCIAL

## 2022-05-02 ENCOUNTER — TELEPHONE (OUTPATIENT)
Dept: AUDIOLOGY | Facility: CLINIC | Age: 62
End: 2022-05-02
Payer: COMMERCIAL

## 2022-05-02 NOTE — TELEPHONE ENCOUNTER
Reason for Call:  Other     Detailed comments: Left hearing aid does not work, lights up when charging, however does not turn on/work - wondering if there is a way to fix it/reset it. Would prefer if someone on the team can call back to further advise and perhaps give a heads up on cost of replacement/repair.    Phone Number Patient can be reached at: Cell number on file:    Telephone Information:   Mobile [phone]       Best Time: anytime    Can we leave a detailed message on this number? YES    Call taken on 5/2/2022 at 8:21 AM by Sarai Harvey

## 2022-05-02 NOTE — TELEPHONE ENCOUNTER
Called patient back. It sounds like the hearing aid is charging and turning on, but there is no sound. The patient was not able to find her wax guards to try changing that. I encouraged her to drop off the hearing aid at the [facility] and I will look at it when I am there on Wednesday this week. I will also provide her with a spare pack of wax guards. I also encouraged the patient to schedule a hearing aid check so we can go over hearing aid maintenance and do a thorough clean and check of both devices.    Ru Hernandez, CCC-A  [place] Licensed Audiologist #[id number]  5/2/2022

## 2022-05-04 ENCOUNTER — ALLIED HEALTH/NURSE VISIT (OUTPATIENT)
Dept: AUDIOLOGY | Facility: OTHER | Age: 62
End: 2022-05-04
Payer: COMMERCIAL

## 2022-05-04 DIAGNOSIS — H90.3 SENSORINEURAL HEARING LOSS, BILATERAL: Primary | ICD-10-CM

## 2022-05-04 PROCEDURE — V5299 HEARING SERVICE: HCPCS | Performed by: AUDIOLOGIST

## 2022-05-04 NOTE — PROGRESS NOTES
HEARING AID DROP-OFF    Patient Name:  Amarilis Vazquez    Patient Age:   62 year old    :  1960    Background:   The patient dropped off her left hearing aid because it was not working.     SIDE: Left   MAKE: Phonak   MODEL: Audeo M30-R   S/N: [id number]   WARRANTY: 10/3/2022    Procedures:   A biological listening check revealed no amplification and no start up sounds after the hearing aid was charged. The  was changed and a listening check revealed good sound.    The patient requested wax guards as well as supplies for the other hearing aid, so a pack of wax guards, a spare dome, and a retention tail were dispensed.    Plan:   Called patient and let her know that her hearing aid is ready to . The patient will follow up as needed.      Ru Hernandez, CCC-A  Licensed Audiologist  2022

## 2022-05-14 ENCOUNTER — HEALTH MAINTENANCE LETTER (OUTPATIENT)
Age: 62
End: 2022-05-14

## 2022-05-24 ENCOUNTER — TRANSFERRED RECORDS (OUTPATIENT)
Dept: HEALTH INFORMATION MANAGEMENT | Facility: CLINIC | Age: 62
End: 2022-05-24
Payer: COMMERCIAL

## 2022-05-28 ENCOUNTER — MYC MEDICAL ADVICE (OUTPATIENT)
Dept: FAMILY MEDICINE | Facility: CLINIC | Age: 62
End: 2022-05-28
Payer: COMMERCIAL

## 2022-06-07 ENCOUNTER — LAB (OUTPATIENT)
Dept: LAB | Facility: CLINIC | Age: 62
End: 2022-06-07
Payer: COMMERCIAL

## 2022-06-07 DIAGNOSIS — E11.29 TYPE 2 DIABETES MELLITUS WITH MICROALBUMINURIA, WITHOUT LONG-TERM CURRENT USE OF INSULIN (H): ICD-10-CM

## 2022-06-07 DIAGNOSIS — E03.9 HYPOTHYROIDISM, UNSPECIFIED TYPE: ICD-10-CM

## 2022-06-07 DIAGNOSIS — R80.9 TYPE 2 DIABETES MELLITUS WITH MICROALBUMINURIA, WITHOUT LONG-TERM CURRENT USE OF INSULIN (H): ICD-10-CM

## 2022-06-07 LAB
HBA1C MFR BLD: 6.3 % (ref 0–5.6)
TSH SERPL DL<=0.005 MIU/L-ACNC: 1.9 MU/L (ref 0.4–4)

## 2022-06-07 PROCEDURE — 83036 HEMOGLOBIN GLYCOSYLATED A1C: CPT

## 2022-06-07 PROCEDURE — 36415 COLL VENOUS BLD VENIPUNCTURE: CPT

## 2022-06-07 PROCEDURE — 84443 ASSAY THYROID STIM HORMONE: CPT

## 2022-07-05 DIAGNOSIS — I10 BENIGN ESSENTIAL HYPERTENSION: ICD-10-CM

## 2022-07-05 DIAGNOSIS — E03.9 HYPOTHYROIDISM, UNSPECIFIED TYPE: ICD-10-CM

## 2022-07-05 DIAGNOSIS — R80.9 TYPE 2 DIABETES MELLITUS WITH MICROALBUMINURIA, WITHOUT LONG-TERM CURRENT USE OF INSULIN (H): ICD-10-CM

## 2022-07-05 DIAGNOSIS — E11.29 TYPE 2 DIABETES MELLITUS WITH MICROALBUMINURIA, WITHOUT LONG-TERM CURRENT USE OF INSULIN (H): ICD-10-CM

## 2022-07-05 RX ORDER — LISINOPRIL AND HYDROCHLOROTHIAZIDE 20; 25 MG/1; MG/1
TABLET ORAL
Qty: 90 TABLET | Refills: 0 | Status: SHIPPED | OUTPATIENT
Start: 2022-07-05 | End: 2022-09-14

## 2022-07-05 RX ORDER — METFORMIN HCL 500 MG
TABLET, EXTENDED RELEASE 24 HR ORAL
Qty: 360 TABLET | Refills: 0 | Status: SHIPPED | OUTPATIENT
Start: 2022-07-05 | End: 2022-09-14

## 2022-07-05 NOTE — TELEPHONE ENCOUNTER
Routing refill request to provider for review/approval because:  This is a different dose than what is on her medication list.  Thank you. Jackie Almodovar R.N.

## 2022-07-05 NOTE — TELEPHONE ENCOUNTER
Refilled x 3 month per protocol.  Patient needs to be seen for further refills. Thank you. Jackie Almodovar R.N.

## 2022-07-06 RX ORDER — LEVOTHYROXINE SODIUM 100 UG/1
100 TABLET ORAL DAILY
Qty: 90 TABLET | Refills: 3 | OUTPATIENT
Start: 2022-07-06

## 2022-07-06 NOTE — TELEPHONE ENCOUNTER
The levothyroxine dose was adjusted to 88 mcg in April 2022.   This is the wrong dose.   Refill denied.   Kristen Kehr PA-C

## 2022-07-12 DIAGNOSIS — E11.29 TYPE 2 DIABETES MELLITUS WITH MICROALBUMINURIA, WITHOUT LONG-TERM CURRENT USE OF INSULIN (H): ICD-10-CM

## 2022-07-12 DIAGNOSIS — R80.9 TYPE 2 DIABETES MELLITUS WITH MICROALBUMINURIA, WITHOUT LONG-TERM CURRENT USE OF INSULIN (H): ICD-10-CM

## 2022-07-12 DIAGNOSIS — I10 BENIGN ESSENTIAL HYPERTENSION: ICD-10-CM

## 2022-07-12 RX ORDER — LISINOPRIL AND HYDROCHLOROTHIAZIDE 20; 25 MG/1; MG/1
TABLET ORAL
Qty: 90 TABLET | Refills: 0 | OUTPATIENT
Start: 2022-07-12

## 2022-07-12 RX ORDER — LIRAGLUTIDE 6 MG/ML
INJECTION SUBCUTANEOUS
Qty: 27 ML | Refills: 0 | Status: SHIPPED | OUTPATIENT
Start: 2022-07-12 | End: 2022-10-04

## 2022-09-03 ENCOUNTER — HEALTH MAINTENANCE LETTER (OUTPATIENT)
Age: 62
End: 2022-09-03

## 2022-09-09 ENCOUNTER — OFFICE VISIT (OUTPATIENT)
Dept: AUDIOLOGY | Facility: OTHER | Age: 62
End: 2022-09-09
Payer: COMMERCIAL

## 2022-09-09 DIAGNOSIS — H90.3 SENSORINEURAL HEARING LOSS, BILATERAL: Primary | ICD-10-CM

## 2022-09-09 PROCEDURE — 99207 PR NO CHARGE LOS: CPT | Performed by: AUDIOLOGIST

## 2022-09-09 PROCEDURE — V5299 HEARING SERVICE: HCPCS | Performed by: AUDIOLOGIST

## 2022-09-09 NOTE — PROGRESS NOTES
AUDIOLOGY REPORT: HEARING AID RECHECK    SUBJECTIVE: Amarilis Vazquez is a 62 year old female, :  1960, was seen in the Audiology Clinic at  [facility] on 22 for a return check of her hearing aids. The patient was unaccompanied to today's appointment.       Background:   Patient is here today with the complaint of the right hearing aid sometimes cutting in and out. She would also like the retention tails removed. She wears Phonak Audeo M30-R hearing aids.    Procedures:   A biological listening check revealed good sound from both hearing aids. Both were cleaned and checked and the wax guards and domes were changed. The retention tails were removed. The patient asked about changing wax guards since she has not had to do so, and this was discussed, with the patient changing one of the wax guards herself. She was provided with another pack of wax guards as a courtesy, since she is not sure where hers are. She also requested and was provided with an extra set of domes.   The patient asked about eligibility for new hearing aids, since her warranty expires next month. Per previous insurance verification she is eligible every three years, which would be next July. The patient was not interested in sending her hearing aids in for a battery change before end of warranty since she will likely get new hearing aids next summer.    Plan:   Patient will return as needed for hearing aid concerns. Discussed the process for new hearing aids, and the patient was encouraged to return for a combined hearing test and hearing aid consultation next summer.    NO CHARGE VISIT    Ru Hernandez, CCC-A  Licensed Audiologist #[id number]  2022

## 2022-09-14 DIAGNOSIS — R80.9 TYPE 2 DIABETES MELLITUS WITH MICROALBUMINURIA, WITHOUT LONG-TERM CURRENT USE OF INSULIN (H): ICD-10-CM

## 2022-09-14 DIAGNOSIS — I10 BENIGN ESSENTIAL HYPERTENSION: ICD-10-CM

## 2022-09-14 DIAGNOSIS — E11.29 TYPE 2 DIABETES MELLITUS WITH MICROALBUMINURIA, WITHOUT LONG-TERM CURRENT USE OF INSULIN (H): ICD-10-CM

## 2022-09-14 RX ORDER — LISINOPRIL AND HYDROCHLOROTHIAZIDE 20; 25 MG/1; MG/1
TABLET ORAL
Qty: 90 TABLET | Refills: 0 | Status: SHIPPED | OUTPATIENT
Start: 2022-09-14 | End: 2022-10-04

## 2022-09-14 RX ORDER — METFORMIN HCL 500 MG
TABLET, EXTENDED RELEASE 24 HR ORAL
Qty: 360 TABLET | Refills: 0 | Status: SHIPPED | OUTPATIENT
Start: 2022-09-14 | End: 2022-10-04

## 2022-09-23 ENCOUNTER — DOCUMENTATION ONLY (OUTPATIENT)
Dept: LAB | Facility: CLINIC | Age: 62
End: 2022-09-23

## 2022-09-23 DIAGNOSIS — E03.9 HYPOTHYROIDISM, UNSPECIFIED TYPE: ICD-10-CM

## 2022-09-23 DIAGNOSIS — R80.9 TYPE 2 DIABETES MELLITUS WITH MICROALBUMINURIA, WITHOUT LONG-TERM CURRENT USE OF INSULIN (H): Primary | ICD-10-CM

## 2022-09-23 DIAGNOSIS — E11.29 TYPE 2 DIABETES MELLITUS WITH MICROALBUMINURIA, WITHOUT LONG-TERM CURRENT USE OF INSULIN (H): Primary | ICD-10-CM

## 2022-09-23 NOTE — PROGRESS NOTES
Amarilis HIDALGO George has an upcoming lab appointment:    Future Appointments   Date Time Provider Department Center   9/27/2022  8:15 AM [facility]   10/4/2022  8:30 AM Kehr, Kristen M, PA-C [facility]   7/24/2023 10:00 AM Molly Melgoza, Timothy [facility] [place] [facility]   8/17/2023 10:00 AM Molly Melgoza [facility] [place] [facility]   9/7/2023 10:00 AM Molly Melgoza, Timothy [facility] [place] [facility]     Patient is scheduled for the following lab(s):     There is no order available. Please review and place either future orders or HMPO (Review of Health Maintenance Protocol Orders), as appropriate.    Health Maintenance Due   Topic     ANNUAL REVIEW OF HM ORDERS      HIV SCREENING      Shanita Rodriguez

## 2022-09-27 ENCOUNTER — LAB (OUTPATIENT)
Dept: LAB | Facility: CLINIC | Age: 62
End: 2022-09-27
Payer: COMMERCIAL

## 2022-09-27 ENCOUNTER — DOCUMENTATION ONLY (OUTPATIENT)
Dept: LAB | Facility: CLINIC | Age: 62
End: 2022-09-27

## 2022-09-27 DIAGNOSIS — E03.9 HYPOTHYROIDISM, UNSPECIFIED TYPE: ICD-10-CM

## 2022-09-27 DIAGNOSIS — E11.29 TYPE 2 DIABETES MELLITUS WITH MICROALBUMINURIA, WITHOUT LONG-TERM CURRENT USE OF INSULIN (H): ICD-10-CM

## 2022-09-27 DIAGNOSIS — R80.9 TYPE 2 DIABETES MELLITUS WITH MICROALBUMINURIA, WITHOUT LONG-TERM CURRENT USE OF INSULIN (H): Primary | ICD-10-CM

## 2022-09-27 DIAGNOSIS — E11.29 TYPE 2 DIABETES MELLITUS WITH MICROALBUMINURIA, WITHOUT LONG-TERM CURRENT USE OF INSULIN (H): Primary | ICD-10-CM

## 2022-09-27 DIAGNOSIS — R80.9 TYPE 2 DIABETES MELLITUS WITH MICROALBUMINURIA, WITHOUT LONG-TERM CURRENT USE OF INSULIN (H): ICD-10-CM

## 2022-09-27 LAB
ANION GAP SERPL CALCULATED.3IONS-SCNC: 7 MMOL/L (ref 3–14)
BUN SERPL-MCNC: 14 MG/DL (ref 7–30)
CALCIUM SERPL-MCNC: 9.6 MG/DL (ref 8.5–10.1)
CHLORIDE BLD-SCNC: 100 MMOL/L (ref 94–109)
CHOLEST SERPL-MCNC: 274 MG/DL
CO2 SERPL-SCNC: 29 MMOL/L (ref 20–32)
CREAT SERPL-MCNC: 0.6 MG/DL (ref 0.52–1.04)
CREAT UR-MCNC: 53 MG/DL
DEPRECATED CALCIDIOL+CALCIFEROL SERPL-MC: 50 UG/L (ref 20–75)
FASTING STATUS PATIENT QL REPORTED: YES
GFR SERPL CREATININE-BSD FRML MDRD: >90 ML/MIN/1.73M2
GLUCOSE BLD-MCNC: 141 MG/DL (ref 70–99)
HBA1C MFR BLD: 6.3 % (ref 0–5.6)
HDLC SERPL-MCNC: 41 MG/DL
HOLD SPECIMEN: NORMAL
LDLC SERPL CALC-MCNC: 167 MG/DL
MICROALBUMIN UR-MCNC: 13 MG/L
MICROALBUMIN/CREAT UR: 24.53 MG/G CR (ref 0–25)
NONHDLC SERPL-MCNC: 233 MG/DL
POTASSIUM BLD-SCNC: 4.3 MMOL/L (ref 3.4–5.3)
SODIUM SERPL-SCNC: 136 MMOL/L (ref 133–144)
TRIGL SERPL-MCNC: 330 MG/DL
TSH SERPL DL<=0.005 MIU/L-ACNC: 2.01 MU/L (ref 0.4–4)

## 2022-09-27 PROCEDURE — 82043 UR ALBUMIN QUANTITATIVE: CPT

## 2022-09-27 PROCEDURE — 80048 BASIC METABOLIC PNL TOTAL CA: CPT

## 2022-09-27 PROCEDURE — 83036 HEMOGLOBIN GLYCOSYLATED A1C: CPT

## 2022-09-27 PROCEDURE — 84443 ASSAY THYROID STIM HORMONE: CPT

## 2022-09-27 PROCEDURE — 36415 COLL VENOUS BLD VENIPUNCTURE: CPT

## 2022-09-27 PROCEDURE — 82306 VITAMIN D 25 HYDROXY: CPT

## 2022-09-27 PROCEDURE — 80061 LIPID PANEL: CPT

## 2022-09-27 NOTE — PROGRESS NOTES
Pt is requesting a Vitamin D lab with her other labs she is getting this morning!   I drew the extra tube so if you are wanting that done can you please place the order?    Thank you,   Kassi PEREZT
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
## 2022-10-03 NOTE — PATIENT INSTRUCTIONS
Stool screening card at the lab    Schedule an appointment with nursing for BP check in 1 month    Add back the metoprolol    Start daily aspirin 81 mg     Appointment in 6 months with lab appointment prior to visit

## 2022-10-04 ENCOUNTER — OFFICE VISIT (OUTPATIENT)
Dept: FAMILY MEDICINE | Facility: CLINIC | Age: 62
End: 2022-10-04
Payer: COMMERCIAL

## 2022-10-04 VITALS
TEMPERATURE: 97.7 F | RESPIRATION RATE: 16 BRPM | WEIGHT: 163 LBS | DIASTOLIC BLOOD PRESSURE: 84 MMHG | BODY MASS INDEX: 27.16 KG/M2 | HEART RATE: 89 BPM | OXYGEN SATURATION: 99 % | HEIGHT: 65 IN | SYSTOLIC BLOOD PRESSURE: 146 MMHG

## 2022-10-04 DIAGNOSIS — E03.9 HYPOTHYROIDISM, UNSPECIFIED TYPE: ICD-10-CM

## 2022-10-04 DIAGNOSIS — R80.9 TYPE 2 DIABETES MELLITUS WITH MICROALBUMINURIA, WITHOUT LONG-TERM CURRENT USE OF INSULIN (H): Primary | ICD-10-CM

## 2022-10-04 DIAGNOSIS — Z12.11 SCREEN FOR COLON CANCER: ICD-10-CM

## 2022-10-04 DIAGNOSIS — E11.29 TYPE 2 DIABETES MELLITUS WITH MICROALBUMINURIA, WITHOUT LONG-TERM CURRENT USE OF INSULIN (H): Primary | ICD-10-CM

## 2022-10-04 DIAGNOSIS — I10 BENIGN ESSENTIAL HYPERTENSION: ICD-10-CM

## 2022-10-04 DIAGNOSIS — E78.2 MIXED HYPERLIPIDEMIA: ICD-10-CM

## 2022-10-04 PROCEDURE — 99214 OFFICE O/P EST MOD 30 MIN: CPT | Performed by: PHYSICIAN ASSISTANT

## 2022-10-04 RX ORDER — METOPROLOL SUCCINATE 25 MG/1
25 TABLET, EXTENDED RELEASE ORAL DAILY
Qty: 90 TABLET | Refills: 1 | Status: SHIPPED | OUTPATIENT
Start: 2022-10-04 | End: 2023-01-05

## 2022-10-04 RX ORDER — LIRAGLUTIDE 6 MG/ML
INJECTION SUBCUTANEOUS
Qty: 27 ML | Refills: 3 | Status: SHIPPED | OUTPATIENT
Start: 2022-10-04 | End: 2022-10-04

## 2022-10-04 RX ORDER — METFORMIN HCL 500 MG
TABLET, EXTENDED RELEASE 24 HR ORAL
Qty: 360 TABLET | Refills: 1 | Status: SHIPPED | OUTPATIENT
Start: 2022-10-04 | End: 2023-03-15

## 2022-10-04 RX ORDER — LIRAGLUTIDE 6 MG/ML
INJECTION SUBCUTANEOUS
Qty: 27 ML | Refills: 3 | Status: SHIPPED | OUTPATIENT
Start: 2022-10-04 | End: 2023-03-15

## 2022-10-04 RX ORDER — LISINOPRIL AND HYDROCHLOROTHIAZIDE 20; 25 MG/1; MG/1
TABLET ORAL
Qty: 90 TABLET | Refills: 1 | Status: SHIPPED | OUTPATIENT
Start: 2022-10-04 | End: 2023-03-15

## 2022-10-04 ASSESSMENT — ENCOUNTER SYMPTOMS
ARTHRALGIAS: 1
DIZZINESS: 0
SHORTNESS OF BREATH: 0
PALPITATIONS: 1
WEAKNESS: 0
EYE PAIN: 0
MYALGIAS: 0
DYSURIA: 0
BREAST MASS: 0
ABDOMINAL PAIN: 0
HEADACHES: 0
HEMATOCHEZIA: 0
JOINT SWELLING: 0
NERVOUS/ANXIOUS: 1
FREQUENCY: 0
DIARRHEA: 0
NAUSEA: 0
PARESTHESIAS: 1
HEARTBURN: 0
COUGH: 0
CHILLS: 0
SORE THROAT: 0
CONSTIPATION: 0
FEVER: 0
HEMATURIA: 0

## 2022-10-04 ASSESSMENT — PAIN SCALES - GENERAL: PAINLEVEL: NO PAIN (0)
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
## 2022-10-05 ENCOUNTER — TELEPHONE (OUTPATIENT)
Dept: BEHAVIORAL HEALTH | Facility: CLINIC | Age: 62
End: 2022-10-05

## 2022-10-05 NOTE — TELEPHONE ENCOUNTER
10/5/22 854A - Writer spoke with patient, following up from recent appointment with patient's PCP.  Pt stated she was about to start classes and wanted writer to reach out later this afternoon.    10/7/22 227P - Writer left another message with patient and gave scheduling line to schedule a NEW appointment.    DEANDRA Carter, [facility]  Behavioral Health Clinician  [facility]  [id number] , [place] [address]  [phone]

## 2022-10-10 PROCEDURE — 82274 ASSAY TEST FOR BLOOD FECAL: CPT | Performed by: PHYSICIAN ASSISTANT

## 2022-10-13 LAB — HEMOCCULT STL QL IA: NEGATIVE

## 2022-10-14 ENCOUNTER — MYC MEDICAL ADVICE (OUTPATIENT)
Dept: FAMILY MEDICINE | Facility: CLINIC | Age: 62
End: 2022-10-14

## 2022-10-17 NOTE — TELEPHONE ENCOUNTER
See [employer] message.    Routing to provider to advise.     Office visit 10/4/22. BP meds below              Arely Sandy RN

## 2022-10-20 ENCOUNTER — OFFICE VISIT (OUTPATIENT)
Dept: BEHAVIORAL HEALTH | Facility: CLINIC | Age: 62
End: 2022-10-20
Payer: COMMERCIAL

## 2022-10-20 DIAGNOSIS — F43.21 ADJUSTMENT DISORDER WITH DEPRESSED MOOD: ICD-10-CM

## 2022-10-20 DIAGNOSIS — F41.1 GENERALIZED ANXIETY DISORDER: Primary | ICD-10-CM

## 2022-10-20 PROCEDURE — 90837 PSYTX W PT 60 MINUTES: CPT

## 2022-10-20 ASSESSMENT — ANXIETY QUESTIONNAIRES
1. FEELING NERVOUS, ANXIOUS, OR ON EDGE: SEVERAL DAYS
8. IF YOU CHECKED OFF ANY PROBLEMS, HOW DIFFICULT HAVE THESE MADE IT FOR YOU TO DO YOUR WORK, TAKE CARE OF THINGS AT HOME, OR GET ALONG WITH OTHER PEOPLE?: SOMEWHAT DIFFICULT
GAD7 TOTAL SCORE: 9
3. WORRYING TOO MUCH ABOUT DIFFERENT THINGS: MORE THAN HALF THE DAYS
5. BEING SO RESTLESS THAT IT IS HARD TO SIT STILL: SEVERAL DAYS
7. FEELING AFRAID AS IF SOMETHING AWFUL MIGHT HAPPEN: MORE THAN HALF THE DAYS
4. TROUBLE RELAXING: SEVERAL DAYS
6. BECOMING EASILY ANNOYED OR IRRITABLE: SEVERAL DAYS
2. NOT BEING ABLE TO STOP OR CONTROL WORRYING: SEVERAL DAYS
GAD7 TOTAL SCORE: 9
7. FEELING AFRAID AS IF SOMETHING AWFUL MIGHT HAPPEN: MORE THAN HALF THE DAYS
GAD7 TOTAL SCORE: 9
IF YOU CHECKED OFF ANY PROBLEMS ON THIS QUESTIONNAIRE, HOW DIFFICULT HAVE THESE PROBLEMS MADE IT FOR YOU TO DO YOUR WORK, TAKE CARE OF THINGS AT HOME, OR GET ALONG WITH OTHER PEOPLE: SOMEWHAT DIFFICULT

## 2022-10-20 ASSESSMENT — PATIENT HEALTH QUESTIONNAIRE - PHQ9
10. IF YOU CHECKED OFF ANY PROBLEMS, HOW DIFFICULT HAVE THESE PROBLEMS MADE IT FOR YOU TO DO YOUR WORK, TAKE CARE OF THINGS AT HOME, OR GET ALONG WITH OTHER PEOPLE: NOT DIFFICULT AT ALL
SUM OF ALL RESPONSES TO PHQ QUESTIONS 1-9: 1
SUM OF ALL RESPONSES TO PHQ QUESTIONS 1-9: 1

## 2022-10-20 ASSESSMENT — COLUMBIA-SUICIDE SEVERITY RATING SCALE - C-SSRS
TOTAL  NUMBER OF INTERRUPTED ATTEMPTS LIFETIME: NO
1. HAVE YOU WISHED YOU WERE DEAD OR WISHED YOU COULD GO TO SLEEP AND NOT WAKE UP?: NO
ATTEMPT LIFETIME: NO
6. HAVE YOU EVER DONE ANYTHING, STARTED TO DO ANYTHING, OR PREPARED TO DO ANYTHING TO END YOUR LIFE?: NO
TOTAL  NUMBER OF ABORTED OR SELF INTERRUPTED ATTEMPTS LIFETIME: NO
2. HAVE YOU ACTUALLY HAD ANY THOUGHTS OF KILLING YOURSELF?: NO
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
## 2022-11-06 ENCOUNTER — MYC MEDICAL ADVICE (OUTPATIENT)
Dept: FAMILY MEDICINE | Facility: CLINIC | Age: 62
End: 2022-11-06

## 2023-01-05 ENCOUNTER — MYC MEDICAL ADVICE (OUTPATIENT)
Dept: FAMILY MEDICINE | Facility: CLINIC | Age: 63
End: 2023-01-05

## 2023-01-05 ENCOUNTER — OFFICE VISIT (OUTPATIENT)
Dept: FAMILY MEDICINE | Facility: CLINIC | Age: 63
End: 2023-01-05
Payer: COMMERCIAL

## 2023-01-05 VITALS
BODY MASS INDEX: 27.12 KG/M2 | SYSTOLIC BLOOD PRESSURE: 146 MMHG | DIASTOLIC BLOOD PRESSURE: 92 MMHG | TEMPERATURE: 98.9 F | HEIGHT: 65 IN | HEART RATE: 88 BPM | OXYGEN SATURATION: 98 %

## 2023-01-05 DIAGNOSIS — I10 BENIGN ESSENTIAL HYPERTENSION: ICD-10-CM

## 2023-01-05 PROCEDURE — 99213 OFFICE O/P EST LOW 20 MIN: CPT | Performed by: PHYSICIAN ASSISTANT

## 2023-01-05 RX ORDER — METOPROLOL SUCCINATE 50 MG/1
50 TABLET, EXTENDED RELEASE ORAL DAILY
Qty: 90 TABLET | Refills: 0 | Status: SHIPPED | OUTPATIENT
Start: 2023-01-05 | End: 2023-03-15

## 2023-01-05 ASSESSMENT — PAIN SCALES - GENERAL: PAINLEVEL: NO PAIN (0)

## 2023-01-05 NOTE — PROGRESS NOTES
"  Assessment & Plan     Benign essential hypertension  Increase the toprol to 50 mg daily  Continue lisinopril hydrochlorothiazide   Monitor at home and send [employer] message with readings.   - metoprolol succinate ER (TOPROL XL) 50 MG 24 hr tablet; Take 1 tablet (50 mg) by mouth daily                 Return in about 2 weeks (around 1/19/2023) for [employer] message.    Kristen M. Kehr, PA-C [facility] [place]    [place]   Jyoti is a 62 year old, presenting for the following health issues:  Hypertension    She has been monitoring her blood pressure at home.   Stress has improved. She went to counseling for a few times, but feeling much better about her daughter's situation.   BP is checked daily.   She is taking toprol xl 25 mg daily along with lisinopril hydrochlorothiazide 20/25 mg daily.     History of Present Illness       Hypertension: She presents for follow up of hypertension.  She does check blood pressure  regularly outside of the clinic. Outside blood pressures have been over 140/90. She follows a low salt diet.               Review of Systems   Constitutional, HEENT, cardiovascular, pulmonary, GI, , musculoskeletal, neuro, skin, endocrine and psych systems are negative, except as otherwise noted.      Objective    BP (!) 146/92   Pulse 88   Temp 98.9  F (37.2  C) (Tympanic)   Ht 1.651 m (5' 5\")   LMP 02/13/2013   SpO2 98%   BMI 27.12 kg/m    Body mass index is 27.12 kg/m .  Physical Exam   GENERAL: healthy, alert and no distress  PSYCH: mentation appears normal, affect normal/bright                    "

## 2023-01-05 NOTE — TELEPHONE ENCOUNTER
RN huddled with provider regarding provider message below. Per provider, pt may be overbooked on provider schedule today and will be worked in as able. RN contacted pt and notified her of provider plan and pt states she can arrive to clinic approximately 11am and understands the plan.    DORON QuachN, RN

## 2023-01-23 ENCOUNTER — MYC MEDICAL ADVICE (OUTPATIENT)
Dept: FAMILY MEDICINE | Facility: CLINIC | Age: 63
End: 2023-01-23
Payer: COMMERCIAL

## 2023-02-07 ENCOUNTER — TRANSFERRED RECORDS (OUTPATIENT)
Dept: HEALTH INFORMATION MANAGEMENT | Facility: CLINIC | Age: 63
End: 2023-02-07
Payer: COMMERCIAL

## 2023-02-07 LAB — RETINOPATHY: POSITIVE

## 2023-03-02 ENCOUNTER — MYC MEDICAL ADVICE (OUTPATIENT)
Dept: FAMILY MEDICINE | Facility: CLINIC | Age: 63
End: 2023-03-02
Payer: COMMERCIAL

## 2023-03-15 ENCOUNTER — OFFICE VISIT (OUTPATIENT)
Dept: FAMILY MEDICINE | Facility: CLINIC | Age: 63
End: 2023-03-15
Payer: COMMERCIAL

## 2023-03-15 VITALS
WEIGHT: 167 LBS | HEART RATE: 85 BPM | SYSTOLIC BLOOD PRESSURE: 136 MMHG | BODY MASS INDEX: 27.82 KG/M2 | OXYGEN SATURATION: 98 % | DIASTOLIC BLOOD PRESSURE: 84 MMHG | TEMPERATURE: 97.7 F | RESPIRATION RATE: 16 BRPM | HEIGHT: 65 IN

## 2023-03-15 DIAGNOSIS — E11.29 TYPE 2 DIABETES MELLITUS WITH MICROALBUMINURIA, WITHOUT LONG-TERM CURRENT USE OF INSULIN (H): Primary | ICD-10-CM

## 2023-03-15 DIAGNOSIS — I10 BENIGN ESSENTIAL HYPERTENSION: ICD-10-CM

## 2023-03-15 DIAGNOSIS — R80.9 TYPE 2 DIABETES MELLITUS WITH MICROALBUMINURIA, WITHOUT LONG-TERM CURRENT USE OF INSULIN (H): Primary | ICD-10-CM

## 2023-03-15 DIAGNOSIS — E03.9 HYPOTHYROIDISM, UNSPECIFIED TYPE: ICD-10-CM

## 2023-03-15 LAB — HBA1C MFR BLD: 6.6 % (ref 0–5.6)

## 2023-03-15 PROCEDURE — 99214 OFFICE O/P EST MOD 30 MIN: CPT | Performed by: PHYSICIAN ASSISTANT

## 2023-03-15 PROCEDURE — 83036 HEMOGLOBIN GLYCOSYLATED A1C: CPT | Performed by: PHYSICIAN ASSISTANT

## 2023-03-15 PROCEDURE — 36415 COLL VENOUS BLD VENIPUNCTURE: CPT | Performed by: PHYSICIAN ASSISTANT

## 2023-03-15 RX ORDER — LIRAGLUTIDE 6 MG/ML
INJECTION SUBCUTANEOUS
Qty: 27 ML | Refills: 3 | Status: SHIPPED | OUTPATIENT
Start: 2023-03-15 | End: 2023-11-27

## 2023-03-15 RX ORDER — LEVOTHYROXINE SODIUM 88 UG/1
88 TABLET ORAL DAILY
Qty: 90 TABLET | Refills: 1 | Status: SHIPPED | OUTPATIENT
Start: 2023-03-15 | End: 2023-10-09

## 2023-03-15 RX ORDER — METFORMIN HCL 500 MG
TABLET, EXTENDED RELEASE 24 HR ORAL
Qty: 360 TABLET | Refills: 1 | Status: SHIPPED | OUTPATIENT
Start: 2023-03-15 | End: 2023-07-24

## 2023-03-15 RX ORDER — LISINOPRIL AND HYDROCHLOROTHIAZIDE 20; 25 MG/1; MG/1
TABLET ORAL
Qty: 90 TABLET | Refills: 1 | Status: SHIPPED | OUTPATIENT
Start: 2023-03-15 | End: 2023-07-24

## 2023-03-15 RX ORDER — METOPROLOL SUCCINATE 50 MG/1
50 TABLET, EXTENDED RELEASE ORAL DAILY
Qty: 90 TABLET | Refills: 1 | Status: SHIPPED | OUTPATIENT
Start: 2023-03-15 | End: 2023-10-09

## 2023-03-15 ASSESSMENT — PAIN SCALES - GENERAL: PAINLEVEL: NO PAIN (0)

## 2023-03-15 NOTE — PROGRESS NOTES
Assessment & Plan     Type 2 diabetes mellitus with microalbuminuria, without long-term current use of insulin (H)  Continue with her current medications.   A1C is looking good.   - Hemoglobin A1c; Future  - Hemoglobin A1c  - lisinopril-hydrochlorothiazide (ZESTORETIC) 20-25 MG tablet; TAKE 1 TABLET BY MOUTH EVERY DAY **HOLD UNTIL REQUESTED**  - metFORMIN (GLUCOPHAGE XR) 500 MG 24 hr tablet; 2 tablets twice daily  - liraglutide (VICTOZA PEN) 18 MG/3ML solution; 1.8 mg under skin once daily    Hypothyroidism, unspecified type  Stable, refills given   - levothyroxine (SYNTHROID/LEVOTHROID) 88 MCG tablet; Take 1 tablet (88 mcg) by mouth daily    Benign essential hypertension  She is monitoring at home also.   Blood pressure is typically high in the office.   Excessive stressors at home with her adult children. She is trying to work on boundaries.   - lisinopril-hydrochlorothiazide (ZESTORETIC) 20-25 MG tablet; TAKE 1 TABLET BY MOUTH EVERY DAY **HOLD UNTIL REQUESTED**  - metoprolol succinate ER (TOPROL XL) 50 MG 24 hr tablet; Take 1 tablet (50 mg) by mouth daily        Fasting tests ordered for her next appointment         Return in about 6 months (around 9/15/2023) for Routine Visit, diabetes, fasting lab appointment.    Kristen M. Kehr, PA-C  [facility]   Jyoti is a 63 year old, presenting for the following health issues:  Diabetes and Hypertension      HPI     Diabetes Follow-up      How often are you checking your blood sugar? In the morning- varies    What concerns do you have today about your diabetes?      Do you have any of these symptoms? (Select all that apply)        BP Readings from Last 2 Encounters:   03/15/23 136/84   01/05/23 (!) 146/92     Hemoglobin A1C (%)   Date Value   03/15/2023 6.6 (H)   09/27/2022 6.3 (H)   05/27/2021 7.4 (H)   11/19/2020 7.3 (H)     LDL Cholesterol Calculated (mg/dL)   Date Value   09/27/2022 167 (H)   11/18/2021 170 (H)   11/19/2020      "Cannot estimate LDL when triglyceride exceeds 400 mg/dL   10/28/2019 101 (H)     LDL Cholesterol Direct (mg/dL)   Date Value   11/19/2020 159 (H)               Hypertension Follow-up      Do you check your blood pressure regularly outside of the clinic? Yes     Are you following a low salt diet?     Are your blood pressures ever more than 140 on the top number (systolic) OR more   than 90 on the bottom number (diastolic), for example 140/90?           Review of Systems   Constitutional, HEENT, cardiovascular, pulmonary, GI, , musculoskeletal, neuro, skin, endocrine and psych systems are negative, except as otherwise noted.      Objective    /84   Pulse 85   Temp 97.7  F (36.5  C) (Tympanic)   Resp 16   Ht 1.651 m (5' 5\")   Wt 75.8 kg (167 lb)   LMP 02/13/2013   SpO2 98%   BMI 27.79 kg/m    Body mass index is 27.79 kg/m .  Physical Exam   GENERAL: healthy, alert and no distress  PSYCH: affect normal/bright, anxious and judgement and insight intact    Results for orders placed or performed in visit on 03/15/23 (from the past 24 hour(s))   Hemoglobin A1c   Result Value Ref Range    Hemoglobin A1C 6.6 (H) 0.0 - 5.6 %                   "

## 2023-05-23 ENCOUNTER — PATIENT OUTREACH (OUTPATIENT)
Dept: CARE COORDINATION | Facility: CLINIC | Age: 63
End: 2023-05-23
Payer: COMMERCIAL

## 2023-06-03 ENCOUNTER — HEALTH MAINTENANCE LETTER (OUTPATIENT)
Age: 63
End: 2023-06-03

## 2023-07-22 DIAGNOSIS — I10 BENIGN ESSENTIAL HYPERTENSION: ICD-10-CM

## 2023-07-22 DIAGNOSIS — E11.29 TYPE 2 DIABETES MELLITUS WITH MICROALBUMINURIA, WITHOUT LONG-TERM CURRENT USE OF INSULIN (H): ICD-10-CM

## 2023-07-22 DIAGNOSIS — R80.9 TYPE 2 DIABETES MELLITUS WITH MICROALBUMINURIA, WITHOUT LONG-TERM CURRENT USE OF INSULIN (H): ICD-10-CM

## 2023-07-24 ENCOUNTER — OFFICE VISIT (OUTPATIENT)
Dept: AUDIOLOGY | Facility: OTHER | Age: 63
End: 2023-07-24
Payer: COMMERCIAL

## 2023-07-24 DIAGNOSIS — H90.6 MIXED HEARING LOSS, BILATERAL: Primary | ICD-10-CM

## 2023-07-24 PROCEDURE — 92591 PR HEARING AID EXAM BINAURAL: CPT | Performed by: AUDIOLOGIST

## 2023-07-24 PROCEDURE — 92557 COMPREHENSIVE HEARING TEST: CPT | Performed by: AUDIOLOGIST

## 2023-07-24 PROCEDURE — 99207 PR NO CHARGE LOS: CPT | Performed by: AUDIOLOGIST

## 2023-07-24 PROCEDURE — 92550 TYMPANOMETRY & REFLEX THRESH: CPT | Performed by: AUDIOLOGIST

## 2023-07-24 RX ORDER — METFORMIN HCL 500 MG
TABLET, EXTENDED RELEASE 24 HR ORAL
Qty: 360 TABLET | Refills: 0 | Status: SHIPPED | OUTPATIENT
Start: 2023-07-24 | End: 2023-10-13

## 2023-07-24 RX ORDER — LISINOPRIL AND HYDROCHLOROTHIAZIDE 20; 25 MG/1; MG/1
TABLET ORAL
Qty: 90 TABLET | Refills: 0 | Status: SHIPPED | OUTPATIENT
Start: 2023-07-24 | End: 2023-10-13

## 2023-07-24 NOTE — PROGRESS NOTES
AUDIOLOGY REPORT    SUBJECTIVE:  Amarilis Vazquez is a 63 year old female who was seen in the Audiology Clinic at the [facility] for audiologic evaluation, referred byself. The patient has been seen previously in this clinic on 7/6/2020 for assessment and results indicated mild to moderate rising to normal essentially sensorineural hearing loss in the right ear and mild to moderately severe essentially sensorineural hearing loss in the left ear. The patient reports a that she has not noted major changes in hearing. She reports that she had three sets of PE tubes as a child, and her father also had ear problems. The patient reports that she does not have tinnitus, ear pain, or ear pressure. She wears binaural hearing aids. The patient notes difficulty with communication in a variety of listening situations. The patient was unaccompanied to today's appointment.     OBJECTIVE:  Otoscopic exam indicates ears are clear of cerumen bilaterally     Pure Tone Thresholds assessed using conventional audiometry with good  reliability from 250-8000 Hz bilaterally using insert earphones and circumaural headphones     RIGHT:  mild to moderately severe rising to mild mixed hearing loss    LEFT:    moderate to moderately severe mixed hearing loss    Tympanogram:    RIGHT: normal eardrum mobility    LEFT:   negative pressure with a bifid peak    Reflexes (reported by stimulus ear):  RIGHT: Ipsilateral is absent at frequencies tested  RIGHT: Contralateral is absent at frequencies tested  LEFT:   Ipsilateral is absent at frequencies tested  LEFT:   Contralateral is absent at frequencies tested    Speech Reception Threshold:    RIGHT: 40 dB HL    LEFT:   50 dB HL    Word Recognition Score:     RIGHT: 100% at 85 dB HL using NU-6 recorded word list.    LEFT:   100% at 90 dB HL using NU-6 recorded word list.    Patient is a hearing aid candidate. Patient would like to move forward with a hearing aid evaluation today.  Therefore, the patient was presented with different options for amplification to help aid in communication. Discussed styles, levels of technology and monaural vs. binaural fitting.     The hearing aid(s) mutually chosen were:  Binaural: Phonak Audeo L30-R  COLOR: sandalwood  BATTERY SIZE: rechargeable  EARMOLD/TIPS: medium closed domes  CANAL/ LENGTH: 1M      ASSESSMENT:     ICD-10-CM    1. Mixed hearing loss, bilateral  H90.6 Cmprhn Audiometry Thrshld Eval & Speech Recog (40728)     Tymps / Reflex   ([id number])     Hearing Aid Exam, Binaural ([id number])        Compared to patient's previous audiogram dated 7/6/2020, thresholds today are 10 dB poorer in the right ear at 1000 and 8000 Hz and in the left ear at 1000 and 6000 Hz. All other tested frequencies were stable.     Reviewed purchase information and warranty information with patient. The 45 day trial period was explained to patient. The patient was given a copy of the [place] Department [employer] consumer brochure on purchasing hearing instruments. Patient risk factors have been provided to the patient in writing prior to the sale of the hearing aid per FDA regulation. The risk factors are also available in the User Instructional Booklet to be presented on the day of the hearing aid fitting. Hearing aid(s) ordered. Hearing aid evaluation completed.Today s results were discussed with the patient in detail.     PLAN:  Patient was counseled regarding hearing loss and impact on communication. Amarilis is scheduled to returnfor a hearing aid fitting and programming appointment. Purchase agreement will be completed on that date. Discussed the option for ENT follow up if the conductive component continues to increase or if any other ear symptoms develop. Please call this clinic with questions regarding these results or recommendations.      Lottie Hernandez., CCC-A  [place] Licensed Audiologist #[id number]  7/24/2023

## 2023-08-17 ENCOUNTER — OFFICE VISIT (OUTPATIENT)
Dept: AUDIOLOGY | Facility: OTHER | Age: 63
End: 2023-08-17
Payer: COMMERCIAL

## 2023-08-17 DIAGNOSIS — H90.6 MIXED HEARING LOSS, BILATERAL: Primary | ICD-10-CM

## 2023-08-17 PROCEDURE — V5160 DISPENSING FEE BINAURAL: HCPCS | Performed by: AUDIOLOGIST

## 2023-08-17 PROCEDURE — V5261 HEARING AID, DIGIT, BIN, BTE: HCPCS | Performed by: AUDIOLOGIST

## 2023-08-17 PROCEDURE — 92593 PR HEARING AID CHECK, BINAURAL: CPT | Performed by: AUDIOLOGIST

## 2023-08-17 PROCEDURE — V5020 CONFORMITY EVALUATION: HCPCS | Mod: RT | Performed by: AUDIOLOGIST

## 2023-08-17 PROCEDURE — V5011 HEARING AID FITTING/CHECKING: HCPCS | Mod: RT | Performed by: AUDIOLOGIST

## 2023-08-17 NOTE — PATIENT INSTRUCTIONS

## 2023-08-17 NOTE — PROGRESS NOTES
AUDIOLOGY REPORT    SUBJECTIVE: Amarilis Vazquez, a 63 year old female, was seen in the Audiology Clinic at [facility] today for a Binaural hearing aid fitting. Previous results have revealed a bilateral mixed hearing loss. The patient has worn hearing aids before.    OBJECTIVE:  Prior to fitting, a hearing aid check was performed to ensure device functionality. The hearing aid conformity evaluation was completed.The hearing aids were placed and they provided a good fit. Real-ear-probe-microphone measurements were completed on the [employer] system and were a good match to NAL-NL2 target with soft sounds audible, moderate sounds comfortable, and loud sounds below discomfort. UCLs are verified through maximum power output measures and demonstrate appropriate limiting of loud inputs. Ms. Vazquez was oriented to proper hearing aid use, care, cleaning (no water, dry brush), batteries (rechargeable,  use, low-battery signal), aid insertion/removal, user booklet, warranty information, storage cases, and other hearing aid details. The patient confirmed understanding of hearing aid use and care, and showed proper insertion of hearing aid and batteries while in the office today. Ms. Vazquez reported good volume and sound quality today. The hearing aids were paired to the patient's phone.    EAR(S) FIT: Binaural  HEARING AID MAKE: Right: Phonak; Left: Phonak    HEARING AID MODEL #: Right: Audeo L30-R; Left: Audeo L30-R  HEARING AID STYLE: Right: ROBEL; Left: ROBEL  DOME SIZE: Right:  medium closed; Left::  medium closed   LENGTH: Right:  1M; Left:  1M  SERIAL NUMBERS: Right: [id number]; Left: [id number]  WARRANTY END DATE: Right: 10/21/2026; Left:: 10/21/2026    ASSESSMENT: Binaural hearing aid fitting completed today. Verification measures were performed. The 45 day trial period was explained to patient, and they expressed understanding. Ms. Vazquez signed the Hearing Aid Purchase Agreement and was given  a copy, as well as details on her hearing aids. Patient was counseled that exact out of pocket amounts cannot be determined for hearing aid claims being sent to insurance. Any insurance coverage information presented to the patient is an estimate only, and is not a guarantee of payment. Patient has been advised to check with their own insurance.    PLAN: Ms. Vazquez will return for follow-up in 2-3 weeks for a hearing aid review appointment. Please call this clinic with questions regarding today s appointment.    Ru Hernandez, CCC-A  [place] Licensed Audiologist #[id number]  8/17/2023

## 2023-09-07 ENCOUNTER — OFFICE VISIT (OUTPATIENT)
Dept: AUDIOLOGY | Facility: OTHER | Age: 63
End: 2023-09-07
Payer: COMMERCIAL

## 2023-09-07 DIAGNOSIS — H90.6 MIXED HEARING LOSS, BILATERAL: Primary | ICD-10-CM

## 2023-09-07 PROCEDURE — V5299 HEARING SERVICE: HCPCS | Performed by: AUDIOLOGIST

## 2023-09-07 NOTE — PROGRESS NOTES
AUDIOLOGY REPORT    SUBJECTIVE:Amarilis Vazquez is a 63 year old female who was seen in the Audiology Clinic at the [facility] on 9/7/2023  for a follow-up check regarding the fitting of new hearing aids. Previous results have revealed bilateral mixed hearing loss.  The patient has been seen previously in this clinic and was fit with binaural Phonak Audeo L30-R hearing aids on 8/17/2023.  Amarilis reports that the sound from the new hearing aids has been uncomfortable. She notes that her own voice is particularly bothersome and she has been hearing tones from the hearing aids. The patient has been wearing her old hearing aids.    OBJECTIVE:   The International Outcome Inventory-Hearing Aids (IOI-HA) was not administered today to give the patient more time to adjust to the new hearing aids.    It was noted that the patient's previous hearing aids were set to a 90% adaptation level. The new hearing aids were reduced to 90% and the patient noted improved sound. She still noted that her own voice did not sound right, so gain for 250 and 500 Hz was reduced by two steps for both ears, and the patient reported improved sound. Discussed that the tones she had heard may have been feedback, which should be less of a problem with decreased gain.    No charge visit today (in warranty hearing aid check).    ASSESSMENT: A follow-up appointment for hearing aid fitting was completed today. Changes to hearing aid was completed as outlined above.     PLAN:Amarilis will return for follow-up as needed, or at least every 9-12 months for cleaning and assessment of hearing aid.  The IOI-HA should be completed at a future visit. Please call this clinic with any questions regarding today s appointment.    Ru Hernandez, CCC-A  [place] Licensed Audiologist #[id number]  9/7/2023

## 2023-09-30 ENCOUNTER — HEALTH MAINTENANCE LETTER (OUTPATIENT)
Age: 63
End: 2023-09-30

## 2023-10-08 DIAGNOSIS — I10 BENIGN ESSENTIAL HYPERTENSION: ICD-10-CM

## 2023-10-08 DIAGNOSIS — E03.9 HYPOTHYROIDISM, UNSPECIFIED TYPE: ICD-10-CM

## 2023-10-09 RX ORDER — METOPROLOL SUCCINATE 50 MG/1
50 TABLET, EXTENDED RELEASE ORAL DAILY
Qty: 90 TABLET | Refills: 1 | Status: SHIPPED | OUTPATIENT
Start: 2023-10-09 | End: 2023-11-27

## 2023-10-09 RX ORDER — LEVOTHYROXINE SODIUM 88 UG/1
88 TABLET ORAL DAILY
Qty: 90 TABLET | Refills: 0 | Status: SHIPPED | OUTPATIENT
Start: 2023-10-09 | End: 2023-11-27

## 2023-10-13 DIAGNOSIS — R80.9 TYPE 2 DIABETES MELLITUS WITH MICROALBUMINURIA, WITHOUT LONG-TERM CURRENT USE OF INSULIN (H): ICD-10-CM

## 2023-10-13 DIAGNOSIS — E11.29 TYPE 2 DIABETES MELLITUS WITH MICROALBUMINURIA, WITHOUT LONG-TERM CURRENT USE OF INSULIN (H): ICD-10-CM

## 2023-10-13 DIAGNOSIS — E03.9 HYPOTHYROIDISM, UNSPECIFIED TYPE: ICD-10-CM

## 2023-10-13 DIAGNOSIS — I10 BENIGN ESSENTIAL HYPERTENSION: ICD-10-CM

## 2023-10-13 RX ORDER — METFORMIN HCL 500 MG
TABLET, EXTENDED RELEASE 24 HR ORAL
Qty: 360 TABLET | Refills: 0 | Status: SHIPPED | OUTPATIENT
Start: 2023-10-13 | End: 2023-11-27

## 2023-10-13 RX ORDER — LISINOPRIL AND HYDROCHLOROTHIAZIDE 20; 25 MG/1; MG/1
TABLET ORAL
Qty: 90 TABLET | Refills: 0 | Status: SHIPPED | OUTPATIENT
Start: 2023-10-13 | End: 2023-11-27

## 2023-10-13 RX ORDER — LEVOTHYROXINE SODIUM 88 UG/1
88 TABLET ORAL DAILY
Qty: 90 TABLET | Refills: 0 | OUTPATIENT
Start: 2023-10-13

## 2023-11-02 ENCOUNTER — LAB (OUTPATIENT)
Dept: LAB | Facility: CLINIC | Age: 63
End: 2023-11-02
Payer: COMMERCIAL

## 2023-11-02 DIAGNOSIS — E11.29 TYPE 2 DIABETES MELLITUS WITH MICROALBUMINURIA, WITHOUT LONG-TERM CURRENT USE OF INSULIN (H): ICD-10-CM

## 2023-11-02 DIAGNOSIS — E03.9 HYPOTHYROIDISM, UNSPECIFIED TYPE: ICD-10-CM

## 2023-11-02 DIAGNOSIS — I10 BENIGN ESSENTIAL HYPERTENSION: ICD-10-CM

## 2023-11-02 DIAGNOSIS — R80.9 TYPE 2 DIABETES MELLITUS WITH MICROALBUMINURIA, WITHOUT LONG-TERM CURRENT USE OF INSULIN (H): ICD-10-CM

## 2023-11-02 LAB
ANION GAP SERPL CALCULATED.3IONS-SCNC: 13 MMOL/L (ref 7–15)
BUN SERPL-MCNC: 13 MG/DL (ref 8–23)
CALCIUM SERPL-MCNC: 10 MG/DL (ref 8.8–10.2)
CHLORIDE SERPL-SCNC: 97 MMOL/L (ref 98–107)
CHOLEST SERPL-MCNC: 317 MG/DL
CREAT SERPL-MCNC: 0.71 MG/DL (ref 0.51–0.95)
CREAT UR-MCNC: 84.1 MG/DL
DEPRECATED HCO3 PLAS-SCNC: 27 MMOL/L (ref 22–29)
EGFRCR SERPLBLD CKD-EPI 2021: >90 ML/MIN/1.73M2
GLUCOSE SERPL-MCNC: 144 MG/DL (ref 70–99)
HBA1C MFR BLD: 6.9 % (ref 0–5.6)
HDLC SERPL-MCNC: 39 MG/DL
LDLC SERPL CALC-MCNC: ABNORMAL MG/DL
LDLC SERPL DIRECT ASSAY-MCNC: 205 MG/DL
MICROALBUMIN UR-MCNC: <12 MG/L
MICROALBUMIN/CREAT UR: NORMAL MG/G{CREAT}
NONHDLC SERPL-MCNC: 278 MG/DL
POTASSIUM SERPL-SCNC: 4.4 MMOL/L (ref 3.4–5.3)
SODIUM SERPL-SCNC: 137 MMOL/L (ref 135–145)
TRIGL SERPL-MCNC: 421 MG/DL
TSH SERPL DL<=0.005 MIU/L-ACNC: 2.14 UIU/ML (ref 0.3–4.2)

## 2023-11-02 PROCEDURE — 83721 ASSAY OF BLOOD LIPOPROTEIN: CPT | Mod: 59

## 2023-11-02 PROCEDURE — 36415 COLL VENOUS BLD VENIPUNCTURE: CPT

## 2023-11-02 PROCEDURE — 82570 ASSAY OF URINE CREATININE: CPT

## 2023-11-02 PROCEDURE — 82043 UR ALBUMIN QUANTITATIVE: CPT

## 2023-11-02 PROCEDURE — 83036 HEMOGLOBIN GLYCOSYLATED A1C: CPT

## 2023-11-02 PROCEDURE — 84443 ASSAY THYROID STIM HORMONE: CPT

## 2023-11-02 PROCEDURE — 80048 BASIC METABOLIC PNL TOTAL CA: CPT

## 2023-11-02 PROCEDURE — 80061 LIPID PANEL: CPT

## 2023-11-03 ENCOUNTER — MYC MEDICAL ADVICE (OUTPATIENT)
Dept: FAMILY MEDICINE | Facility: CLINIC | Age: 63
End: 2023-11-03
Payer: COMMERCIAL

## 2023-11-15 ENCOUNTER — TELEPHONE (OUTPATIENT)
Dept: FAMILY MEDICINE | Facility: CLINIC | Age: 63
End: 2023-11-15
Payer: COMMERCIAL

## 2023-11-15 NOTE — TELEPHONE ENCOUNTER
See [employer] message 11/15/23.  Pt needs a prior auth for Victoza. Please start one.  Mary SALN, RN

## 2023-11-17 NOTE — TELEPHONE ENCOUNTER
Prior Authorization Approval    Medication: VICTOZA 18 MG/3ML SC SOPN  Authorization Effective Date: 1/1/2024  Authorization Expiration Date:  12/31/2024  Approved Dose/Quantity:   Reference #:     Insurance Company: [employer] [phone] Fax [phone]  Expected CoPay: $    CoPay Card Available:      Financial Assistance Needed: No  Which Pharmacy is filling the prescription: [id number] [place] [address]  Pharmacy Notified: Yes  Patient Notified: Pharmacy will notify patient.  Received approval via phone .        PA Initiation    Medication: VICTOZA 18 MG/3ML SC SOPN  Insurance Company: [employer] [phone] Fax [phone]  Pharmacy Filling the Rx[id number] [place] [address]  Filling Pharmacy Phone: [phone]  Filling Pharmacy Fax:    Start Date: 11/17/2023  Central Prior Authorization Team   Phone: [phone]

## 2023-11-23 ENCOUNTER — HOSPITAL ENCOUNTER (EMERGENCY)
Facility: CLINIC | Age: 63
Discharge: HOME | End: 2023-11-23
Attending: FAMILY MEDICINE | Admitting: FAMILY MEDICINE
Payer: COMMERCIAL

## 2023-11-23 VITALS
RESPIRATION RATE: 7 BRPM | OXYGEN SATURATION: 96 % | DIASTOLIC BLOOD PRESSURE: 83 MMHG | SYSTOLIC BLOOD PRESSURE: 142 MMHG | HEART RATE: 70 BPM

## 2023-11-23 DIAGNOSIS — I10 HYPERTENSION, UNSPECIFIED TYPE: ICD-10-CM

## 2023-11-23 LAB
ALBUMIN SERPL BCG-MCNC: 4.3 G/DL (ref 3.5–5.2)
ALBUMIN UR-MCNC: 30 MG/DL
ALP SERPL-CCNC: 62 U/L (ref 40–150)
ALT SERPL W P-5'-P-CCNC: 20 U/L (ref 0–50)
ANION GAP SERPL CALCULATED.3IONS-SCNC: 13 MMOL/L (ref 7–15)
APPEARANCE UR: CLEAR
AST SERPL W P-5'-P-CCNC: 16 U/L (ref 0–45)
BASOPHILS # BLD AUTO: 0.1 10E3/UL (ref 0–0.2)
BASOPHILS NFR BLD AUTO: 1 %
BILIRUB SERPL-MCNC: 0.5 MG/DL
BILIRUB UR QL STRIP: NEGATIVE
BUN SERPL-MCNC: 15.2 MG/DL (ref 8–23)
CALCIUM SERPL-MCNC: 9.6 MG/DL (ref 8.8–10.2)
CHLORIDE SERPL-SCNC: 99 MMOL/L (ref 98–107)
COLOR UR AUTO: ABNORMAL
CREAT SERPL-MCNC: 0.69 MG/DL (ref 0.51–0.95)
DEPRECATED HCO3 PLAS-SCNC: 25 MMOL/L (ref 22–29)
EGFRCR SERPLBLD CKD-EPI 2021: >90 ML/MIN/1.73M2
EOSINOPHIL # BLD AUTO: 0.4 10E3/UL (ref 0–0.7)
EOSINOPHIL NFR BLD AUTO: 4 %
ERYTHROCYTE [DISTWIDTH] IN BLOOD BY AUTOMATED COUNT: 13.8 % (ref 10–15)
GLUCOSE SERPL-MCNC: 198 MG/DL (ref 70–99)
GLUCOSE UR STRIP-MCNC: NEGATIVE MG/DL
HCT VFR BLD AUTO: 41.1 % (ref 35–47)
HGB BLD-MCNC: 13.5 G/DL (ref 11.7–15.7)
HGB UR QL STRIP: NEGATIVE
IMM GRANULOCYTES # BLD: 0 10E3/UL
IMM GRANULOCYTES NFR BLD: 0 %
KETONES UR STRIP-MCNC: NEGATIVE MG/DL
LEUKOCYTE ESTERASE UR QL STRIP: NEGATIVE
LYMPHOCYTES # BLD AUTO: 2.9 10E3/UL (ref 0.8–5.3)
LYMPHOCYTES NFR BLD AUTO: 29 %
MCH RBC QN AUTO: 29.5 PG (ref 26.5–33)
MCHC RBC AUTO-ENTMCNC: 32.8 G/DL (ref 31.5–36.5)
MCV RBC AUTO: 90 FL (ref 78–100)
MONOCYTES # BLD AUTO: 0.7 10E3/UL (ref 0–1.3)
MONOCYTES NFR BLD AUTO: 7 %
MUCOUS THREADS #/AREA URNS LPF: PRESENT /LPF
NEUTROPHILS # BLD AUTO: 5.8 10E3/UL (ref 1.6–8.3)
NEUTROPHILS NFR BLD AUTO: 59 %
NITRATE UR QL: NEGATIVE
NRBC # BLD AUTO: 0 10E3/UL
NRBC BLD AUTO-RTO: 0 /100
NT-PROBNP SERPL-MCNC: 220 PG/ML (ref 0–900)
PH UR STRIP: 7 [PH] (ref 5–7)
PLATELET # BLD AUTO: 297 10E3/UL (ref 150–450)
POTASSIUM SERPL-SCNC: 3.8 MMOL/L (ref 3.4–5.3)
PROT SERPL-MCNC: 7.1 G/DL (ref 6.4–8.3)
RBC # BLD AUTO: 4.57 10E6/UL (ref 3.8–5.2)
RBC URINE: 1 /HPF
SODIUM SERPL-SCNC: 137 MMOL/L (ref 135–145)
SP GR UR STRIP: 1.01 (ref 1–1.03)
SQUAMOUS EPITHELIAL: <1 /HPF
TROPONIN T SERPL HS-MCNC: <6 NG/L
UROBILINOGEN UR STRIP-MCNC: NORMAL MG/DL
WBC # BLD AUTO: 10.1 10E3/UL (ref 4–11)
WBC URINE: 0 /HPF

## 2023-11-23 PROCEDURE — 36415 COLL VENOUS BLD VENIPUNCTURE: CPT | Performed by: FAMILY MEDICINE

## 2023-11-23 PROCEDURE — 99284 EMERGENCY DEPT VISIT MOD MDM: CPT

## 2023-11-23 PROCEDURE — 80053 COMPREHEN METABOLIC PANEL: CPT | Performed by: FAMILY MEDICINE

## 2023-11-23 PROCEDURE — 99283 EMERGENCY DEPT VISIT LOW MDM: CPT | Performed by: FAMILY MEDICINE

## 2023-11-23 PROCEDURE — 85025 COMPLETE CBC W/AUTO DIFF WBC: CPT | Performed by: FAMILY MEDICINE

## 2023-11-23 PROCEDURE — 81001 URINALYSIS AUTO W/SCOPE: CPT | Performed by: FAMILY MEDICINE

## 2023-11-23 PROCEDURE — 84484 ASSAY OF TROPONIN QUANT: CPT | Performed by: FAMILY MEDICINE

## 2023-11-23 PROCEDURE — 83880 ASSAY OF NATRIURETIC PEPTIDE: CPT | Performed by: FAMILY MEDICINE

## 2023-11-23 ASSESSMENT — ACTIVITIES OF DAILY LIVING (ADL): ADLS_ACUITY_SCORE: 35

## 2023-11-24 ENCOUNTER — TELEPHONE (OUTPATIENT)
Dept: FAMILY MEDICINE | Facility: CLINIC | Age: 63
End: 2023-11-24
Payer: COMMERCIAL

## 2023-11-24 RX ORDER — ELDERBERRY FRUIT 50 MG/5 ML
560 SYRUP ORAL DAILY
COMMUNITY
Start: 2023-11-24

## 2023-11-24 RX ORDER — OMEGA-3 FATTY ACIDS/FISH OIL 360-1200MG
1 CAPSULE ORAL DAILY
COMMUNITY
Start: 2023-11-24

## 2023-11-24 RX ORDER — LANCETS
EACH MISCELLANEOUS
COMMUNITY
Start: 2023-11-24

## 2023-11-24 RX ORDER — SAW/PYGEUM/BETA/HERB/D3/B6/ZN 30 MG-25MG
600 CAPSULE ORAL DAILY
COMMUNITY
Start: 2023-11-24

## 2023-11-24 RX ORDER — GLUCOSAMINE/METHYLSULFONYLMETH 750-750 MG
1500 TABLET ORAL DAILY
COMMUNITY
Start: 2023-11-24

## 2023-11-24 RX ORDER — VITAMIN E (DL,TOCOPHERYL ACET) 45 MG/0.25
1 DROPS ORAL DAILY
COMMUNITY
Start: 2023-11-24

## 2023-11-24 RX ORDER — UBIDECARENONE 30 MG/5 ML
150 LIQUID (ML) ORAL DAILY
COMMUNITY
Start: 2023-11-24

## 2023-11-24 RX ORDER — OMEGA-3S/DHA/EPA/FISH OIL 1000-1400
1 CAPSULE,DELAYED RELEASE (ENTERIC COATED) ORAL DAILY
COMMUNITY
Start: 2023-11-24

## 2023-11-24 NOTE — DISCHARGE INSTRUCTIONS
We did not find any specific cause for your poorly controlled hypertension.  This may be due to several factors including increased salt intake, and life stressors.  Continue to monitor for further symptoms as well as record your blood pressures over the next several days.  Follow-up with your primary care provider in 5-7 days if your blood pressure remains elevated.  Return to the emergency department if you are developing any symptoms of chest pain, shortness of breath, or strokelike symptoms.  Consider discontinuing your tincture if your blood pressure remains high over the next few days.

## 2023-11-24 NOTE — TELEPHONE ENCOUNTER
"Routed to PCP to review prior to 11/27/23 appointment: Pt's medication list was updated to include all her her supplements that she takes. Pt would like provider to review if her current supplements should be adjusted, and advise pt at upcoming appointment for ED/Hospital follow-up, or consider placing [facility] Pharmacist Referral for Pharmacist review.      What type of discharge? Emergency Department  Risk of Hospital admission or ED visit: 33.1%  Is a TCM episode required? No  When should the patient follow up with PCP? 14 days of discharge., or per discharge plan (f/u in 5 days per ED discharge plan)      ED for acute condition Discharge Protocol  \"Hi, my name is Nanette Wise RN, a registered nurse, and I am calling from [facility].  I am calling to follow up and see how things are going for you after your recent emergency visit.\"    Tell me how you are doing now that you are home?\" Pt states she has a headache today and will take acetaminophen, but BP is better today than prior to going to ED.    Discharge Instructions  \"Let's review your discharge instructions.  What is/are the follow-up recommendations?  Pt. Response: f/u in 5 days    \"Has an appointment with your primary care provider been scheduled?\"  No - appointment scheduled by RN at time of call. Medication list updated.    Medications  \"Tell me what changed about your medicines when you discharged?\"    none    \"What questions do you have about your medications?\"   Pt only has questions about her OTC supplements and which ones are most beneficial. Medication list updated and will be reviewed by provider at upcoming appointment. - Can not be easily answered - schedule PCP appointment or place Epic MT referral        Call Summary  \"What questions or concerns do you have about your recent visit and your follow-up care?\"     none    \"If you have questions or things don't continue to improve, we encourage you contact us through the main " "clinic number (give number).  Even if the clinic is not open, triage nurses are available 24/7 to help you.     We would like you to know that our clinic has extended hours (provide information).  We also have urgent care (provide details on closest location and hours/contact info)\"    \"Thank you for your time and take care!\"    Nanette Wise RN  [facility]    "

## 2023-11-24 NOTE — ED PROVIDER NOTES
[facility] ED Provider Note   Patient: Amarilis Vazquez  MRN #:  [id number]  Date of Visit: November 23, 2023    CC:     Chief Complaint   Patient presents with    Hypertension     HPI:  Amarilis Vazquez is a 63 year old female who presented to the emergency department with elevated blood pressure readings of 200 systolic this evening.  Patient noticed that her blood pressure is starting to rise since this morning.  She usually takes her blood pressures in the morning and it was over 160 systolic.  Over the past week her blood pressures have been in the range of 118-140s.  Patient has been adding a tincture that consists of Yarrow and an alcohol in the form of a dropper that she puts under tongue every morning.  She has been doing this for the past week.  She takes most of her medications in the morning including her metoprolol and lisinopril hydrochlorothiazide.  She takes her metformin in the evening.  Patient developed a headache this evening and some pressure behind the eyes.  She denies any blurred vision, speech difficulty, facial or unilateral numbness or weakness, or speech difficulty.  She has no chest pain, shortness of breath, palpitations, abdominal pain, ankle or leg swelling.  Patient did have some ham last night and today.  Otherwise she has been eating more pretzels and hummus.  She has been going through a lot more stress lately with her daughter who had a severe motorcycle accident and lost the limb.  Last week, she developed infective endocarditis, and is not talking to her mother because she did not rushed down to [place] to drive her to the emergency department.  Patient denies any tobacco or alcohol use otherwise.  She tried to rest most of the day to see if her blood pressure would improve.    Problem List:  Patient Active Problem List    Diagnosis Date Noted    Chronic kidney disease, stage 1 03/18/2022     Priority: Medium     Insomnia, unspecified type 07/17/2018     Priority: Medium    Type 2 diabetes mellitus with microalbuminuria, without long-term current use of insulin (H) 07/10/2017     Priority: Medium    Advanced directives, counseling/discussion 05/10/2016     Priority: Medium     Information given to patient.    TIA Payne MA        Coronary artery disease involving native coronary artery of native heart without angina pectoris 02/04/2016     Priority: Medium    Mixed hyperlipidemia 02/04/2016     Priority: Medium    CAD (coronary artery disease) 05/08/2013     Priority: Medium     Follows with [facility]  4/23/2013-Coronary angiogram showing 70% R coronary artery lesion with normal stress test and flow reserve, thus, intervention is not recommended at this time.   Requires aggressive risk factor modification.      Benign essential hypertension 01/27/2012     Priority: Medium    Hypothyroidism 01/27/2012     Priority: Medium       Past Medical History:   Diagnosis Date    Coronary artery disease     Diabetic eye exam (H) 04/12/11    Diabetic eye exam (H) 01/28/12, 1/6/14    Other and unspecified hyperlipidemia     Type II or unspecified type diabetes mellitus without mention of complication, not stated as uncontrolled     Unspecified essential hypertension     Unspecified hypothyroidism        MEDS: aspirin (ASA) 81 MG EC tablet  levothyroxine (SYNTHROID/LEVOTHROID) 88 MCG tablet  liraglutide (VICTOZA PEN) 18 MG/3ML solution  lisinopril-hydrochlorothiazide (ZESTORETIC) 20-25 MG tablet  metFORMIN (GLUCOPHAGE XR) 500 MG 24 hr tablet  metoprolol succinate ER (TOPROL XL) 50 MG 24 hr tablet  TURMERIC PO  Alpha Lipoic Acid 200 MG CAPS  Coenzyme Q10 (CO Q 10 PO)  Continuous Blood Gluc  (FREESTYLE KANA 14 DAY READER) CRISTAL  Continuous Blood Gluc Sensor (FREESTYLE KANA 14 DAY SENSOR) MISC  cyanocobalamin 1000 MCG SUBL sublingual tablet  Flaxseed, Linseed, (FLAXSEED OIL) 1000 MG CAPS  insulin pen needle 32G X 4  MM        ALLERGIES:  No Known Allergies    Past Surgical History:   Procedure Laterality Date    D & C         Social History     Tobacco Use    Smoking status: Former     Packs/day: 1.00     Years: 20.00     Additional pack years: 0.00     Total pack years: 20.00     Types: Cigarettes     Start date: 1976     Quit date: 2005     Years since quittin.9    Smokeless tobacco: Never    Tobacco comments:     quit 8-9 years ago   Vaping Use    Vaping Use: Never used   Substance Use Topics    Alcohol use: Yes     Comment: seldom    Drug use: No         Review of Systems   Except as noted in HPI, all other systems were reviewed and are negative    Physical Exam   Vitals were reviewed  Patient Vitals for the past 12 hrs:   BP Pulse Resp SpO2   23 2215 (!) 142/83 70 (!) 7 --   23 2200 (!) 159/96 76 15 --   23 2145 (!) 157/87 73 14 --   23 2130 (!) 183/101 76 -- --   23 2115 (!) 189/100 82 -- 96 %   23 2100 (!) 177/100 80 -- 99 %   23 2047 (!) 177/94 83 -- 96 %   23 2041 (!) 193/101 88 20 98 %     GENERAL APPEARANCE: Alert and oriented x 3, no acute respiratory distress,  FACE: normal facies; no asymmetry  EYES: Pupils are equal  HENT: normal external exam  NECK: no adenopathy or asymmetry  RESP: normal respiratory effort; clear breath sounds bilaterally  CV: regular rate and rhythm; no significant murmurs, gallops or rubs  ABD: soft, no tenderness; no rebound or guarding; bowel sounds are normal  MS: no gross deformities noted; normal muscle tone.  EXT: No calf tenderness or pitting edema  SKIN: no worrisome rash  NEURO: no facial droop; no focal deficits, speech is normal        Available Lab/Imaging Results     Results for orders placed or performed during the hospital encounter of 23 (from the past 24 hour(s))   CBC with platelets differential    Narrative    The following orders were created for panel order CBC with platelets differential.  Procedure                                Abnormality         Status                     ---------                               -----------         ------                     CBC with platelets and d...[[id number]]                      Final result                 Please view results for these tests on the individual orders.   Comprehensive metabolic panel   Result Value Ref Range    Sodium 137 135 - 145 mmol/L    Potassium 3.8 3.4 - 5.3 mmol/L    Carbon Dioxide (CO2) 25 22 - 29 mmol/L    Anion Gap 13 7 - 15 mmol/L    Urea Nitrogen 15.2 8.0 - 23.0 mg/dL    Creatinine 0.69 0.51 - 0.95 mg/dL    GFR Estimate >90 >60 mL/min/1.73m2    Calcium 9.6 8.8 - 10.2 mg/dL    Chloride 99 98 - 107 mmol/L    Glucose 198 (H) 70 - 99 mg/dL    Alkaline Phosphatase 62 40 - 150 U/L    AST 16 0 - 45 U/L    ALT 20 0 - 50 U/L    Protein Total 7.1 6.4 - 8.3 g/dL    Albumin 4.3 3.5 - 5.2 g/dL    Bilirubin Total 0.5 <=1.2 mg/dL   Troponin T, High Sensitivity   Result Value Ref Range    Troponin T, High Sensitivity <6 <=14 ng/L   Nt probnp inpatient (BNP)   Result Value Ref Range    N terminal Pro BNP Inpatient 220 0 - 900 pg/mL   CBC with platelets and differential   Result Value Ref Range    WBC Count 10.1 4.0 - 11.0 10e3/uL    RBC Count 4.57 3.80 - 5.20 10e6/uL    Hemoglobin 13.5 11.7 - 15.7 g/dL    Hematocrit 41.1 35.0 - 47.0 %    MCV 90 78 - 100 fL    MCH 29.5 26.5 - 33.0 pg    MCHC 32.8 31.5 - 36.5 g/dL    RDW 13.8 10.0 - 15.0 %    Platelet Count 297 150 - 450 10e3/uL    % Neutrophils 59 %    % Lymphocytes 29 %    % Monocytes 7 %    % Eosinophils 4 %    % Basophils 1 %    % Immature Granulocytes 0 %    NRBCs per 100 WBC 0 <1 /100    Absolute Neutrophils 5.8 1.6 - 8.3 10e3/uL    Absolute Lymphocytes 2.9 0.8 - 5.3 10e3/uL    Absolute Monocytes 0.7 0.0 - 1.3 10e3/uL    Absolute Eosinophils 0.4 0.0 - 0.7 10e3/uL    Absolute Basophils 0.1 0.0 - 0.2 10e3/uL    Absolute Immature Granulocytes 0.0 <=0.4 10e3/uL    Absolute NRBCs 0.0 10e3/uL   UA with  Microscopic reflex to Culture    Specimen: Urine, Clean Catch   Result Value Ref Range    Color Urine Straw Colorless, Straw, Light Yellow, Yellow    Appearance Urine Clear Clear    Glucose Urine Negative Negative mg/dL    Bilirubin Urine Negative Negative    Ketones Urine Negative Negative mg/dL    Specific Gravity Urine 1.010 1.003 - 1.035    Blood Urine Negative Negative    pH Urine 7.0 5.0 - 7.0    Protein Albumin Urine 30 (A) Negative mg/dL    Urobilinogen Urine Normal Normal, 2.0 mg/dL    Nitrite Urine Negative Negative    Leukocyte Esterase Urine Negative Negative    Mucus Urine Present (A) None Seen /LPF    RBC Urine 1 <=2 /HPF    WBC Urine 0 <=5 /HPF    Squamous Epithelials Urine <1 <=1 /HPF    Narrative    Urine Culture not indicated         EKG reviewed by me: Normal sinus rhythm with heart rate of 80.  LA interval of 152 ms, QT interval of 376 ms, QRS duration of 76 ms.  No ST-T wave changes.  No signs of LVH.  Impression: Normal ECG.      Impression     Final diagnoses:   Hypertension, unspecified type         ED Course & Medical Decision Making   Amarilis Vazquez is a 63 year old female with history of type 2 diabetes, hypertension, hyperlipidemia, and hypothyroidism who presented to the emergency department with elevated blood pressure with symptoms of headache and light sensitivity.  She had noticed slight increase in her blood pressures over the last several days but her blood pressure spiked to over 200 systolic tonight.  Patient had some ham earlier in the day, and has been using a tincture every morning with a dropperful of yarrow with a small amount of alcohol under the tongue every morning.  There has been no other changes except for the stress that she is going through with her daughter's recent motorcycle accident leading to an amputation.  Last week, her daughter called and asked her to take her to the hospital and she did not run down right away, and later on out that her daughter had  infective endocarditis.  Her daughter refused to talk to her, and so she has been dealing with a lot of stress which she thought she was managing.    Initial vital signs revealed a blood pressure of 1 193/101, subsequent blood pressures started to go down and her most recent blood pressure is 157/87.  EKG reveals normal sinus rhythm.  Troponin is less than 6, BNP is 220, CBC is normal.  Comprehensive metabolic panel is also normal except for a nonfasting glucose of 198.  Urinalysis reveals 30 mg/dL of protein, no red or white blood cells.    Patient's blood pressure started to normalize, with subsequent blood pressures of 157/87, 142/83.  Patient did not take any extra blood pressure medications.  I suspect that her high blood pressure readings today are caused by several factors including increased salt intake from eating ham, and the increase stressors that she has been experiencing related to her daughters serious accident and relationship issues.  Continue close monitoring.  Patient's symptoms are likely not due to accelerated hypertension.  I recommended close monitoring of not just blood pressure but symptoms.  Reduce salt intake, and try to manage stress as best that she can.  Follow-up in the clinic in 5-7 days if blood pressure remains elevated.  Consider discontinuing the herbal tincture that she has been using over the past week if her blood pressure remains elevated.  Return to the emergency department at any time if she develops new or worsening symptoms.        Written after-visit summary and instructions were given at the time of discharge.    Follow up Plan:   Kehr, Kristen M, PA-C  [address] [facility] [address]  [phone]    In 5 days      [facility] Emergency Dept  [address] [place] [address] [phone]    If symptoms worsen      Discharge Instructions:   We did not find any specific cause for your poorly controlled hypertension.  This may be due  to several factors including increased salt intake, and life stressors.  Continue to monitor for further symptoms as well as record your blood pressures over the next several days.  Follow-up with your primary care provider in 5-7 days if your blood pressure remains elevated.  Return to the emergency department if you are developing any symptoms of chest pain, shortness of breath, or strokelike symptoms.  Consider discontinuing your tincture if your blood pressure remains high over the next few days.       Disclaimer: This note consists of words and symbols derived from keyboarding and dictation using voice recognition software.  As a result, there may be errors that have gone undetected.  Please consider this when interpreting information found in this note.       Stanton Carpio MD  11/24/23 0154

## 2023-11-24 NOTE — ED TRIAGE NOTES
Pt comes in with concerns about elevated BP. States that it has been elevated most of the day. Headache and cough also.      Triage Assessment (Adult)       Row Name 11/23/23 2042          Triage Assessment    Airway WDL WDL        Respiratory WDL    Respiratory WDL WDL        Skin Circulation/Temperature WDL    Skin Circulation/Temperature WDL WDL        Cardiac WDL    Cardiac WDL all        Peripheral/Neurovascular WDL    Peripheral Neurovascular WDL WDL        Cognitive/Neuro/Behavioral WDL    Cognitive/Neuro/Behavioral WDL WDL                      [General Appearance - Alert] : alert [General Appearance - In No Acute Distress] : in no acute distress [Sclera] : the sclera and conjunctiva were normal [PERRL With Normal Accommodation] : pupils were equal in size, round, and reactive to light [Extraocular Movements] : extraocular movements were intact [Outer Ear] : the ears and nose were normal in appearance [Oropharynx] : the oropharynx was normal [Jugular Venous Distention Increased] : there was no jugular-venous distention [Auscultation Breath Sounds / Voice Sounds] : lungs were clear to auscultation bilaterally [Heart Rate And Rhythm] : heart rate was normal and rhythm regular [Heart Sounds] : normal S1 and S2 [Murmurs] : no murmurs [Full Pulse] : the pedal pulses are present [Edema] : there was no peripheral edema [Bowel Sounds] : normal bowel sounds [Abdomen Soft] : soft [Abdomen Tenderness] : non-tender [Urinary Bladder Findings] : the bladder was normal on palpation [Abnormal Walk] : normal gait [Musculoskeletal - Swelling] : no joint swelling seen [Skin Color & Pigmentation] : normal skin color and pigmentation [] : no rash [Cranial Nerves] : cranial nerves 2-12 were intact [No Focal Deficits] : no focal deficits [Oriented To Time, Place, And Person] : oriented to person, place, and time [Impaired Insight] : insight and judgment were intact [Affect] : the affect was normal [FreeTextEntry1] : R lower back tenderness to palpation along musculature even with light tough - seems MSK in nature impaired balance/cognition/impaired postural control/decreased strength

## 2023-11-27 ENCOUNTER — OFFICE VISIT (OUTPATIENT)
Dept: FAMILY MEDICINE | Facility: CLINIC | Age: 63
End: 2023-11-27
Payer: COMMERCIAL

## 2023-11-27 VITALS
RESPIRATION RATE: 16 BRPM | DIASTOLIC BLOOD PRESSURE: 84 MMHG | BODY MASS INDEX: 27.66 KG/M2 | OXYGEN SATURATION: 98 % | HEART RATE: 83 BPM | TEMPERATURE: 97.9 F | SYSTOLIC BLOOD PRESSURE: 138 MMHG | HEIGHT: 65 IN | WEIGHT: 166 LBS

## 2023-11-27 DIAGNOSIS — I10 ESSENTIAL HYPERTENSION: Primary | ICD-10-CM

## 2023-11-27 DIAGNOSIS — E03.9 HYPOTHYROIDISM, UNSPECIFIED TYPE: ICD-10-CM

## 2023-11-27 DIAGNOSIS — R80.9 TYPE 2 DIABETES MELLITUS WITH MICROALBUMINURIA, WITHOUT LONG-TERM CURRENT USE OF INSULIN (H): ICD-10-CM

## 2023-11-27 DIAGNOSIS — E11.29 TYPE 2 DIABETES MELLITUS WITH MICROALBUMINURIA, WITHOUT LONG-TERM CURRENT USE OF INSULIN (H): ICD-10-CM

## 2023-11-27 PROCEDURE — 99214 OFFICE O/P EST MOD 30 MIN: CPT | Performed by: PHYSICIAN ASSISTANT

## 2023-11-27 RX ORDER — LEVOTHYROXINE SODIUM 88 UG/1
88 TABLET ORAL DAILY
Qty: 90 TABLET | Refills: 1 | Status: SHIPPED | OUTPATIENT
Start: 2023-11-27 | End: 2024-02-14

## 2023-11-27 RX ORDER — LIRAGLUTIDE 6 MG/ML
INJECTION SUBCUTANEOUS
Qty: 27 ML | Refills: 3 | Status: SHIPPED | OUTPATIENT
Start: 2023-11-27 | End: 2024-02-14

## 2023-11-27 RX ORDER — METFORMIN HCL 500 MG
TABLET, EXTENDED RELEASE 24 HR ORAL
Qty: 360 TABLET | Refills: 1 | Status: SHIPPED | OUTPATIENT
Start: 2023-11-27 | End: 2024-02-14

## 2023-11-27 RX ORDER — LISINOPRIL AND HYDROCHLOROTHIAZIDE 20; 25 MG/1; MG/1
TABLET ORAL
Qty: 90 TABLET | Refills: 1 | Status: SHIPPED | OUTPATIENT
Start: 2023-11-27 | End: 2023-11-27

## 2023-11-27 RX ORDER — LISINOPRIL AND HYDROCHLOROTHIAZIDE 20; 25 MG/1; MG/1
TABLET ORAL
Qty: 90 TABLET | Refills: 1 | Status: SHIPPED | OUTPATIENT
Start: 2023-11-27 | End: 2024-02-14

## 2023-11-27 RX ORDER — METOPROLOL SUCCINATE 50 MG/1
50 TABLET, EXTENDED RELEASE ORAL DAILY
Qty: 90 TABLET | Refills: 1 | Status: SHIPPED | OUTPATIENT
Start: 2023-11-27 | End: 2024-02-14

## 2023-11-27 ASSESSMENT — PAIN SCALES - GENERAL: PAINLEVEL: NO PAIN (0)

## 2023-11-27 NOTE — PROGRESS NOTES
"  Assessment & Plan     Essential hypertension  Improvement since ER visit.   Continue with the current medications.   Stress / anxiety, increase in salt intake most likely the cause of the elevation.   Discussed her supplements, they may have contributed. Hard to determine, supplements are unregulated and unsure of specific interactions.   She will continue to monitor at home also.   - metoprolol succinate ER (TOPROL XL) 50 MG 24 hr tablet; Take 1 tablet (50 mg) by mouth daily  - lisinopril-hydrochlorothiazide (ZESTORETIC) 20-25 MG tablet; TAKE 1 TABLET BY MOUTH EVERY DAY    Hypothyroidism, unspecified type  Stable, refills given  - levothyroxine (SYNTHROID/LEVOTHROID) 88 MCG tablet; Take 1 tablet (88 mcg) by mouth daily    Type 2 diabetes mellitus with microalbuminuria, without long-term current use of insulin (H)  Stable, refills given  - liraglutide (VICTOZA PEN) 18 MG/3ML solution; 1.8 mg under skin once daily  - metFORMIN (GLUCOPHAGE XR) 500 MG 24 hr tablet; TAKE 2 TABLETS BY MOUTH TWICE A DAY  - lisinopril-hydrochlorothiazide (ZESTORETIC) 20-25 MG tablet; TAKE 1 TABLET BY MOUTH EVERY DAY           MED REC REQUIRED  Post Medication Reconciliation Status:     BMI:   Estimated body mass index is 27.62 kg/m  as calculated from the following:    Height as of this encounter: 1.651 m (5' 5\").    Weight as of this encounter: 75.3 kg (166 lb).   Weight management plan: Discussed healthy diet and exercise guidelines        Kristen M. Kehr, PA-C  [facility]   Jyoti is a 63 year old, presenting for the following health issues:  ER F/U        11/27/2023     1:48 PM   Additional Questions   Roomed by Madonna   Accompanied by Self       HPI       ED/UC Followup:    Facility:  [facility] [place]  Date of visit: 11/23/23  Reason for visit: HTN  Current Status: 'states back to normal'         Jyoti is here today for recheck of blood pressure.   She was in the ER recently with significantly elevated " "readings. She has been eating more salt and also under more stress. She has been monitoring at home and BP is down within normal range.    She is also requesting a refill of her medications before the end of the year. She has an insurance benefit that will be running out      Review of Systems   Constitutional, HEENT, cardiovascular, pulmonary, GI, , musculoskeletal, neuro, skin, endocrine and psych systems are negative, except as otherwise noted.      Objective    /84   Pulse 83   Temp 97.9  F (36.6  C) (Oral)   Resp 16   Ht 1.651 m (5' 5\")   Wt 75.3 kg (166 lb)   LMP 02/13/2013   SpO2 98%   BMI 27.62 kg/m    Body mass index is 27.62 kg/m .  Physical Exam   GENERAL: healthy, alert and no distress  RESP: lungs clear to auscultation - no rales, rhonchi or wheezes  CV: regular rate and rhythm, normal S1 S2, no S3 or S4, no murmur, click or rub, no peripheral edema and peripheral pulses strong  MS: no gross musculoskeletal defects noted, no edema  SKIN: no suspicious lesions or rashes  PSYCH: mentation appears normal, affect normal/bright    Admission on 11/23/2023, Discharged on 11/23/2023   Component Date Value Ref Range Status    Sodium 11/23/2023 137  135 - 145 mmol/L Final    Reference intervals for this test were updated on 09/26/2023 to more accurately reflect our healthy population. There may be differences in the flagging of prior results with similar values performed with this method. Interpretation of those prior results can be made in the context of the updated reference intervals.     Potassium 11/23/2023 3.8  3.4 - 5.3 mmol/L Final    Carbon Dioxide (CO2) 11/23/2023 25  22 - 29 mmol/L Final    Anion Gap 11/23/2023 13  7 - 15 mmol/L Final    Urea Nitrogen 11/23/2023 15.2  8.0 - 23.0 mg/dL Final    Creatinine 11/23/2023 0.69  0.51 - 0.95 mg/dL Final    GFR Estimate 11/23/2023 >90  >60 mL/min/1.73m2 Final    Calcium 11/23/2023 9.6  8.8 - 10.2 mg/dL Final    Chloride 11/23/2023 99  98 - 107 " mmol/L Final    Glucose 11/23/2023 198 (H)  70 - 99 mg/dL Final    Alkaline Phosphatase 11/23/2023 62  40 - 150 U/L Final    Reference intervals for this test were updated on 11/14/2023 to more accurately reflect our healthy population. There may be differences in the flagging of prior results with similar values performed with this method. Interpretation of those prior results can be made in the context of the updated reference intervals.    AST 11/23/2023 16  0 - 45 U/L Final    Reference intervals for this test were updated on 6/12/2023 to more accurately reflect our healthy population. There may be differences in the flagging of prior results with similar values performed with this method. Interpretation of those prior results can be made in the context of the updated reference intervals.    ALT 11/23/2023 20  0 - 50 U/L Final    Reference intervals for this test were updated on 6/12/2023 to more accurately reflect our healthy population. There may be differences in the flagging of prior results with similar values performed with this method. Interpretation of those prior results can be made in the context of the updated reference intervals.      Protein Total 11/23/2023 7.1  6.4 - 8.3 g/dL Final    Albumin 11/23/2023 4.3  3.5 - 5.2 g/dL Final    Bilirubin Total 11/23/2023 0.5  <=1.2 mg/dL Final    Color Urine 11/23/2023 Straw  Colorless, Straw, Light Yellow, Yellow Final    Appearance Urine 11/23/2023 Clear  Clear Final    Glucose Urine 11/23/2023 Negative  Negative mg/dL Final    Bilirubin Urine 11/23/2023 Negative  Negative Final    Ketones Urine 11/23/2023 Negative  Negative mg/dL Final    Specific Gravity Urine 11/23/2023 1.010  1.003 - 1.035 Final    Blood Urine 11/23/2023 Negative  Negative Final    pH Urine 11/23/2023 7.0  5.0 - 7.0 Final    Protein Albumin Urine 11/23/2023 30 (A)  Negative mg/dL Final    Urobilinogen Urine 11/23/2023 Normal  Normal, 2.0 mg/dL Final    Nitrite Urine 11/23/2023 Negative   Negative Final    Leukocyte Esterase Urine 11/23/2023 Negative  Negative Final    Mucus Urine 11/23/2023 Present (A)  None Seen /LPF Final    RBC Urine 11/23/2023 1  <=2 /HPF Final    WBC Urine 11/23/2023 0  <=5 /HPF Final    Squamous Epithelials Urine 11/23/2023 <1  <=1 /HPF Final    Troponin T, High Sensitivity 11/23/2023 <6  <=14 ng/L Final    Either a High Sensitivity Troponin T baseline (0 hours) value = 100 ng/L, or an increase in High Sensitivity Troponin T = 7 ng/L at 2 hours compared to 0 hours (2-0 hours), suggests myocardial injury, and urgent clinical attention is required.    If the 2-0 hours increase is <7 ng/L, a High Sensitivity Troponin T result above gender-specific reference ranges warrants further evaluation.   Recommendations for further evaluation include correlation with clinical decision-making tool (e.g., HEART), a 3rd High Sensitivity Troponin T test 2 hours after the 2nd (a 20% change from baseline would represent concern), admission for observation, close PCC/cardiology follow-up, or urgent outpatient provocative testing.    N terminal Pro BNP Inpatient 11/23/2023 220  0 - 900 pg/mL Final    Reference range shown and results flagged as abnormal are suggested inpatient cut points for confirming diagnosis if CHF in an acute setting. Establishing a baseline value for each individual patient is useful for follow-up. An inpatient or emergency department NT-proPBNP <300 pg/mL effectively rules out acute CHF, with 99% negative predictive value.    The outpatient non-acute reference range for ruling out CHF is:  0-125 pg/mL (age 18 to less than 75)  0-450 pg/mL (age 75 yrs and older)     WBC Count 11/23/2023 10.1  4.0 - 11.0 10e3/uL Final    RBC Count 11/23/2023 4.57  3.80 - 5.20 10e6/uL Final    Hemoglobin 11/23/2023 13.5  11.7 - 15.7 g/dL Final    Hematocrit 11/23/2023 41.1  35.0 - 47.0 % Final    MCV 11/23/2023 90  78 - 100 fL Final    MCH 11/23/2023 29.5  26.5 - 33.0 pg Final    MCHC  11/23/2023 32.8  31.5 - 36.5 g/dL Final    RDW 11/23/2023 13.8  10.0 - 15.0 % Final    Platelet Count 11/23/2023 297  150 - 450 10e3/uL Final    % Neutrophils 11/23/2023 59  % Final    % Lymphocytes 11/23/2023 29  % Final    % Monocytes 11/23/2023 7  % Final    % Eosinophils 11/23/2023 4  % Final    % Basophils 11/23/2023 1  % Final    % Immature Granulocytes 11/23/2023 0  % Final    NRBCs per 100 WBC 11/23/2023 0  <1 /100 Final    Absolute Neutrophils 11/23/2023 5.8  1.6 - 8.3 10e3/uL Final    Absolute Lymphocytes 11/23/2023 2.9  0.8 - 5.3 10e3/uL Final    Absolute Monocytes 11/23/2023 0.7  0.0 - 1.3 10e3/uL Final    Absolute Eosinophils 11/23/2023 0.4  0.0 - 0.7 10e3/uL Final    Absolute Basophils 11/23/2023 0.1  0.0 - 0.2 10e3/uL Final    Absolute Immature Granulocytes 11/23/2023 0.0  <=0.4 10e3/uL Final    Absolute NRBCs 11/23/2023 0.0  10e3/uL Final

## 2024-02-14 ENCOUNTER — OFFICE VISIT (OUTPATIENT)
Dept: FAMILY MEDICINE | Facility: CLINIC | Age: 64
End: 2024-02-14
Payer: COMMERCIAL

## 2024-02-14 VITALS
HEART RATE: 92 BPM | BODY MASS INDEX: 27.99 KG/M2 | OXYGEN SATURATION: 97 % | WEIGHT: 168 LBS | RESPIRATION RATE: 16 BRPM | SYSTOLIC BLOOD PRESSURE: 134 MMHG | DIASTOLIC BLOOD PRESSURE: 78 MMHG | TEMPERATURE: 98.3 F | HEIGHT: 65 IN

## 2024-02-14 DIAGNOSIS — Z00.00 ROUTINE GENERAL MEDICAL EXAMINATION AT A HEALTH CARE FACILITY: Primary | ICD-10-CM

## 2024-02-14 DIAGNOSIS — E03.9 HYPOTHYROIDISM, UNSPECIFIED TYPE: ICD-10-CM

## 2024-02-14 DIAGNOSIS — R80.9 TYPE 2 DIABETES MELLITUS WITH MICROALBUMINURIA, WITHOUT LONG-TERM CURRENT USE OF INSULIN (H): ICD-10-CM

## 2024-02-14 DIAGNOSIS — I10 ESSENTIAL HYPERTENSION: ICD-10-CM

## 2024-02-14 DIAGNOSIS — Z12.31 VISIT FOR SCREENING MAMMOGRAM: ICD-10-CM

## 2024-02-14 DIAGNOSIS — E78.2 MIXED HYPERLIPIDEMIA: ICD-10-CM

## 2024-02-14 DIAGNOSIS — Z12.11 SCREEN FOR COLON CANCER: ICD-10-CM

## 2024-02-14 DIAGNOSIS — E11.29 TYPE 2 DIABETES MELLITUS WITH MICROALBUMINURIA, WITHOUT LONG-TERM CURRENT USE OF INSULIN (H): ICD-10-CM

## 2024-02-14 DIAGNOSIS — Z12.4 CERVICAL CANCER SCREENING: ICD-10-CM

## 2024-02-14 PROCEDURE — G0145 SCR C/V CYTO,THINLAYER,RESCR: HCPCS | Performed by: PHYSICIAN ASSISTANT

## 2024-02-14 PROCEDURE — 99214 OFFICE O/P EST MOD 30 MIN: CPT | Mod: 25 | Performed by: PHYSICIAN ASSISTANT

## 2024-02-14 PROCEDURE — 99396 PREV VISIT EST AGE 40-64: CPT | Performed by: PHYSICIAN ASSISTANT

## 2024-02-14 PROCEDURE — 87624 HPV HI-RISK TYP POOLED RSLT: CPT | Performed by: PHYSICIAN ASSISTANT

## 2024-02-14 RX ORDER — LEVOTHYROXINE SODIUM 88 UG/1
88 TABLET ORAL DAILY
Qty: 90 TABLET | Refills: 1 | Status: SHIPPED | OUTPATIENT
Start: 2024-02-14 | End: 2024-08-08

## 2024-02-14 RX ORDER — LIRAGLUTIDE 6 MG/ML
INJECTION SUBCUTANEOUS
Qty: 27 ML | Refills: 3 | Status: SHIPPED | OUTPATIENT
Start: 2024-02-14 | End: 2024-09-18

## 2024-02-14 RX ORDER — METFORMIN HCL 500 MG
TABLET, EXTENDED RELEASE 24 HR ORAL
Qty: 360 TABLET | Refills: 1 | Status: SHIPPED | OUTPATIENT
Start: 2024-02-14 | End: 2024-08-08

## 2024-02-14 RX ORDER — LISINOPRIL AND HYDROCHLOROTHIAZIDE 20; 25 MG/1; MG/1
TABLET ORAL
Qty: 90 TABLET | Refills: 1 | Status: SHIPPED | OUTPATIENT
Start: 2024-02-14 | End: 2024-08-08

## 2024-02-14 RX ORDER — METOPROLOL SUCCINATE 50 MG/1
50 TABLET, EXTENDED RELEASE ORAL DAILY
Qty: 90 TABLET | Refills: 1 | Status: SHIPPED | OUTPATIENT
Start: 2024-02-14 | End: 2024-08-08

## 2024-02-14 NOTE — PROGRESS NOTES
Preventive Care Visit  [facility] [place] Kristen M. Kehr, PA-C, Family Medicine  Feb 14, 2024    Assessment & Plan     Routine general medical examination at a health care facility  Health maintenance reviewed and updated.  Defers immunizations    Type 2 diabetes mellitus with microalbuminuria, without long-term current use of insulin (H)  Stable, A1C is at goal   Continue with medications and working on lifestyle changes.   - liraglutide (VICTOZA PEN) 18 MG/3ML solution; 1.8 mg under skin once daily  - lisinopril-hydrochlorothiazide (ZESTORETIC) 20-25 MG tablet; TAKE 1 TABLET BY MOUTH EVERY DAY  - metFORMIN (GLUCOPHAGE XR) 500 MG 24 hr tablet; TAKE 2 TABLETS BY MOUTH TWICE A DAY    Hypothyroidism, unspecified type  Stable with the levothyroxine 88 mcg  - levothyroxine (SYNTHROID/LEVOTHROID) 88 MCG tablet; Take 1 tablet (88 mcg) by mouth daily    Essential hypertension  stable  - lisinopril-hydrochlorothiazide (ZESTORETIC) 20-25 MG tablet; TAKE 1 TABLET BY MOUTH EVERY DAY  - metoprolol succinate ER (TOPROL XL) 50 MG 24 hr tablet; Take 1 tablet (50 mg) by mouth daily    Dyslipidemia  Declines statin.   She is aware of risk.   The 10-year ASCVD risk score (Elvi DK, et al., 2019) is: 19.2%    Values used to calculate the score:      Age: 64 years      Sex: Female      Is Non- : No      Diabetic: Yes      Tobacco smoker: No      Systolic Blood Pressure: 134 mmHg      Is BP treated: Yes      HDL Cholesterol: 39 mg/dL      Total Cholesterol: 317 mg/dL    Cervical cancer screening  - Pap Screen with HPV - recommended age 30 - 65 years  - HPV Hold (Lab Only)    Visit for screening mammogram  - MA SCREENING DIGITAL BILAT - Future  (s+30); Future    Screen for colon cancer  - Fecal colorectal cancer screen FIT - Future (S+30); Future  - Fecal colorectal cancer screen FIT - Future (S+30)                    Toshia Montes is a 64 year old, presenting for the following:  Physical,  Diabetes, and Hypertension        2024    12:37 PM   Additional Questions   Roomed by Duane JACQUES MA        Health Care Directive  Patient does not have a Health Care Directive or Living Will: Discussed advance care planning with patient; information given to patient to review.    HPI  Jyoti is here today for routine preventative care and recheck of chronic conditions.     PROBLEMS TO ADD:  Diabetes: managed with metformin and victoza  Hypertension: managed with zestoretic and metoprolol  Hyperlipidemia: managed with diet and exercise. Declines statin.                No data to display                  10/4/2022   Nutrition   Three or more servings of calcium each day? Yes   Diet: regular (no restrictions)         10/4/2022   Exercise   Frequency of exercise: 4-5 days/week             No data to display                   10/4/2022   Dental   Dentist two times every year? Yes          No data to display                    Today's PHQ-2 Score:       2024    12:47 PM   PHQ-2 (  [employer])   Q1: Little interest or pleasure in doing things 0   Q2: Feeling down, depressed or hopeless 0   PHQ-2 Score 0         10/4/2022   Substance Use   Alcohol more than 3/day or more than 7/wk No     Social History     Tobacco Use    Smoking status: Former     Packs/day: 1.00     Years: 20.00     Additional pack years: 0.00     Total pack years: 20.00     Types: Cigarettes     Start date: 1976     Quit date: 2005     Years since quittin.1    Smokeless tobacco: Never    Tobacco comments:     quit 8-9 years ago   Vaping Use    Vaping Use: Never used   Substance Use Topics    Alcohol use: Yes     Comment: seldom    Drug use: No          Mammogram Screening - Mammogram every 1-2 years updated in Health Maintenance based on mutual decision making      History of abnormal Pap smear: NO - age 30-65 PAP every 5 years with negative HPV co-testing recommended  Last 3 Pap and HPV Results:       Latest Ref Rng & Units 2018     10:51 AM 4/23/2018    10:40 AM 3/6/2015    12:00 AM   PAP / HPV   PAP (Historical)  NIL   NIL    HPV 16 DNA NEG^Negative  Negative     HPV 18 DNA NEG^Negative  Negative     Other HR HPV NEG^Negative  Negative             Latest Ref Rng & Units 4/23/2018    10:51 AM 4/23/2018    10:40 AM 3/6/2015    12:00 AM   PAP / HPV   PAP (Historical)  NIL   NIL    HPV 16 DNA NEG^Negative  Negative     HPV 18 DNA NEG^Negative  Negative     Other HR HPV NEG^Negative  Negative       The 10-year ASCVD risk score (Elvi CARROLL, et al., 2019) is: 19.2%    Values used to calculate the score:      Age: 64 years      Sex: Female      Is Non- : No      Diabetic: Yes      Tobacco smoker: No      Systolic Blood Pressure: 134 mmHg      Is BP treated: Yes      HDL Cholesterol: 39 mg/dL      Total Cholesterol: 317 mg/dL           Reviewed and updated as needed this visit by Provider                    Past Medical History:   Diagnosis Date    Coronary artery disease     Diabetic eye exam (H) [facility]/12/11    [facility]    Diabetic eye exam (H) 01/28/12, 1/6/14    [facility]    Other and unspecified hyperlipidemia     Type II or unspecified type diabetes mellitus without mention of complication, not stated as uncontrolled     Diabetes mellitus    Unspecified essential hypertension     Unspecified hypothyroidism     Hypothyroidism     Past Surgical History:   Procedure Laterality Date    D & C  1979     OB History   No obstetric history on file.     BP Readings from Last 3 Encounters:   02/14/24 134/78   11/27/23 138/84   11/23/23 (!) 142/83    Wt Readings from Last 3 Encounters:   02/14/24 76.2 kg (168 lb)   11/27/23 75.3 kg (166 lb)   03/15/23 75.8 kg (167 lb)                  Patient Active Problem List   Diagnosis    Benign essential hypertension    Hypothyroidism    CAD (coronary artery disease)    Coronary artery disease involving native coronary artery of native heart without angina pectoris    Mixed  hyperlipidemia    Advanced directives, counseling/discussion    Type 2 diabetes mellitus with microalbuminuria, without long-term current use of insulin (H)    Insomnia, unspecified type    Chronic kidney disease, stage 1     Past Surgical History:   Procedure Laterality Date    D & C         Social History     Tobacco Use    Smoking status: Former     Packs/day: 1.00     Years: 20.00     Additional pack years: 0.00     Total pack years: 20.00     Types: Cigarettes     Start date: 1976     Quit date: 2005     Years since quittin.1    Smokeless tobacco: Never    Tobacco comments:     quit 8-9 years ago   Substance Use Topics    Alcohol use: Yes     Comment: seldom     Family History   Problem Relation Age of Onset    Cerebrovascular Disease Mother     Cancer Mother         lung    C.A.D. Mother         MI age 50?    C.A.D. Maternal Grandmother     Diabetes Maternal Grandmother     Thyroid Disease Maternal Grandmother     C.A.D. Maternal Grandfather     C.A.D. Paternal Grandfather 50        MI    Thyroid Disease Son     Other - See Comments Daughter         was in MVA this summer, lost her leg    Breast Cancer No family hx of     Gynecology No family hx of         No ovarian, endometrial cancer    Osteoporosis No family hx of     Ovarian Cancer No family hx of     Cancer - colorectal No family hx of          Current Outpatient Medications   Medication Sig Dispense Refill    Alpha Lipoic Acid 200 MG CAPS Take 600 mg by mouth daily      aspirin (ASA) 81 MG EC tablet Take 1 tablet (81 mg) by mouth daily      B Complex-C-Folic Acid (SUPER B COMPLEX/FA/VIT C) TABS Take 1 tablet by mouth daily      blood glucose (NO BRAND SPECIFIED) test strip Use to test blood sugar 4 times daily or as directed. [employer] brand strips and meter from insurance      blood glucose monitoring (NO BRAND SPECIFIED) meter device kit Use to test blood sugar 4 times daily or as directed. [employer] brand provided from pt's insurance  company      Boswellia-Glucosamine-Vit D (GLUCOSAMINE COMPLEX -BOSWELLIA) TABS Take 1 tablet by mouth daily 1 tablet = Boswellia nickolas 25 mg, glucosamine 375 mg, chondroitin 150 mg, turmeric rhizome 25 mg, hyaluronic acid 25 mg      co-enzyme Q-10 100 MG/5ML LIQD liquid Take 7.5 mLs (150 mg) by mouth daily      cyanocobalamin 1000 MCG SUBL sublingual tablet Place 1,000 mcg under the tongue daily      Glucosamine HCl-MSM (SM GLUCOSAMINE HCL-MSM) 750-750 MG TABS Take 1,500 mg by mouth daily      insulin pen needle 32G X 4 MM Use 1 pen needles daily or as directed. 100 each 5    levothyroxine (SYNTHROID/LEVOTHROID) 88 MCG tablet Take 1 tablet (88 mcg) by mouth daily 90 tablet 1    liraglutide (VICTOZA PEN) 18 MG/3ML solution 1.8 mg under skin once daily 27 mL 3    lisinopril-hydrochlorothiazide (ZESTORETIC) 20-25 MG tablet TAKE 1 TABLET BY MOUTH EVERY DAY 90 tablet 1    magnesium 100 MG TABS Take 1 tablet by mouth daily      metFORMIN (GLUCOPHAGE XR) 500 MG 24 hr tablet TAKE 2 TABLETS BY MOUTH TWICE A  tablet 1    metoprolol succinate ER (TOPROL XL) 50 MG 24 hr tablet Take 1 tablet (50 mg) by mouth daily 90 tablet 1    Misc Natural Products (SAMBUCUS ELDERBERRY IMMUNE) SYRP Take 560 mg by mouth daily During winter months      NEW MED Homemade tincture of [place] steeped in Vodka      Omega-3 Fatty Acids (FISH OIL ULTRA) 1400 MG CAPS Take 1 capsule by mouth daily Wild [place] fish oil      thin (NO BRAND SPECIFIED) lancets Use with lanceting device. Use to test blood sugar 4 times daily or as directed. [employer] brand provided from Blockchain's insurance company      Turmeric Curcumin 500 MG CAPS Take 1,000 mg by mouth daily (15 mL liquid dose daily)       No Known Allergies  Recent Labs   Lab Test 11/23/23  2118 11/02/23  0804 03/15/23  1159 09/27/22  0811 03/18/22  1239 11/18/21  0843 05/27/21  0832 11/19/20  0811   A1C  --  6.9* 6.6* 6.3*   < > 6.4* 7.4* 7.3*   LDL  --  205*  --  167*  --  170*  --  Cannot estimate LDL  "when triglyceride exceeds 400 mg/dL  159*   HDL  --  39*  --  41*  --  42*  --  40*   TRIG  --  421*  --  330*  --  229*  --  430*   ALT 20  --   --   --   --   --  32 36   CR 0.69 0.71  --  0.60   < > 0.62 0.66 0.63   GFRESTIMATED >90 >90  --  >90   < > >90 >90 >90   GFRESTBLACK  --   --   --   --   --   --  >90 >90   POTASSIUM 3.8 4.4  --  4.3   < > 4.0 3.9 4.2   TSH  --  2.14  --  2.01   < >  --  0.29* 1.02    < > = values in this interval not displayed.          Review of Systems  Constitutional, HEENT, cardiovascular, pulmonary, GI, , musculoskeletal, neuro, skin, endocrine and psych systems are negative, except as otherwise noted.     Objective    Exam  /78   Pulse 92   Temp 98.3  F (36.8  C) (Tympanic)   Resp 16   Ht 1.657 m (5' 5.25\")   Wt 76.2 kg (168 lb)   LMP 02/13/2013 (Approximate)   SpO2 97%   Breastfeeding No   BMI 27.74 kg/m     Estimated body mass index is 27.74 kg/m  as calculated from the following:    Height as of this encounter: 1.657 m (5' 5.25\").    Weight as of this encounter: 76.2 kg (168 lb).    Physical Exam  GENERAL: alert and no distress  EYES: Eyes grossly normal to inspection, PERRL and conjunctivae and sclerae normal  HENT: ear canals and TM's normal, nose and mouth without ulcers or lesions  NECK: no adenopathy, no asymmetry, masses, or scars  RESP: lungs clear to auscultation - no rales, rhonchi or wheezes  BREAST: normal without masses, tenderness or nipple discharge and no palpable axillary masses or adenopathy  CV: regular rate and rhythm, normal S1 S2, no S3 or S4, no murmur, click or rub, no peripheral edema  ABDOMEN: soft, nontender, no hepatosplenomegaly, no masses and bowel sounds normal   (female) w/bimanual: normal female external genitalia, normal urethral meatus, normal vaginal mucosa, and normal cervix/adnexa/uterus without masses or discharge  MS: no gross musculoskeletal defects noted, no edema  SKIN: no suspicious lesions or rashes  NEURO: Normal " strength and tone, mentation intact and speech normal  PSYCH: mentation appears normal, affect normal/bright      Signed Electronically by: Kristen M. Kehr, PA-C

## 2024-02-14 NOTE — PATIENT INSTRUCTIONS
Preventive Care Advice   This is general advice given by our system to help you stay healthy. However, your care team may have specific advice just for you. Please talk to your care team about your preventive care needs.  Nutrition  Eat 5 or more servings of fruits and vegetables each day.  Try wheat bread, brown rice and whole grain pasta (instead of white bread, rice, and pasta).  Get enough calcium and vitamin D. Check the label on foods and aim for 100% of the RDA (recommended daily allowance).  Lifestyle  Exercise at least 150 minutes each week  (30 minutes a day, 5 days a week).  Do muscle strengthening activities 2 days a week. These help control your weight and prevent disease.  No smoking.  Wear sunscreen to prevent skin cancer.  Have a dental exam and cleaning every 6 months.  Yearly exams  See your health care team every year to talk about:  Any changes in your health.  Any medicines your care team has prescribed.  Preventive care, family planning, and ways to prevent chronic diseases.  Shots (vaccines)   HPV shots (up to age 26), if you've never had them before.  Hepatitis B shots (up to age 59), if you've never had them before.  COVID-19 shot: Get this shot when it's due.  Flu shot: Get a flu shot every year.  Tetanus shot: Get a tetanus shot every 10 years.  Pneumococcal, hepatitis A, and RSV shots: Ask your care team if you need these based on your risk.  Shingles shot (for age 50 and up)  General health tests  Diabetes screening:  Starting at age 35, Get screened for diabetes at least every 3 years.  If you are younger than age 35, ask your care team if you should be screened for diabetes.  Cholesterol test: At age 39, start having a cholesterol test every 5 years, or more often if advised.  Bone density scan (DEXA): At age 50, ask your care team if you should have this scan for osteoporosis (brittle bones).  Hepatitis C: Get tested at least once in your life.  STIs (sexually transmitted  infections)  Before age 24: Ask your care team if you should be screened for STIs.  After age 24: Get screened for STIs if you're at risk. You are at risk for STIs (including HIV) if:  You are sexually active with more than one person.  You don't use condoms every time.  You or a partner was diagnosed with a sexually transmitted infection.  If you are at risk for HIV, ask about PrEP medicine to prevent HIV.  Get tested for HIV at least once in your life, whether you are at risk for HIV or not.  Cancer screening tests  Cervical cancer screening: If you have a cervix, begin getting regular cervical cancer screening tests starting at age 21.  Breast cancer scan (mammogram): If you've ever had breasts, begin having regular mammograms starting at age 40. This is a scan to check for breast cancer.  Colon cancer screening: It is important to start screening for colon cancer at age 45.  Have a colonoscopy test every 10 years (or more often if you're at risk) Or, ask your provider about stool tests like a FIT test every year or Cologuard test every 3 years.  To learn more about your testing options, visit:   https://[email or web address].  For help making a decision, visit:   https://bit.ly/ao73807.  Prostate cancer screening test: If you have a prostate, ask your care team if a prostate cancer screening test (PSA) at age 55 is right for you.  Lung cancer screening: If you are a current or former smoker ages 50 to 80, ask your care team if ongoing lung cancer screenings are right for you.  For informational purposes only. Not to replace the advice of your health care provider. Copyright   2023 [employer]. All rights reserved. Clinically reviewed by the [facility] Transitions Program. [employer] [id number] - REV 01/24.

## 2024-02-16 LAB
BKR LAB AP GYN ADEQUACY: NORMAL
BKR LAB AP GYN INTERPRETATION: NORMAL
BKR LAB AP HPV REFLEX: NORMAL
BKR LAB AP PREVIOUS ABNORMAL: NORMAL
PATH REPORT.COMMENTS IMP SPEC: NORMAL
PATH REPORT.COMMENTS IMP SPEC: NORMAL
PATH REPORT.RELEVANT HX SPEC: NORMAL

## 2024-02-19 PROCEDURE — 82274 ASSAY TEST FOR BLOOD FECAL: CPT | Performed by: PHYSICIAN ASSISTANT

## 2024-02-21 LAB — HEMOCCULT STL QL IA: NEGATIVE

## 2024-02-23 LAB
HUMAN PAPILLOMA VIRUS 16 DNA: NEGATIVE
HUMAN PAPILLOMA VIRUS 18 DNA: NEGATIVE
HUMAN PAPILLOMA VIRUS FINAL DIAGNOSIS: NORMAL
HUMAN PAPILLOMA VIRUS OTHER HR: NEGATIVE

## 2024-04-10 ENCOUNTER — ALLIED HEALTH/NURSE VISIT (OUTPATIENT)
Dept: AUDIOLOGY | Facility: OTHER | Age: 64
End: 2024-04-10
Payer: COMMERCIAL

## 2024-04-10 DIAGNOSIS — H90.6 MIXED HEARING LOSS, BILATERAL: Primary | ICD-10-CM

## 2024-04-10 PROCEDURE — V5299 HEARING SERVICE: HCPCS | Performed by: AUDIOLOGIST

## 2024-04-10 NOTE — PROGRESS NOTES
HEARING AID DROP-OFF    Patient Name:  Amarilis Vazquez    Patient Age:   64 year old    :  1960    Background:   The patient dropped off both hearing aids and her . She reports that the right is not working.     SIDE: Both   MAKE: Phonak   MODEL: Audeo L30-R   S/N: [id number], [id number]    WARRANTY: 10/21/2026    Procedures:   A listening check revealed no sound from the right hearing aid and good sound from the left hearing aid. Both hearing aids were cleaned and checked, the wax guards and domes were changed, and the microphones were suctioned. A listening check revealed no sound from the right hearing aid and good sound from the left hearing aid after cleaning.    The right 1M  was replaced with one from stock, but the correct size and strength was only available in the newer SDS 5 style. A pack of CeruStop wax guards was included. A listening check revealed good sound with the new .    Plan:   The patient was called and informed that her hearing aids are ready to . Discussed that the  style and wax guards are now different for the right hearing aid. The patient expressed concern about the change, but was encouraged to try the new  first before deciding if we should order an SDS 4.0 . She was encouraged to schedule a hearing aid check if she would like to go over how to change the new style of wax guards.      Ru Hernandez, CCC-A  Licensed Audiologist  4/10/2024

## 2024-04-26 ENCOUNTER — NURSE TRIAGE (OUTPATIENT)
Dept: NURSING | Facility: CLINIC | Age: 64
End: 2024-04-26
Payer: COMMERCIAL

## 2024-04-26 NOTE — TELEPHONE ENCOUNTER
Cough with wheezing for three days.  I connected with scheduling for an appointment and advised urgent care if they can't get her in.  Sherine Rodgers RN  [facility]    .  Reason for Disposition   Wheezing is present    Additional Information   Negative: SEVERE difficulty breathing (e.g., struggling for each breath, speaks in single words)   Negative: Bluish (or gray) lips or face now   Negative: [1] Rapid onset of cough AND [2] has hives   Negative: Coughing started suddenly after medicine, an allergic food or bee sting   Negative: [1] Difficulty breathing AND [2] exposure to flames, smoke, or fumes   Negative: [1] Stridor AND [2] difficulty breathing   Negative: Sounds like a life-threatening emergency to the triager   Negative: Choked on object of food that could be caught in the throat   Negative: Chest pain is main symptom   Negative: [1] Previous asthma attacks AND [2] this feels like asthma attack   Negative: Cough lasts > 3 weeks   Negative: Wet cough (productive; white-yellow, yellow, green, or leigha colored sputum)   Negative: [1] Dry cough (non-productive;  no sputum or minimal clear sputum) AND [2] within 14 days of COVID-19 Exposure   Negative: [1] MODERATE difficulty breathing (e.g., speaks in phrases, SOB even at rest, pulse 100-120) AND [2] still present when not coughing   Negative: Chest pain  (Exception: MILD central chest pain, present only when coughing.)   Negative: Patient sounds very sick or weak to the triager   Negative: [1] MILD difficulty breathing (e.g., minimal/no SOB at rest, SOB with walking, pulse <100) AND [2] still present when not coughing   Negative: [1] Coughed up blood AND [2] > 1 tablespoon (15 ml)   (Exception: Blood-tinged sputum.)   Negative: Fever > 103 F (39.4 C)   Negative: [1] Fever > 101 F (38.3 C) AND [2] age > 60 years   Negative: [1] Fever > 100.0 F (37.8 C) AND [2] bedridden (e.g., CVA, chronic illness, recovering from surgery)   Negative: [1] Fever >  100.0 F (37.8 C) AND [2] diabetes mellitus or weak immune system (e.g., HIV positive, cancer chemo, splenectomy, organ transplant, chronic steroids)    Protocols used: Cough - Acute Non-Productive-A-AH

## 2024-07-06 ENCOUNTER — HEALTH MAINTENANCE LETTER (OUTPATIENT)
Age: 64
End: 2024-07-06

## 2024-07-31 ENCOUNTER — ALLIED HEALTH/NURSE VISIT (OUTPATIENT)
Dept: AUDIOLOGY | Facility: OTHER | Age: 64
End: 2024-07-31
Payer: COMMERCIAL

## 2024-07-31 ENCOUNTER — TELEPHONE (OUTPATIENT)
Dept: AUDIOLOGY | Facility: OTHER | Age: 64
End: 2024-07-31

## 2024-07-31 DIAGNOSIS — H90.6 MIXED HEARING LOSS, BILATERAL: Primary | ICD-10-CM

## 2024-07-31 PROCEDURE — V5299 HEARING SERVICE: HCPCS | Performed by: AUDIOLOGIST

## 2024-07-31 NOTE — TELEPHONE ENCOUNTER
Called patient and let her know that her hearing aids are ready to , and that I replaced the left  with the original style.    Ru Hernandez, CCC-A  [place] Licensed Audiologist #[id number]  7/31/2024

## 2024-07-31 NOTE — PROGRESS NOTES
HEARING AID DROP-OFF    Patient Name:  Amarilis Vazquez    Patient Age:   64 year old    :  1960    Background:   The patient dropped off both hearing aids and her . No information was provided about the problem.     SIDE: Both              MAKE: Phonak              MODEL: Audeo L30-R              S/N: [id number], [id number]               WARRANTY: 10/21/2026    Procedures:   A listening check revealed weak sound from the right hearing aid and no sound from the left hearing aid. Both hearing aids were cleaned and checked, the microphones were changed, and the wax guards were changed. A listening check after cleaning revealed good sound from the right hearing aid and no sound from the left hearing aid.   The left  was replaced with a left 1M SDS 4.0  from stock, and a listening check revealed good sound. Both domes were replaced.       Plan:   The patient will be called and informed that her hearing aids are ready to .      Ru Hernandez, CCC-A  Licensed Audiologist  2024

## 2024-08-08 DIAGNOSIS — E11.29 TYPE 2 DIABETES MELLITUS WITH MICROALBUMINURIA, WITHOUT LONG-TERM CURRENT USE OF INSULIN (H): ICD-10-CM

## 2024-08-08 DIAGNOSIS — E03.9 HYPOTHYROIDISM, UNSPECIFIED TYPE: ICD-10-CM

## 2024-08-08 DIAGNOSIS — R80.9 TYPE 2 DIABETES MELLITUS WITH MICROALBUMINURIA, WITHOUT LONG-TERM CURRENT USE OF INSULIN (H): ICD-10-CM

## 2024-08-08 DIAGNOSIS — I10 ESSENTIAL HYPERTENSION: ICD-10-CM

## 2024-08-08 RX ORDER — LISINOPRIL AND HYDROCHLOROTHIAZIDE 20; 25 MG/1; MG/1
TABLET ORAL
Qty: 90 TABLET | Refills: 0 | Status: SHIPPED | OUTPATIENT
Start: 2024-08-08 | End: 2024-09-18

## 2024-08-08 RX ORDER — LEVOTHYROXINE SODIUM 88 UG/1
88 TABLET ORAL DAILY
Qty: 90 TABLET | Refills: 0 | Status: SHIPPED | OUTPATIENT
Start: 2024-08-08 | End: 2024-09-18

## 2024-08-08 RX ORDER — METOPROLOL SUCCINATE 50 MG/1
50 TABLET, EXTENDED RELEASE ORAL DAILY
Qty: 90 TABLET | Refills: 0 | Status: SHIPPED | OUTPATIENT
Start: 2024-08-08 | End: 2024-09-18

## 2024-08-08 RX ORDER — METFORMIN HCL 500 MG
TABLET, EXTENDED RELEASE 24 HR ORAL
Qty: 360 TABLET | Refills: 0 | Status: SHIPPED | OUTPATIENT
Start: 2024-08-08 | End: 2024-09-18

## 2024-08-30 ENCOUNTER — DOCUMENTATION ONLY (OUTPATIENT)
Dept: FAMILY MEDICINE | Facility: CLINIC | Age: 64
End: 2024-08-30
Payer: COMMERCIAL

## 2024-08-30 DIAGNOSIS — R80.9 TYPE 2 DIABETES MELLITUS WITH MICROALBUMINURIA, WITHOUT LONG-TERM CURRENT USE OF INSULIN (H): Primary | ICD-10-CM

## 2024-08-30 DIAGNOSIS — E78.2 MIXED HYPERLIPIDEMIA: ICD-10-CM

## 2024-08-30 DIAGNOSIS — E11.29 TYPE 2 DIABETES MELLITUS WITH MICROALBUMINURIA, WITHOUT LONG-TERM CURRENT USE OF INSULIN (H): Primary | ICD-10-CM

## 2024-08-30 DIAGNOSIS — I10 ESSENTIAL HYPERTENSION: ICD-10-CM

## 2024-08-30 DIAGNOSIS — E03.9 HYPOTHYROIDISM, UNSPECIFIED TYPE: ICD-10-CM

## 2024-08-30 NOTE — PROGRESS NOTES
Amarilis GWEN Vazquez has an upcoming lab appointment:    Future Appointments   Date Time Provider Department Center   9/11/2024  7:30 AM [facility]   9/18/2024  8:30 AM Kehr, Kristen M, PA-C [facility]       There is no order available. Please review and place either future orders or HMPO (Review of Health Maintenance Protocol Orders), as appropriate.    Health Maintenance Due   Topic    ANNUAL REVIEW OF HM ORDERS     HIV SCREENING     A1C      Selvin PEREZT

## 2024-09-10 ENCOUNTER — PATIENT OUTREACH (OUTPATIENT)
Dept: FAMILY MEDICINE | Facility: CLINIC | Age: 64
End: 2024-09-10
Payer: COMMERCIAL

## 2024-09-10 NOTE — TELEPHONE ENCOUNTER
Patient Quality Outreach    Patient is due for the following:   Breast Cancer Screening - Mammogram      Topic Date Due    Pneumococcal Vaccine (1 of 2 - PCV) Never done    Diptheria Tetanus Pertussis (DTAP/TDAP/TD) Vaccine (1 - Tdap) Never done    Zoster (Shingles) Vaccine (1 of 2) Never done    Flu Vaccine (1) 09/01/2024    COVID-19 Vaccine (1 - 2023-24 season) Never done       Next Steps:   Patient was scheduled for 09/18/24 with Kristen Kehr PA-C.     Type of outreach:    Chart review performed, no outreach needed.      Questions for provider review:    None           Duane Payne MA

## 2024-09-11 ENCOUNTER — LAB (OUTPATIENT)
Dept: LAB | Facility: CLINIC | Age: 64
End: 2024-09-11
Payer: COMMERCIAL

## 2024-09-11 DIAGNOSIS — R80.9 TYPE 2 DIABETES MELLITUS WITH MICROALBUMINURIA, WITHOUT LONG-TERM CURRENT USE OF INSULIN (H): ICD-10-CM

## 2024-09-11 DIAGNOSIS — E78.2 MIXED HYPERLIPIDEMIA: ICD-10-CM

## 2024-09-11 DIAGNOSIS — E11.29 TYPE 2 DIABETES MELLITUS WITH MICROALBUMINURIA, WITHOUT LONG-TERM CURRENT USE OF INSULIN (H): ICD-10-CM

## 2024-09-11 DIAGNOSIS — E03.9 HYPOTHYROIDISM, UNSPECIFIED TYPE: ICD-10-CM

## 2024-09-11 DIAGNOSIS — I10 ESSENTIAL HYPERTENSION: ICD-10-CM

## 2024-09-11 LAB
ANION GAP SERPL CALCULATED.3IONS-SCNC: 14 MMOL/L (ref 7–15)
BUN SERPL-MCNC: 11.4 MG/DL (ref 8–23)
CALCIUM SERPL-MCNC: 9.5 MG/DL (ref 8.8–10.4)
CHLORIDE SERPL-SCNC: 96 MMOL/L (ref 98–107)
CHOLEST SERPL-MCNC: 265 MG/DL
CREAT SERPL-MCNC: 0.64 MG/DL (ref 0.51–0.95)
CREAT UR-MCNC: 18.9 MG/DL
EGFRCR SERPLBLD CKD-EPI 2021: >90 ML/MIN/1.73M2
FASTING STATUS PATIENT QL REPORTED: YES
FASTING STATUS PATIENT QL REPORTED: YES
GLUCOSE SERPL-MCNC: 169 MG/DL (ref 70–99)
HBA1C MFR BLD: 6.8 % (ref 0–5.6)
HCO3 SERPL-SCNC: 25 MMOL/L (ref 22–29)
HDLC SERPL-MCNC: 37 MG/DL
LDLC SERPL CALC-MCNC: ABNORMAL MG/DL
LDLC SERPL DIRECT ASSAY-MCNC: 161 MG/DL
MICROALBUMIN UR-MCNC: <12 MG/L
MICROALBUMIN/CREAT UR: NORMAL MG/G{CREAT}
NONHDLC SERPL-MCNC: 228 MG/DL
POTASSIUM SERPL-SCNC: 4.2 MMOL/L (ref 3.4–5.3)
SODIUM SERPL-SCNC: 135 MMOL/L (ref 135–145)
TRIGL SERPL-MCNC: 431 MG/DL
TSH SERPL DL<=0.005 MIU/L-ACNC: 2.87 UIU/ML (ref 0.3–4.2)

## 2024-09-11 PROCEDURE — 83721 ASSAY OF BLOOD LIPOPROTEIN: CPT

## 2024-09-11 PROCEDURE — 84443 ASSAY THYROID STIM HORMONE: CPT

## 2024-09-11 PROCEDURE — 80061 LIPID PANEL: CPT

## 2024-09-11 PROCEDURE — 36415 COLL VENOUS BLD VENIPUNCTURE: CPT

## 2024-09-11 PROCEDURE — 80048 BASIC METABOLIC PNL TOTAL CA: CPT

## 2024-09-11 PROCEDURE — 82570 ASSAY OF URINE CREATININE: CPT

## 2024-09-11 PROCEDURE — 82043 UR ALBUMIN QUANTITATIVE: CPT

## 2024-09-11 PROCEDURE — 83036 HEMOGLOBIN GLYCOSYLATED A1C: CPT

## 2024-09-18 ENCOUNTER — OFFICE VISIT (OUTPATIENT)
Dept: FAMILY MEDICINE | Facility: CLINIC | Age: 64
End: 2024-09-18
Payer: COMMERCIAL

## 2024-09-18 VITALS
TEMPERATURE: 97.6 F | SYSTOLIC BLOOD PRESSURE: 134 MMHG | WEIGHT: 166 LBS | BODY MASS INDEX: 27.66 KG/M2 | HEIGHT: 65 IN | HEART RATE: 90 BPM | DIASTOLIC BLOOD PRESSURE: 80 MMHG | RESPIRATION RATE: 16 BRPM | OXYGEN SATURATION: 97 %

## 2024-09-18 DIAGNOSIS — E03.9 HYPOTHYROIDISM, UNSPECIFIED TYPE: ICD-10-CM

## 2024-09-18 DIAGNOSIS — E78.2 MIXED HYPERLIPIDEMIA: ICD-10-CM

## 2024-09-18 DIAGNOSIS — E11.29 TYPE 2 DIABETES MELLITUS WITH MICROALBUMINURIA, WITHOUT LONG-TERM CURRENT USE OF INSULIN (H): Primary | ICD-10-CM

## 2024-09-18 DIAGNOSIS — R80.9 TYPE 2 DIABETES MELLITUS WITH MICROALBUMINURIA, WITHOUT LONG-TERM CURRENT USE OF INSULIN (H): Primary | ICD-10-CM

## 2024-09-18 DIAGNOSIS — I10 ESSENTIAL HYPERTENSION: ICD-10-CM

## 2024-09-18 LAB — VIT B12 SERPL-MCNC: 1127 PG/ML (ref 232–1245)

## 2024-09-18 PROCEDURE — 99214 OFFICE O/P EST MOD 30 MIN: CPT | Performed by: PHYSICIAN ASSISTANT

## 2024-09-18 PROCEDURE — 82607 VITAMIN B-12: CPT | Performed by: PHYSICIAN ASSISTANT

## 2024-09-18 PROCEDURE — G2211 COMPLEX E/M VISIT ADD ON: HCPCS | Performed by: PHYSICIAN ASSISTANT

## 2024-09-18 PROCEDURE — 36415 COLL VENOUS BLD VENIPUNCTURE: CPT | Performed by: PHYSICIAN ASSISTANT

## 2024-09-18 RX ORDER — METFORMIN HCL 500 MG
TABLET, EXTENDED RELEASE 24 HR ORAL
Qty: 360 TABLET | Refills: 3 | Status: SHIPPED | OUTPATIENT
Start: 2024-09-18

## 2024-09-18 RX ORDER — LIRAGLUTIDE 6 MG/ML
INJECTION SUBCUTANEOUS
Qty: 27 ML | Refills: 3 | Status: SHIPPED | OUTPATIENT
Start: 2024-09-18

## 2024-09-18 RX ORDER — LEVOTHYROXINE SODIUM 88 UG/1
88 TABLET ORAL DAILY
Qty: 90 TABLET | Refills: 3 | Status: SHIPPED | OUTPATIENT
Start: 2024-09-18

## 2024-09-18 RX ORDER — METOPROLOL SUCCINATE 50 MG/1
50 TABLET, EXTENDED RELEASE ORAL DAILY
Qty: 90 TABLET | Refills: 3 | Status: SHIPPED | OUTPATIENT
Start: 2024-09-18

## 2024-09-18 RX ORDER — LISINOPRIL AND HYDROCHLOROTHIAZIDE 20; 25 MG/1; MG/1
TABLET ORAL
Qty: 90 TABLET | Refills: 3 | Status: SHIPPED | OUTPATIENT
Start: 2024-09-18

## 2024-09-18 ASSESSMENT — PAIN SCALES - GENERAL: PAINLEVEL: NO PAIN (0)

## 2024-09-18 NOTE — PROGRESS NOTES
"  Assessment & Plan     Type 2 diabetes mellitus with microalbuminuria, without long-term current use of insulin (H)  She has concerns about metformin causing tingling in her extremities.   She already takes Vitamin B supplements.   Consider getting off of the metformin since she has had such success with the addition of victoza and lifestyle modifications.   - Vitamin B12; Future  - liraglutide (VICTOZA PEN) 18 MG/3ML solution; 1.8 mg under skin once daily  - lisinopril-hydrochlorothiazide (ZESTORETIC) 20-25 MG tablet; TAKE 1 TABLET BY MOUTH EVERY DAY  - metFORMIN (GLUCOPHAGE XR) 500 MG 24 hr tablet; TAKE 2 TABLETS BY MOUTH TWICE A DAY  - **Basic metabolic panel FUTURE 6mo; Future  - **Hemoglobin A1c FUTURE 6mo; Future  - Vitamin B12    Hypothyroidism, unspecified type  Stable, refills given   - levothyroxine (SYNTHROID/LEVOTHROID) 88 MCG tablet; Take 1 tablet (88 mcg) by mouth daily.    Essential hypertension  Doing well, refills given   - lisinopril-hydrochlorothiazide (ZESTORETIC) 20-25 MG tablet; TAKE 1 TABLET BY MOUTH EVERY DAY  - metoprolol succinate ER (TOPROL XL) 50 MG 24 hr tablet; Take 1 tablet (50 mg) by mouth daily.    Mixed hyperlipidemia  Declines statin. Aware of risks, benefits and side effects.     The longitudinal plan of care for the diagnosis(es)/condition(s) as documented were addressed during this visit. Due to the added complexity in care, I will continue to support Jyoti in the subsequent management and with ongoing continuity of care.      Plan lab only appointment for recheck of BMP and A1C in 6 months.   I will see her back in the clinic in 1 year.         BMI  Estimated body mass index is 27.41 kg/m  as calculated from the following:    Height as of this encounter: 1.657 m (5' 5.25\").    Weight as of this encounter: 75.3 kg (166 lb).   Weight management plan: Discussed healthy diet and exercise guidelines          Subjective   Jyoti is a 64 year old, presenting for the following health " "issues:  Diabetes        9/18/2024     8:13 AM   Additional Questions   Roomed by Duane JACQUES MA     HPI       Diabetes and blood pressure check.      Jyoti is here today for follow up of the following conditions:    Type 2 diabetes: managed with metformin and victoza  Hypothyroid: managed with levothyroxine 88 mcg daily  Hypertension: managed with metoprolol, lisinopril /  hydrochlorothiazide   Hyperlipidemia: declines statin therapy      She continues to work on making healthy lifestyle change with diet and exercise. She recently picked up a book about eating for her particular blood type.       Review of Systems  Constitutional, HEENT, cardiovascular, pulmonary, GI, , musculoskeletal, neuro, skin, endocrine and psych systems are negative, except as otherwise noted.      Objective    /80   Pulse 90   Temp 97.6  F (36.4  C) (Tympanic)   Resp 16   Ht 1.657 m (5' 5.25\")   Wt 75.3 kg (166 lb)   LMP 02/13/2013 (Approximate)   SpO2 97%   Breastfeeding No   BMI 27.41 kg/m    Body mass index is 27.41 kg/m .  Physical Exam   GENERAL: alert and no distress  RESP: lungs clear to auscultation - no rales, rhonchi or wheezes  CV: regular rate and rhythm, normal S1 S2, no S3 or S4, no murmur, click or rub, no peripheral edema   MS: no gross musculoskeletal defects noted, no edema  SKIN: no suspicious lesions or rashes  NEURO: Normal strength and tone, mentation intact and speech normal  PSYCH: mentation appears normal, affect normal/bright  Diabetic foot exam: normal DP and PT pulses, no trophic changes or ulcerative lesions, normal sensory exam, and normal monofilament exam    Lab on 09/11/2024   Component Date Value Ref Range Status    Cholesterol 09/11/2024 265 (H)  <200 mg/dL Final    Triglycerides 09/11/2024 431 (H)  <150 mg/dL Final    Direct Measure HDL 09/11/2024 37 (L)  >=50 mg/dL Final    LDL Cholesterol Calculated 09/11/2024    Final    Cannot estimate LDL when triglyceride exceeds 400 mg/dL    Non HDL " Cholesterol 09/11/2024 228 (H)  <130 mg/dL Final    Patient Fasting > 8hrs? 09/11/2024 Yes   Final    Hemoglobin A1C 09/11/2024 6.8 (H)  0.0 - 5.6 % Final    Normal <5.7%   Prediabetes 5.7-6.4%    Diabetes 6.5% or higher     Note: Adopted from ADA consensus guidelines.    TSH 09/11/2024 2.87  0.30 - 4.20 uIU/mL Final    Creatinine Urine mg/dL 09/11/2024 18.9  mg/dL Final    The reference ranges have not been established in urine creatinine. The results should be integrated into the clinical context for interpretation.    Albumin Urine mg/L 09/11/2024 <12.0  mg/L Final    The reference ranges have not been established in urine albumin. The results should be integrated into the clinical context for interpretation.    Albumin Urine mg/g Cr 09/11/2024    Final    Unable to calculate, urine albumin and/or urine creatinine is outside detectable limits.  Microalbuminuria is defined as an albumin:creatinine ratio of 17 to 299 for males and 25 to 299 for females. A ratio of albumin:creatinine of 300 or higher is indicative of overt proteinuria.  Due to biologic variability, positive results should be confirmed by a second, first-morning random or 24-hour timed urine specimen. If there is discrepancy, a third specimen is recommended. When 2 out of 3 results are in the microalbuminuria range, this is evidence for incipient nephropathy and warrants increased efforts at glucose control, blood pressure control, and institution of therapy with an angiotensin-converting-enzyme (ACE) inhibitor (if the patient can tolerate it).      Sodium 09/11/2024 135  135 - 145 mmol/L Final    Potassium 09/11/2024 4.2  3.4 - 5.3 mmol/L Final    Chloride 09/11/2024 96 (L)  98 - 107 mmol/L Final    Carbon Dioxide (CO2) 09/11/2024 25  22 - 29 mmol/L Final    Anion Gap 09/11/2024 14  7 - 15 mmol/L Final    Urea Nitrogen 09/11/2024 11.4  8.0 - 23.0 mg/dL Final    Creatinine 09/11/2024 0.64  0.51 - 0.95 mg/dL Final    GFR Estimate 09/11/2024 >90  >60  mL/min/1.73m2 Final    eGFR calculated using 2021 CKD-EPI equation.    Calcium 09/11/2024 9.5  8.8 - 10.4 mg/dL Final    Reference intervals for this test were updated on 7/16/2024 to reflect our healthy population more accurately. There may be differences in the flagging of prior results with similar values performed with this method. Those prior results can be interpreted in the context of the updated reference intervals.    Glucose 09/11/2024 169 (H)  70 - 99 mg/dL Final    Patient Fasting > 8hrs? 09/11/2024 Yes   Final    LDL Cholesterol Direct 09/11/2024 161 (H)  <100 mg/dL Final    Age 2-19 years:  Desirable: < 110 mg/dL   Borderline High: 110-129 mg/dL   High: >= 130 mg/dL    Age 20 years and older:  Desirable: < 100 mg/dL  Above Desirable: 100-129 mg/dL   Borderline High: 130-159 mg/dL   High: 160-189 mg/dL   Very High: >= 190 mg/dL           Signed Electronically by: Kristen M. Kehr, PA-C

## 2024-11-18 ENCOUNTER — MYC MEDICAL ADVICE (OUTPATIENT)
Dept: FAMILY MEDICINE | Facility: CLINIC | Age: 64
End: 2024-11-18
Payer: COMMERCIAL

## 2024-11-19 NOTE — TELEPHONE ENCOUNTER
Routing to  to see if clinic received form.     Kyara Brandt, DORONN, RN   [facility]

## 2024-11-23 ENCOUNTER — HEALTH MAINTENANCE LETTER (OUTPATIENT)
Age: 64
End: 2024-11-23

## 2024-12-27 ENCOUNTER — MYC MEDICAL ADVICE (OUTPATIENT)
Dept: FAMILY MEDICINE | Facility: CLINIC | Age: 64
End: 2024-12-27
Payer: COMMERCIAL

## 2025-01-14 ENCOUNTER — PATIENT OUTREACH (OUTPATIENT)
Dept: CARE COORDINATION | Facility: CLINIC | Age: 65
End: 2025-01-14
Payer: COMMERCIAL

## 2025-01-15 ENCOUNTER — PATIENT OUTREACH (OUTPATIENT)
Dept: CARE COORDINATION | Facility: CLINIC | Age: 65
End: 2025-01-15
Payer: COMMERCIAL

## 2025-01-22 ENCOUNTER — PATIENT OUTREACH (OUTPATIENT)
Dept: CARE COORDINATION | Facility: CLINIC | Age: 65
End: 2025-01-22
Payer: COMMERCIAL

## 2025-01-23 DIAGNOSIS — Z12.11 COLON CANCER SCREENING: ICD-10-CM

## 2025-01-29 ENCOUNTER — PATIENT OUTREACH (OUTPATIENT)
Dept: CARE COORDINATION | Facility: CLINIC | Age: 65
End: 2025-01-29
Payer: COMMERCIAL

## 2025-01-31 ENCOUNTER — HOSPITAL ENCOUNTER (EMERGENCY)
Facility: CLINIC | Age: 65
Discharge: HOME | End: 2025-01-31
Attending: EMERGENCY MEDICINE | Admitting: EMERGENCY MEDICINE
Payer: COMMERCIAL

## 2025-01-31 ENCOUNTER — APPOINTMENT (OUTPATIENT)
Dept: GENERAL RADIOLOGY | Facility: CLINIC | Age: 65
End: 2025-01-31
Attending: EMERGENCY MEDICINE
Payer: COMMERCIAL

## 2025-01-31 VITALS
BODY MASS INDEX: 26.59 KG/M2 | HEART RATE: 85 BPM | SYSTOLIC BLOOD PRESSURE: 120 MMHG | DIASTOLIC BLOOD PRESSURE: 73 MMHG | RESPIRATION RATE: 20 BRPM | TEMPERATURE: 98.7 F | OXYGEN SATURATION: 97 % | WEIGHT: 161 LBS

## 2025-01-31 DIAGNOSIS — E86.0 MILD DEHYDRATION: ICD-10-CM

## 2025-01-31 DIAGNOSIS — R53.83 FATIGUE, UNSPECIFIED TYPE: ICD-10-CM

## 2025-01-31 DIAGNOSIS — E83.42 HYPOMAGNESEMIA: ICD-10-CM

## 2025-01-31 DIAGNOSIS — R68.89 FEELING UNWELL: ICD-10-CM

## 2025-01-31 DIAGNOSIS — E11.65 TYPE 2 DIABETES MELLITUS WITH HYPERGLYCEMIA, WITHOUT LONG-TERM CURRENT USE OF INSULIN (H): ICD-10-CM

## 2025-01-31 DIAGNOSIS — R68.2 DRY MOUTH: ICD-10-CM

## 2025-01-31 DIAGNOSIS — R53.1 WEAKNESS: ICD-10-CM

## 2025-01-31 LAB
ALBUMIN SERPL BCG-MCNC: 3.7 G/DL (ref 3.5–5.2)
ALBUMIN UR-MCNC: NEGATIVE MG/DL
ALP SERPL-CCNC: 95 U/L (ref 40–150)
ALT SERPL W P-5'-P-CCNC: 14 U/L (ref 0–50)
ANION GAP SERPL CALCULATED.3IONS-SCNC: 20 MMOL/L (ref 7–15)
APPEARANCE UR: CLEAR
AST SERPL W P-5'-P-CCNC: 15 U/L (ref 0–45)
ATRIAL RATE - MUSE: 90 BPM
BASOPHILS # BLD AUTO: 0.1 10E3/UL (ref 0–0.2)
BASOPHILS NFR BLD AUTO: 0 %
BILIRUB SERPL-MCNC: 1.1 MG/DL
BILIRUB UR QL STRIP: NEGATIVE
BUN SERPL-MCNC: 11.3 MG/DL (ref 8–23)
CALCIUM SERPL-MCNC: 9.2 MG/DL (ref 8.8–10.4)
CHLORIDE SERPL-SCNC: 91 MMOL/L (ref 98–107)
COLOR UR AUTO: ABNORMAL
CREAT SERPL-MCNC: 0.55 MG/DL (ref 0.51–0.95)
DIASTOLIC BLOOD PRESSURE - MUSE: NORMAL MMHG
EGFRCR SERPLBLD CKD-EPI 2021: >90 ML/MIN/1.73M2
EOSINOPHIL # BLD AUTO: 0.1 10E3/UL (ref 0–0.7)
EOSINOPHIL NFR BLD AUTO: 1 %
ERYTHROCYTE [DISTWIDTH] IN BLOOD BY AUTOMATED COUNT: 13.4 % (ref 10–15)
GLUCOSE SERPL-MCNC: 268 MG/DL (ref 70–99)
GLUCOSE UR STRIP-MCNC: NEGATIVE MG/DL
HCO3 SERPL-SCNC: 19 MMOL/L (ref 22–29)
HCT VFR BLD AUTO: 40.6 % (ref 35–47)
HGB BLD-MCNC: 14.2 G/DL (ref 11.7–15.7)
HGB UR QL STRIP: NEGATIVE
IMM GRANULOCYTES # BLD: 0.1 10E3/UL
IMM GRANULOCYTES NFR BLD: 1 %
INTERPRETATION ECG - MUSE: NORMAL
KETONES UR STRIP-MCNC: 10 MG/DL
LEUKOCYTE ESTERASE UR QL STRIP: ABNORMAL
LYMPHOCYTES # BLD AUTO: 1.9 10E3/UL (ref 0.8–5.3)
LYMPHOCYTES NFR BLD AUTO: 12 %
MAGNESIUM SERPL-MCNC: 1.6 MG/DL (ref 1.7–2.3)
MCH RBC QN AUTO: 29.8 PG (ref 26.5–33)
MCHC RBC AUTO-ENTMCNC: 35 G/DL (ref 31.5–36.5)
MCV RBC AUTO: 85 FL (ref 78–100)
MONOCYTES # BLD AUTO: 0.8 10E3/UL (ref 0–1.3)
MONOCYTES NFR BLD AUTO: 6 %
NEUTROPHILS # BLD AUTO: 12.3 10E3/UL (ref 1.6–8.3)
NEUTROPHILS NFR BLD AUTO: 81 %
NITRATE UR QL: NEGATIVE
NRBC # BLD AUTO: 0 10E3/UL
NRBC BLD AUTO-RTO: 0 /100
P AXIS - MUSE: 50 DEGREES
PH UR STRIP: 6.5 [PH] (ref 5–7)
PHOSPHATE SERPL-MCNC: 3.3 MG/DL (ref 2.5–4.5)
PLATELET # BLD AUTO: 295 10E3/UL (ref 150–450)
POTASSIUM SERPL-SCNC: 3.6 MMOL/L (ref 3.4–5.3)
PR INTERVAL - MUSE: 158 MS
PROT SERPL-MCNC: 7.3 G/DL (ref 6.4–8.3)
QRS DURATION - MUSE: 78 MS
QT - MUSE: 384 MS
QTC - MUSE: 469 MS
R AXIS - MUSE: 10 DEGREES
RBC # BLD AUTO: 4.77 10E6/UL (ref 3.8–5.2)
RBC URINE: <1 /HPF
SODIUM SERPL-SCNC: 130 MMOL/L (ref 135–145)
SP GR UR STRIP: 1.01 (ref 1–1.03)
SQUAMOUS EPITHELIAL: 1 /HPF
SYSTOLIC BLOOD PRESSURE - MUSE: NORMAL MMHG
T AXIS - MUSE: 48 DEGREES
UROBILINOGEN UR STRIP-MCNC: NORMAL MG/DL
VENTRICULAR RATE- MUSE: 90 BPM
WBC # BLD AUTO: 15.3 10E3/UL (ref 4–11)
WBC URINE: 2 /HPF

## 2025-01-31 PROCEDURE — 84100 ASSAY OF PHOSPHORUS: CPT | Performed by: EMERGENCY MEDICINE

## 2025-01-31 PROCEDURE — 99285 EMERGENCY DEPT VISIT HI MDM: CPT | Mod: 25 | Performed by: EMERGENCY MEDICINE

## 2025-01-31 PROCEDURE — 81003 URINALYSIS AUTO W/O SCOPE: CPT | Performed by: EMERGENCY MEDICINE

## 2025-01-31 PROCEDURE — 99285 EMERGENCY DEPT VISIT HI MDM: CPT | Performed by: EMERGENCY MEDICINE

## 2025-01-31 PROCEDURE — 83735 ASSAY OF MAGNESIUM: CPT | Performed by: EMERGENCY MEDICINE

## 2025-01-31 PROCEDURE — 71045 X-RAY EXAM CHEST 1 VIEW: CPT

## 2025-01-31 PROCEDURE — 36415 COLL VENOUS BLD VENIPUNCTURE: CPT | Performed by: EMERGENCY MEDICINE

## 2025-01-31 PROCEDURE — 85025 COMPLETE CBC W/AUTO DIFF WBC: CPT | Performed by: EMERGENCY MEDICINE

## 2025-01-31 PROCEDURE — 80053 COMPREHEN METABOLIC PANEL: CPT | Performed by: EMERGENCY MEDICINE

## 2025-01-31 PROCEDURE — 258N000003 HC RX IP 258 OP 636: Performed by: EMERGENCY MEDICINE

## 2025-01-31 PROCEDURE — 93005 ELECTROCARDIOGRAM TRACING: CPT | Performed by: EMERGENCY MEDICINE

## 2025-01-31 PROCEDURE — 96365 THER/PROPH/DIAG IV INF INIT: CPT | Performed by: EMERGENCY MEDICINE

## 2025-01-31 PROCEDURE — 250N000011 HC RX IP 250 OP 636: Performed by: EMERGENCY MEDICINE

## 2025-01-31 PROCEDURE — 93010 ELECTROCARDIOGRAM REPORT: CPT | Performed by: EMERGENCY MEDICINE

## 2025-01-31 RX ORDER — MAGNESIUM SULFATE HEPTAHYDRATE 40 MG/ML
4 INJECTION, SOLUTION INTRAVENOUS ONCE
Status: COMPLETED | OUTPATIENT
Start: 2025-01-31 | End: 2025-01-31

## 2025-01-31 RX ORDER — CHLORHEXIDINE GLUCONATE ORAL RINSE 1.2 MG/ML
15 SOLUTION DENTAL 2 TIMES DAILY
COMMUNITY
Start: 2025-01-17

## 2025-01-31 RX ADMIN — SODIUM CHLORIDE 1000 ML: 9 INJECTION, SOLUTION INTRAVENOUS at 03:52

## 2025-01-31 RX ADMIN — MAGNESIUM SULFATE HEPTAHYDRATE 4 G: 40 INJECTION, SOLUTION INTRAVENOUS at 04:19

## 2025-01-31 RX ADMIN — SODIUM CHLORIDE 1000 ML: 9 INJECTION, SOLUTION INTRAVENOUS at 04:20

## 2025-01-31 ASSESSMENT — COLUMBIA-SUICIDE SEVERITY RATING SCALE - C-SSRS
1. IN THE PAST MONTH, HAVE YOU WISHED YOU WERE DEAD OR WISHED YOU COULD GO TO SLEEP AND NOT WAKE UP?: NO
2. HAVE YOU ACTUALLY HAD ANY THOUGHTS OF KILLING YOURSELF IN THE PAST MONTH?: NO
6. HAVE YOU EVER DONE ANYTHING, STARTED TO DO ANYTHING, OR PREPARED TO DO ANYTHING TO END YOUR LIFE?: NO

## 2025-01-31 ASSESSMENT — ACTIVITIES OF DAILY LIVING (ADL)
ADLS_ACUITY_SCORE: 41

## 2025-01-31 NOTE — ED TRIAGE NOTES
Patient dx with COVID on 1/10 she states she has not been the same since. Main complaints are fatigue. Body aches, lack of appetite, and dry mouth.

## 2025-01-31 NOTE — ED PROVIDER NOTES
"  History     Chief Complaint   Patient presents with    Generalized Weakness     HPI  History per patient, medical records    This is a 65-year-old female, history of chronic kidney disease, type 2 diabetes, coronary artery disease, hypertension, hyperlipidemia, hypothyroidism presenting with weakness.  Patient believes she was exposed to COVID on 1/7/2025.  She started having \"chest symptoms\" on 1/10/2025 and was diagnosed COVID-positive on 1/17/2025.  She notes her chest symptoms seem to have cleared but instead she has continued to feel shaky, fatigue, and generally unwell.  She states she is so weak she can barely stay standing.  She has palpitations.  She notes heaviness all over, especially in her shoulders.  She has had some chills, no fevers.  Her stools have been loose.  She vomited twice over the course of illness.  She notes her blood sugars were quite high when she was very sick, over 400.  More recently they have been in the 200s.  She has had decreased appetite.  She has not been drinking as much fluids as usual.    Allergies:  No Known Allergies    Problem List:    Patient Active Problem List    Diagnosis Date Noted    Chronic kidney disease, stage 1 03/18/2022     Priority: Medium    Insomnia, unspecified type 07/17/2018     Priority: Medium    Type 2 diabetes mellitus with microalbuminuria, without long-term current use of insulin (H) 07/10/2017     Priority: Medium    Coronary artery disease involving native coronary artery of native heart without angina pectoris 02/04/2016     Priority: Medium    Mixed hyperlipidemia 02/04/2016     Priority: Medium    CAD (coronary artery disease) 05/08/2013     Priority: Medium     [facility]  [facility]Coronary angiogram showing 70% R coronary artery lesion with normal stress test and flow reserve, thus, intervention is not recommended at this time.   Requires aggressive risk factor modification.      Essential hypertension 01/27/2012     Priority: " Medium    Hypothyroidism 2012     Priority: Medium        Past Medical History:    Past Medical History:   Diagnosis Date    Coronary artery disease     Diabetic eye exam (H) 11    Diabetic eye exam (H) 12, 14    Other and unspecified hyperlipidemia     Type II or unspecified type diabetes mellitus without mention of complication, not stated as uncontrolled     Unspecified essential hypertension     Unspecified hypothyroidism        Past Surgical History:    Past Surgical History:   Procedure Laterality Date    D & C         Family History:    Family History   Problem Relation Age of Onset    Cerebrovascular Disease Mother     Cancer Mother         lung    C.A.D. Mother         MI age 50?    C.A.D. Maternal Grandmother     Diabetes Maternal Grandmother     Thyroid Disease Maternal Grandmother     C.A.D. Maternal Grandfather     C.A.D. Paternal Grandfather 50        MI    Thyroid Disease Son     Other - See Comments Daughter         was in MVA this summer, lost her leg    Breast Cancer No family hx of     Gynecology No family hx of         No ovarian, endometrial cancer    Osteoporosis No family hx of     Ovarian Cancer No family hx of     Cancer - colorectal No family hx of        Social History:  Marital Status:   [2]  Social History     Tobacco Use    Smoking status: Former     Current packs/day: 0.00     Average packs/day: 1 pack/day for 29.0 years (29.0 ttl pk-yrs)     Types: Cigarettes     Start date: 1976     Quit date: 2005     Years since quittin.0    Smokeless tobacco: Never    Tobacco comments:     quit 8-9 years ago   Vaping Use    Vaping status: Never Used   Substance Use Topics    Alcohol use: Yes     Comment: seldom    Drug use: No        Medications:    chlorhexidine (PERIDEX) 0.12 % solution  Alpha Lipoic Acid 200 MG CAPS  aspirin (ASA) 81 MG EC tablet  B Complex-C-Folic Acid (SUPER B COMPLEX/FA/VIT C) TABS  blood glucose (NO BRAND SPECIFIED) test  strip  blood glucose monitoring (NO BRAND SPECIFIED) meter device kit  Boswellia-Glucosamine-Vit D (GLUCOSAMINE COMPLEX -BOSWELLIA) TABS  co-enzyme Q-10 100 MG/5ML LIQD liquid  cyanocobalamin 1000 MCG SUBL sublingual tablet  Glucosamine HCl-MSM (SM GLUCOSAMINE HCL-MSM) 750-750 MG TABS  insulin pen needle 32G X 4 MM  levothyroxine (SYNTHROID/LEVOTHROID) 88 MCG tablet  liraglutide (VICTOZA PEN) 18 MG/3ML solution  lisinopril-hydrochlorothiazide (ZESTORETIC) 20-25 MG tablet  magnesium 100 MG TABS  metFORMIN (GLUCOPHAGE XR) 500 MG 24 hr tablet  metoprolol succinate ER (TOPROL XL) 50 MG 24 hr tablet  Misc Natural Products (SAMBUCUS ELDERBERRY IMMUNE) SYRP  NEW MED  Omega-3 Fatty Acids (FISH OIL ULTRA) 1400 MG CAPS  thin (NO BRAND SPECIFIED) lancets  Turmeric Curcumin 500 MG CAPS          Review of Systems  All other ROS reviewed and are negative or non-contributory except as stated in HPI.     Physical Exam   BP: (!) 175/90  Pulse: 99  Temp: 98.7  F (37.1  C)  Resp: 20  Weight: 73 kg (161 lb)  SpO2: 96 %      Physical Exam  Vitals and nursing note reviewed.   Constitutional:       Appearance: She is normal weight.      Comments: Pleasant, anxious female sitting in the bed   HENT:      Head: Normocephalic.      Ears:      Comments: Significant bilateral TM scarring, left greater than right     Nose: Nose normal.      Mouth/Throat:      Mouth: Mucous membranes are dry.      Pharynx: Oropharynx is clear.   Eyes:      General: No scleral icterus.     Extraocular Movements: Extraocular movements intact.      Comments: Mild conjunctival injection bilaterally   Cardiovascular:      Rate and Rhythm: Regular rhythm. Tachycardia present.      Pulses: Normal pulses.      Heart sounds: Normal heart sounds.   Pulmonary:      Effort: Pulmonary effort is normal.      Breath sounds: Normal breath sounds.   Abdominal:      Palpations: Abdomen is soft.      Tenderness: There is no abdominal tenderness.   Musculoskeletal:         General:  Normal range of motion.      Cervical back: Normal range of motion and neck supple.   Skin:     General: Skin is warm and dry.      Findings: No rash.   Neurological:      General: No focal deficit present.      Mental Status: She is alert.   Psychiatric:         Behavior: Behavior normal.         ED Course (with Medical Decision Making)    Pt seen and examined by me.  RN and EPIC notes reviewed.       Patient with recent diagnosis of COVID.  Continues to have symptoms of fatigue, palpitations, weakness.  Blood sugars have apparently been running quite high.  I suspect she is dehydrated.  On exam she is tachycardic, hypertensive.    Plan IV, fluids, labs.  Monitor.    EKG was done.  This shows normal sinus rhythm with a rate of 90.  No ectopy.  No ST segment elevation.  Machine reads low voltage QRS.  Reviewed by myself at 3:40 AM.    Patient has elevated white blood cell count at 15.3, hemoglobin 14.2, platelets 295.  Normal phosphorus  Magnesium low at 1.6.  Replacement ordered  CMP shows mildly low sodium at 130.  Potassium 3.6.  Bicarb 19.  Anion gap 20.  BUN/creatinine 11.3/0.55.  Chloride 91.  Glucose 268.  Normal LFTs    I gave the patient 2 L of saline and she was able to make urine.  No evidence for infection.  Slight amount of ketones.    I did do chest x-ray which did not show any evidence for acute abnormality.    She has been able to ambulate into the department and to the bathroom and back.  I think at this point she is safe to return home.  I recommend drinking lots of fluids, frequent small sips.  Continue to get plenty of rest.  I would like her to repeat her labs next week in her doctor's office for recheck.    In the meantime if she has any significant worsening, changes or concerns return at any time.        Procedures    Results for orders placed or performed during the hospital encounter of 01/31/25 (from the past 24 hours)   EKG 12-lead, tracing only   Result Value Ref Range    Systolic Blood  Pressure  mmHg    Diastolic Blood Pressure  mmHg    Ventricular Rate 90 BPM    Atrial Rate 90 BPM    OH Interval 158 ms    QRS Duration 78 ms     ms    QTc 469 ms    P Axis 50 degrees    R AXIS 10 degrees    T Axis 48 degrees    Interpretation ECG       Sinus rhythm  Low voltage QRS  Borderline ECG  When compared with ECG of 21-Aug-2020 08:59,  No significant change was found     CBC with platelets differential    Narrative    The following orders were created for panel order CBC with platelets differential.  Procedure                               Abnormality         Status                     ---------                               -----------         ------                     CBC with platelets and d...[[id number]]  Abnormal            Final result                 Please view results for these tests on the individual orders.   Comprehensive metabolic panel   Result Value Ref Range    Sodium 130 (L) 135 - 145 mmol/L    Potassium 3.6 3.4 - 5.3 mmol/L    Carbon Dioxide (CO2) 19 (L) 22 - 29 mmol/L    Anion Gap 20 (H) 7 - 15 mmol/L    Urea Nitrogen 11.3 8.0 - 23.0 mg/dL    Creatinine 0.55 0.51 - 0.95 mg/dL    GFR Estimate >90 >60 mL/min/1.73m2    Calcium 9.2 8.8 - 10.4 mg/dL    Chloride 91 (L) 98 - 107 mmol/L    Glucose 268 (H) 70 - 99 mg/dL    Alkaline Phosphatase 95 40 - 150 U/L    AST 15 0 - 45 U/L    ALT 14 0 - 50 U/L    Protein Total 7.3 6.4 - 8.3 g/dL    Albumin 3.7 3.5 - 5.2 g/dL    Bilirubin Total 1.1 <=1.2 mg/dL   Magnesium   Result Value Ref Range    Magnesium 1.6 (L) 1.7 - 2.3 mg/dL   Phosphorus   Result Value Ref Range    Phosphorus 3.3 2.5 - 4.5 mg/dL   CBC with platelets and differential   Result Value Ref Range    WBC Count 15.3 (H) 4.0 - 11.0 10e3/uL    RBC Count 4.77 3.80 - 5.20 10e6/uL    Hemoglobin 14.2 11.7 - 15.7 g/dL    Hematocrit 40.6 35.0 - 47.0 %    MCV 85 78 - 100 fL    MCH 29.8 26.5 - 33.0 pg    MCHC 35.0 31.5 - 36.5 g/dL    RDW 13.4 10.0 - 15.0 %    Platelet Count 295 150 - 450  10e3/uL    % Neutrophils 81 %    % Lymphocytes 12 %    % Monocytes 6 %    % Eosinophils 1 %    % Basophils 0 %    % Immature Granulocytes 1 %    NRBCs per 100 WBC 0 <1 /100    Absolute Neutrophils 12.3 (H) 1.6 - 8.3 10e3/uL    Absolute Lymphocytes 1.9 0.8 - 5.3 10e3/uL    Absolute Monocytes 0.8 0.0 - 1.3 10e3/uL    Absolute Eosinophils 0.1 0.0 - 0.7 10e3/uL    Absolute Basophils 0.1 0.0 - 0.2 10e3/uL    Absolute Immature Granulocytes 0.1 <=0.4 10e3/uL    Absolute NRBCs 0.0 10e3/uL   UA with Microscopic reflex to Culture    Specimen: Urine, Clean Catch   Result Value Ref Range    Color Urine Light Yellow Colorless, Straw, Light Yellow, Yellow    Appearance Urine Clear Clear    Glucose Urine Negative Negative mg/dL    Bilirubin Urine Negative Negative    Ketones Urine 10 (A) Negative mg/dL    Specific Gravity Urine 1.006 1.003 - 1.035    Blood Urine Negative Negative    pH Urine 6.5 5.0 - 7.0    Protein Albumin Urine Negative Negative mg/dL    Urobilinogen Urine Normal Normal, 2.0 mg/dL    Nitrite Urine Negative Negative    Leukocyte Esterase Urine Trace (A) Negative    RBC Urine <1 <=2 /HPF    WBC Urine 2 <=5 /HPF    Squamous Epithelials Urine 1 <=1 /HPF    Narrative    Urine Culture not indicated   XR Chest Port 1 View    Narrative    EXAM: XR CHEST PORT 1 VIEW  LOCATION: [facility]  DATE: 1/31/2025    INDICATION: cough  COMPARISON: None.      Impression    IMPRESSION: No focal airspace opacity, pleural effusion or pneumothorax. Heart size and poorly vascularity are normal. Mild atherosclerotic calcification in the aortic knob.       Medications   sodium chloride 0.9% BOLUS 1,000 mL (0 mLs Intravenous Stopped 1/31/25 0452)   magnesium sulfate 4 g in 100 mL sterile water intermittent infusion (0 g Intravenous Stopped 1/31/25 0548)   sodium chloride 0.9% BOLUS 1,000 mL (0 mLs Intravenous Stopped 1/31/25 0548)       Assessments & Plan     I have reviewed the findings, diagnosis, plan and  need for follow up with the patient.    New Prescriptions    No medications on file       Final diagnoses:   Weakness   Fatigue, unspecified type   Hypomagnesemia   Mild dehydration   Feeling unwell   Type 2 diabetes mellitus with hyperglycemia, without long-term current use of insulin (H)   Dry mouth     Disposition: Patient discharged home in stable condition.  Plan as above.  Return for concerns.     Note: Chart documentation done in part with Dragon Voice Recognition software. Although reviewed after completion, some word and grammatical errors may remain.   1/31/2025   [facility] EMERGENCY DEPT       Rozina Song MD  01/31/25 0679

## 2025-01-31 NOTE — DISCHARGE INSTRUCTIONS
Continue to get your blood sugars under good control.    Drink lots of fluids, frequent small sips.    Get plenty of rest.    Your chest x-ray was clear.  Your urine does not show any evidence for infection but it does show that you are mildly dehydrated.    I would like you to make an appointment to follow-up in clinic in a week for recheck of your labs with your primary care provider.    Return at anytime for worsening, changes or concerns.    I hope that you feel much better quickly!

## 2025-02-03 ENCOUNTER — PATIENT OUTREACH (OUTPATIENT)
Dept: FAMILY MEDICINE | Facility: CLINIC | Age: 65
End: 2025-02-03
Payer: COMMERCIAL

## 2025-02-03 NOTE — TELEPHONE ENCOUNTER
Transitions of Care Outreach  Chief Complaint   Patient presents with    Hospital F/U       Most Recent Admission Date: 1/31/2025   Most Recent Admission Diagnosis:      Most Recent Discharge Date: 1/31/2025   Most Recent Discharge Diagnosis: Weakness - R53.1  Fatigue, unspecified type - R53.83  Hypomagnesemia - E83.42  Mild dehydration - E86.0  Feeling unwell - R68.89  Type 2 diabetes mellitus with hyperglycemia, without long-term current use of insulin (H) - E11.65  Dry mouth - R68.2     Transitions of Care Assessment    Discharge Assessment  How are you doing now that you are home?: much better but will take awhile to get back to normal  Do you know how to contact your clinic care team if you have future questions or changes to your health status? : Yes  Does the patient have their discharge instructions? : Yes  Does the patient have questions regarding their discharge instructions? : No  Were you started on any new medications or were there changes to any of your previous medications? : No  Does the patient have all of their medications?: Yes  Do you have questions regarding any of your medications? : No  Do you have all of your needed medical supplies or equipment (DME)?  (i.e. oxygen tank, CPAP, cane, etc.): Yes    Follow up Plan     Discharge Follow-Up  Discharge follow up appointment scheduled in alignment with recommended follow up timeframe or Transitions of Risk Category? (Low = within 30 days; Moderate= within 14 days; High= within 7 days): Yes  Discharge Follow Up Appointment Date: 02/06/25    Future Appointments   Date Time Provider Department Center   2/6/2025 11:30 AM Kehr, Kristen M, PA-C [facility]   3/18/2025  7:30 AM [facility]   9/18/2025  9:30 AM Kehr, Kristen M, PA-C [facility]       Outpatient Plan as outlined on AVS reviewed with patient.    For any urgent concerns, please contact our 24 hour nurse triage line: [phone] ([phone])       Mary MORROW  Cyn, RN

## 2025-02-05 ENCOUNTER — ORDERS ONLY (AUTO-RELEASED) (OUTPATIENT)
Dept: ADMISSION | Facility: CLINIC | Age: 65
End: 2025-02-05
Payer: COMMERCIAL

## 2025-02-05 DIAGNOSIS — Z12.11 COLON CANCER SCREENING: ICD-10-CM

## 2025-02-18 ENCOUNTER — TRANSFERRED RECORDS (OUTPATIENT)
Dept: HEALTH INFORMATION MANAGEMENT | Facility: CLINIC | Age: 65
End: 2025-02-18

## 2025-02-18 LAB — RETINOPATHY: POSITIVE

## 2025-02-18 PROCEDURE — 82274 ASSAY TEST FOR BLOOD FECAL: CPT | Performed by: PHYSICIAN ASSISTANT

## 2025-02-19 ENCOUNTER — ALLIED HEALTH/NURSE VISIT (OUTPATIENT)
Dept: AUDIOLOGY | Facility: OTHER | Age: 65
End: 2025-02-19
Payer: COMMERCIAL

## 2025-02-19 DIAGNOSIS — H90.6 MIXED HEARING LOSS, BILATERAL: Primary | ICD-10-CM

## 2025-02-19 PROCEDURE — V5299 HEARING SERVICE: HCPCS | Performed by: AUDIOLOGIST

## 2025-02-19 NOTE — PROGRESS NOTES
HEARING AID DROP-OFF    Patient Name:  Amarilis Vazquez    Patient Age:   65 year old    :  1960    Background:   The patient dropped off both hearing aids, reporting that they were not working.     SIDE: Both   MAKE: Phonak   MODEL: Audeo L30-R   S/N: [id number], [id number]    WARRANTY: 10/21/2026    Procedures:   A listening check revealed slightly weak sound from both hearing aids. Both hearing aids were cleaned and checked, and the domes and left wax guard were replaced. A wax guard was noted to be missing from the right  and a replacement did not fit, likely due to damage to the end of the . A listening check revealed good sound from the left hearing aid and slightly weak sound from the right hearing aid after cleaning.   A replacement  will be ordered for the right hearing aid. This will have to be the newer SDS 5 style, since SDS 4.0 receivers are no longer available. The patient will be informed of the change.    Plan:   The patient will be informed that her hearing aids are ready to , but that we are still waiting for a replacement  for the right hearing aid. She can  her hearing aids now or wait until the right  arrives. It is recommended that she return for an in-person hearing aid check if she has questions about the new wax guards that will be needed for the right hearing aid.      Ru Hernandez, CCC-A  Licensed Audiologist  2025

## 2025-02-20 ENCOUNTER — PATIENT OUTREACH (OUTPATIENT)
Dept: FAMILY MEDICINE | Facility: CLINIC | Age: 65
End: 2025-02-20
Payer: COMMERCIAL

## 2025-02-20 NOTE — TELEPHONE ENCOUNTER
Patient Quality Outreach    Patient is due for the following:   Breast Cancer Screening - Mammogram    Action(s) Taken:   Due for a mammogram    Type of outreach:    Sent ARPU message.    Questions for provider review:    None           Duane Payne MA

## 2025-02-24 LAB — HEMOCCULT STL QL IA: NEGATIVE

## 2025-03-30 ENCOUNTER — HEALTH MAINTENANCE LETTER (OUTPATIENT)
Age: 65
End: 2025-03-30

## 2025-06-02 ENCOUNTER — OFFICE VISIT (OUTPATIENT)
Dept: FAMILY MEDICINE | Facility: CLINIC | Age: 65
End: 2025-06-02
Payer: COMMERCIAL

## 2025-06-02 VITALS
SYSTOLIC BLOOD PRESSURE: 130 MMHG | DIASTOLIC BLOOD PRESSURE: 82 MMHG | WEIGHT: 170 LBS | HEART RATE: 88 BPM | TEMPERATURE: 98.2 F | BODY MASS INDEX: 28.32 KG/M2 | HEIGHT: 65 IN | RESPIRATION RATE: 16 BRPM | OXYGEN SATURATION: 97 %

## 2025-06-02 DIAGNOSIS — Z12.31 VISIT FOR SCREENING MAMMOGRAM: ICD-10-CM

## 2025-06-02 DIAGNOSIS — R80.9 TYPE 2 DIABETES MELLITUS WITH MICROALBUMINURIA, WITHOUT LONG-TERM CURRENT USE OF INSULIN (H): Primary | ICD-10-CM

## 2025-06-02 DIAGNOSIS — U07.1 COVID-19 VIRUS INFECTION: ICD-10-CM

## 2025-06-02 DIAGNOSIS — E11.29 TYPE 2 DIABETES MELLITUS WITH MICROALBUMINURIA, WITHOUT LONG-TERM CURRENT USE OF INSULIN (H): Primary | ICD-10-CM

## 2025-06-02 PROCEDURE — 3079F DIAST BP 80-89 MM HG: CPT | Performed by: PHYSICIAN ASSISTANT

## 2025-06-02 PROCEDURE — 99214 OFFICE O/P EST MOD 30 MIN: CPT | Performed by: PHYSICIAN ASSISTANT

## 2025-06-02 PROCEDURE — 3075F SYST BP GE 130 - 139MM HG: CPT | Performed by: PHYSICIAN ASSISTANT

## 2025-06-02 PROCEDURE — 1126F AMNT PAIN NOTED NONE PRSNT: CPT | Performed by: PHYSICIAN ASSISTANT

## 2025-06-02 ASSESSMENT — PAIN SCALES - GENERAL: PAINLEVEL_OUTOF10: NO PAIN (0)

## 2025-06-02 NOTE — PROGRESS NOTES
"  Assessment & Plan     Type 2 diabetes mellitus with microalbuminuria, without long-term current use of insulin (H)  She is due for lab tests and scheduled in a few weeks.   Illness caused an increase in her blood sugars for a few weeks, but seem to be improving.   She continue with the metformin.   - HEMOGLOBIN A1C; Future  - Basic metabolic panel  (Ca, Cl, CO2, Creat, Gluc, K, Na, BUN); Future    COVID-19 virus infection  Discussed adding albuterol as needed, she declines  Improving with time. Lungs are clear on exam. She is gaining more stamina with activity.     Visit for screening mammogram  - MA Screening Bilateral w/ Ancelmo; Future      She has her regular office visit again in the fall. No adjustments made with her medications.         BMI  Estimated body mass index is 28.07 kg/m  as calculated from the following:    Height as of this encounter: 1.657 m (5' 5.25\").    Weight as of this encounter: 77.1 kg (170 lb).   Weight management plan: Discussed healthy diet and exercise guidelines          Subjective   Jyoti is a 65 year old, presenting for the following health issues:  Hypertension and Cough        6/2/2025    10:46 AM   Additional Questions   Roomed by Duane ROBERTS MA     HPI        Diabetes Follow-up    How often are you checking your blood sugar? One time daily  What time of day are you checking your blood sugars (select all that apply)?  morning  Have you had any blood sugars above 200?  Yes twice this year  Have you had any blood sugars below 70?  No  What symptoms do you notice when your blood sugar is low?  none  What concerns do you have today about your diabetes? None   Do you have any of these symptoms? (Select all that apply)  No numbness or tingling in feet.  No redness, sores or blisters on feet.  No complaints of excessive thirst.  No reports of blurry vision.  No significant changes to weight.      BP Readings from Last 2 Encounters:   06/02/25 130/82   01/31/25 120/73     Hemoglobin A1C (%) " "  Date Value   09/11/2024 6.8 (H)   11/02/2023 6.9 (H)   05/27/2021 7.4 (H)   11/19/2020 7.3 (H)     LDL Cholesterol Calculated   Date Value   09/11/2024      Comment:     Cannot estimate LDL when triglyceride exceeds 400 mg/dL   11/02/2023      Comment:     Cannot estimate LDL when triglyceride exceeds 400 mg/dL   11/19/2020     Cannot estimate LDL when triglyceride exceeds 400 mg/dL mg/dL   10/28/2019 101 mg/dL (H)     LDL Cholesterol Direct (mg/dL)   Date Value   09/11/2024 161 (H)   11/02/2023 205 (H)   11/19/2020 159 (H)             Hypertension Follow-up    Do you check your blood pressure regularly outside of the clinic? Yes   Are you following a low salt diet? Yes  Are your blood pressures ever more than 140 on the top number (systolic) OR more   than 90 on the bottom number (diastolic), for example 140/90? Yes    BP Readings from Last 2 Encounters:   06/02/25 130/82   01/31/25 120/73     Jyoti recently had COVID. She had a lingering cough for weeks. She is overall feeling better, but continues to notice a slight wheeze when she is laying in bed. This also seems to be improving with time. No shortness of breath. No sputum production. No fever. Energy level is improving and she is getting back in a routine with activity / exercise also.       Review of Systems  Constitutional, HEENT, cardiovascular, pulmonary, GI, , musculoskeletal, neuro, skin, endocrine and psych systems are negative, except as otherwise noted.      Objective    /82   Pulse 88   Temp 98.2  F (36.8  C) (Tympanic)   Resp 16   Ht 1.657 m (5' 5.25\")   Wt 77.1 kg (170 lb)   LMP 02/13/2013 (Approximate)   SpO2 97%   Breastfeeding No   BMI 28.07 kg/m    Body mass index is 28.07 kg/m .  Physical Exam   GENERAL: alert and no distress  EYES: Eyes grossly normal to inspection, PERRL and conjunctivae and sclerae normal  HENT: ear canals and TM's normal, nose and mouth without ulcers or lesions  NECK: no adenopathy, no asymmetry, masses, " or scars  RESP: lungs clear to auscultation - no rales, rhonchi or wheezes  CV: regular rate and rhythm, normal S1 S2, no S3 or S4, no murmur, click or rub, no peripheral edema  MS: no gross musculoskeletal defects noted, no edema  SKIN: no suspicious lesions or rashes  NEURO: Normal strength and tone, mentation intact and speech normal  PSYCH: mentation appears normal, affect normal/bright            Signed Electronically by: Kristen M. Kehr, PA-C

## 2025-06-03 ENCOUNTER — PATIENT OUTREACH (OUTPATIENT)
Dept: CARE COORDINATION | Facility: CLINIC | Age: 65
End: 2025-06-03
Payer: COMMERCIAL

## 2025-06-08 ENCOUNTER — MYC MEDICAL ADVICE (OUTPATIENT)
Dept: FAMILY MEDICINE | Facility: CLINIC | Age: 65
End: 2025-06-08
Payer: COMMERCIAL

## 2025-06-08 DIAGNOSIS — R06.02 SHORTNESS OF BREATH: Primary | ICD-10-CM

## 2025-06-09 RX ORDER — ALBUTEROL SULFATE 90 UG/1
2 INHALANT RESPIRATORY (INHALATION) EVERY 6 HOURS PRN
Qty: 18 G | Refills: 2 | Status: SHIPPED | OUTPATIENT
Start: 2025-06-09

## 2025-06-18 ENCOUNTER — LAB (OUTPATIENT)
Dept: LAB | Facility: CLINIC | Age: 65
End: 2025-06-18
Payer: COMMERCIAL

## 2025-06-18 ENCOUNTER — RESULTS FOLLOW-UP (OUTPATIENT)
Dept: FAMILY MEDICINE | Facility: CLINIC | Age: 65
End: 2025-06-18

## 2025-06-18 DIAGNOSIS — E11.29 TYPE 2 DIABETES MELLITUS WITH MICROALBUMINURIA, WITHOUT LONG-TERM CURRENT USE OF INSULIN (H): ICD-10-CM

## 2025-06-18 DIAGNOSIS — R06.02 SHORTNESS OF BREATH: ICD-10-CM

## 2025-06-18 DIAGNOSIS — R80.9 TYPE 2 DIABETES MELLITUS WITH MICROALBUMINURIA, WITHOUT LONG-TERM CURRENT USE OF INSULIN (H): ICD-10-CM

## 2025-06-18 LAB
ANION GAP SERPL CALCULATED.3IONS-SCNC: 15 MMOL/L (ref 7–15)
BUN SERPL-MCNC: 17.2 MG/DL (ref 8–23)
CALCIUM SERPL-MCNC: 10 MG/DL (ref 8.8–10.4)
CHLORIDE SERPL-SCNC: 96 MMOL/L (ref 98–107)
CREAT SERPL-MCNC: 0.71 MG/DL (ref 0.51–0.95)
EGFRCR SERPLBLD CKD-EPI 2021: >90 ML/MIN/1.73M2
EST. AVERAGE GLUCOSE BLD GHB EST-MCNC: 177 MG/DL
GLUCOSE SERPL-MCNC: 204 MG/DL (ref 70–99)
HBA1C MFR BLD: 7.8 % (ref 0–5.6)
HCO3 SERPL-SCNC: 25 MMOL/L (ref 22–29)
POTASSIUM SERPL-SCNC: 4.8 MMOL/L (ref 3.4–5.3)
SODIUM SERPL-SCNC: 136 MMOL/L (ref 135–145)

## 2025-06-18 PROCEDURE — 83036 HEMOGLOBIN GLYCOSYLATED A1C: CPT

## 2025-06-18 PROCEDURE — 36415 COLL VENOUS BLD VENIPUNCTURE: CPT

## 2025-06-18 PROCEDURE — 80048 BASIC METABOLIC PNL TOTAL CA: CPT

## 2025-06-23 RX ORDER — ALBUTEROL SULFATE 90 UG/1
2 INHALANT RESPIRATORY (INHALATION) EVERY 6 HOURS PRN
Qty: 18 G | Refills: 2 | Status: SHIPPED | OUTPATIENT
Start: 2025-06-23

## 2025-07-23 ENCOUNTER — PATIENT OUTREACH (OUTPATIENT)
Dept: FAMILY MEDICINE | Facility: CLINIC | Age: 65
End: 2025-07-23
Payer: COMMERCIAL

## 2025-07-23 NOTE — TELEPHONE ENCOUNTER
Patient Quality Outreach    Patient is due for the following:   Breast Cancer Screening - Mammogram    Action(s) Taken:   Scheduled mammogram, physical was scheduled for 12/11/25 with Kristen Kehr PA-C.    Type of outreach:    Sent [employer] A/S message.    Questions for provider review:    None         Duane Rodrigues MA  Chart routed to None.

## 2025-08-13 ENCOUNTER — PATIENT OUTREACH (OUTPATIENT)
Dept: CARE COORDINATION | Facility: CLINIC | Age: 65
End: 2025-08-13
Payer: COMMERCIAL

## 2025-08-15 ENCOUNTER — TELEPHONE (OUTPATIENT)
Dept: FAMILY MEDICINE | Facility: CLINIC | Age: 65
End: 2025-08-15
Payer: COMMERCIAL

## 2025-08-15 DIAGNOSIS — N18.1 CHRONIC KIDNEY DISEASE, STAGE 1: ICD-10-CM

## 2025-08-15 DIAGNOSIS — R80.9 TYPE 2 DIABETES MELLITUS WITH MICROALBUMINURIA, WITHOUT LONG-TERM CURRENT USE OF INSULIN (H): ICD-10-CM

## 2025-08-15 DIAGNOSIS — E11.29 TYPE 2 DIABETES MELLITUS WITH MICROALBUMINURIA, WITHOUT LONG-TERM CURRENT USE OF INSULIN (H): ICD-10-CM

## 2025-08-15 DIAGNOSIS — I25.10 CORONARY ARTERY DISEASE INVOLVING NATIVE CORONARY ARTERY OF NATIVE HEART WITHOUT ANGINA PECTORIS: ICD-10-CM

## 2025-08-15 DIAGNOSIS — E03.9 HYPOTHYROIDISM, UNSPECIFIED TYPE: ICD-10-CM

## 2025-08-28 DIAGNOSIS — R06.02 SHORTNESS OF BREATH: ICD-10-CM

## 2025-08-28 RX ORDER — ALBUTEROL SULFATE 90 UG/1
2 INHALANT RESPIRATORY (INHALATION) EVERY 6 HOURS PRN
Qty: 18 G | Refills: 2 | Status: SHIPPED | OUTPATIENT
Start: 2025-08-28